# Patient Record
Sex: FEMALE | Race: WHITE | NOT HISPANIC OR LATINO | Employment: FULL TIME | URBAN - METROPOLITAN AREA
[De-identification: names, ages, dates, MRNs, and addresses within clinical notes are randomized per-mention and may not be internally consistent; named-entity substitution may affect disease eponyms.]

---

## 2018-05-05 ENCOUNTER — APPOINTMENT (EMERGENCY)
Dept: RADIOLOGY | Facility: HOSPITAL | Age: 58
End: 2018-05-05
Attending: EMERGENCY MEDICINE
Payer: OTHER MISCELLANEOUS

## 2018-05-05 ENCOUNTER — HOSPITAL ENCOUNTER (INPATIENT)
Facility: HOSPITAL | Age: 58
LOS: 2 days | Discharge: HOME HEALTH | End: 2018-05-08
Attending: EMERGENCY MEDICINE | Admitting: HOSPITALIST
Payer: OTHER MISCELLANEOUS

## 2018-05-05 DIAGNOSIS — S72.001A CLOSED FRACTURE OF NECK OF RIGHT FEMUR, INITIAL ENCOUNTER (CMS/HCC): ICD-10-CM
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
## 2018-05-05 DIAGNOSIS — S62.101A CLOSED FRACTURE OF RIGHT WRIST, INITIAL ENCOUNTER: ICD-10-CM

## 2018-05-05 DIAGNOSIS — S52.501A CLOSED FRACTURE OF DISTAL END OF RIGHT RADIUS, UNSPECIFIED FRACTURE MORPHOLOGY, INITIAL ENCOUNTER: ICD-10-CM

## 2018-05-05 LAB
ALBUMIN SERPL-MCNC: 4.5 G/DL (ref 3.4–5)
ALP SERPL-CCNC: 70 IU/L (ref 35–126)
ALT SERPL-CCNC: 17 IU/L (ref 11–54)
ANION GAP SERPL CALC-SCNC: 7 MEQ/L (ref 3–15)
AST SERPL-CCNC: 26 IU/L (ref 15–41)
BASOPHILS # BLD: 0.05 K/UL (ref 0.01–0.1)
BASOPHILS NFR BLD: 0.6 %
BILIRUB SERPL-MCNC: 0.3 MG/DL (ref 0.3–1.2)
BUN SERPL-MCNC: 14 MG/DL (ref 8–20)
CALCIUM SERPL-MCNC: 9.7 MG/DL (ref 8.9–10.3)
CHLORIDE SERPL-SCNC: 103 MMOL/L (ref 98–109)
CO2 SERPL-SCNC: 30 MMOL/L (ref 22–32)
CREAT SERPL-MCNC: 0.6 MG/DL (ref 0.6–1.1)
DIFFERENTIAL METHOD BLD: ABNORMAL
EOSINOPHIL # BLD: 0.14 K/UL (ref 0.04–0.36)
EOSINOPHIL NFR BLD: 1.6 %
ERYTHROCYTE [DISTWIDTH] IN BLOOD BY AUTOMATED COUNT: 13.3 % (ref 11.7–14.4)
GFR SERPL CREATININE-BSD FRML MDRD: >60 ML/MIN/1.73M*2
GLUCOSE SERPL-MCNC: 114 MG/DL (ref 70–99)
HCT VFR BLDCO AUTO: 38.9 % (ref 35–45)
HGB BLD-MCNC: 12.8 G/DL (ref 11.8–15.7)
IMM GRANULOCYTES # BLD AUTO: 0.09 K/UL (ref 0–0.08)
IMM GRANULOCYTES NFR BLD AUTO: 1.1 %
LYMPHOCYTES # BLD: 3.23 K/UL (ref 1.2–3.5)
LYMPHOCYTES NFR BLD: 38 %
MCH RBC QN AUTO: 30.3 PG (ref 28–33.2)
MCHC RBC AUTO-ENTMCNC: 32.9 G/DL (ref 32.2–35.5)
MCV RBC AUTO: 92 FL (ref 83–98)
MONOCYTES # BLD: 0.75 K/UL (ref 0.28–0.8)
MONOCYTES NFR BLD: 8.8 %
NEUTROPHILS # BLD: 4.24 K/UL (ref 1.7–7)
NEUTS SEG NFR BLD: 49.9 %
NRBC BLD-RTO: 0 %
PDW BLD AUTO: 11.5 FL (ref 9.4–12.3)
PLATELET # BLD AUTO: 198 K/UL (ref 150–369)
POTASSIUM SERPL-SCNC: 3.2 MMOL/L (ref 3.6–5.1)
PROT SERPL-MCNC: 7.6 G/DL (ref 6–8.2)
RBC # BLD AUTO: 4.23 M/UL (ref 3.93–5.22)
SODIUM SERPL-SCNC: 140 MMOL/L (ref 136–144)
WBC # BLD AUTO: 8.5 K/UL (ref 3.8–10.5)

## 2018-05-05 PROCEDURE — 85025 COMPLETE CBC W/AUTO DIFF WBC: CPT | Performed by: EMERGENCY MEDICINE

## 2018-05-05 PROCEDURE — 80053 COMPREHEN METABOLIC PANEL: CPT | Performed by: EMERGENCY MEDICINE

## 2018-05-05 PROCEDURE — 99284 EMERGENCY DEPT VISIT MOD MDM: CPT | Mod: 25

## 2018-05-05 PROCEDURE — 73502 X-RAY EXAM HIP UNI 2-3 VIEWS: CPT | Mod: RT

## 2018-05-05 PROCEDURE — 36415 COLL VENOUS BLD VENIPUNCTURE: CPT

## 2018-05-05 PROCEDURE — 73110 X-RAY EXAM OF WRIST: CPT | Mod: RT

## 2018-05-05 PROCEDURE — 96374 THER/PROPH/DIAG INJ IV PUSH: CPT

## 2018-05-05 RX ORDER — SERTRALINE HYDROCHLORIDE 100 MG/1
100 TABLET, FILM COATED ORAL DAILY
COMMUNITY
End: 2018-06-25

## 2018-05-05 RX ORDER — SODIUM CHLORIDE 9 MG/ML
126 INJECTION, SOLUTION INTRAVENOUS CONTINUOUS
Status: DISCONTINUED | OUTPATIENT
Start: 2018-05-05 | End: 2018-05-06

## 2018-05-05 RX ORDER — MORPHINE SULFATE 4 MG/ML
4 INJECTION, SOLUTION INTRAMUSCULAR; INTRAVENOUS EVERY 30 MIN PRN
Status: COMPLETED | OUTPATIENT
Start: 2018-05-05 | End: 2018-05-06

## 2018-05-06 ENCOUNTER — APPOINTMENT (INPATIENT)
Dept: RADIOLOGY | Facility: HOSPITAL | Age: 58
End: 2018-05-06
Attending: SPECIALIST
Payer: OTHER MISCELLANEOUS

## 2018-05-06 ENCOUNTER — ANESTHESIA EVENT (INPATIENT)
Dept: OPERATING ROOM | Facility: HOSPITAL | Age: 58
End: 2018-05-06
Payer: OTHER MISCELLANEOUS

## 2018-05-06 ENCOUNTER — APPOINTMENT (OUTPATIENT)
Dept: RADIOLOGY | Facility: HOSPITAL | Age: 58
Setting detail: OBSERVATION
End: 2018-05-06
Attending: SPECIALIST
Payer: OTHER MISCELLANEOUS

## 2018-05-06 ENCOUNTER — ANESTHESIA (INPATIENT)
Dept: OPERATING ROOM | Facility: HOSPITAL | Age: 58
End: 2018-05-06
Payer: OTHER MISCELLANEOUS

## 2018-05-06 ENCOUNTER — APPOINTMENT (OUTPATIENT)
Dept: RADIOLOGY | Facility: HOSPITAL | Age: 58
Setting detail: OBSERVATION
End: 2018-05-06
Attending: HOSPITALIST
Payer: OTHER MISCELLANEOUS
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
## 2018-05-06 PROBLEM — S72.001A CLOSED FRACTURE OF NECK OF RIGHT FEMUR (CMS/HCC): Status: ACTIVE | Noted: 2018-05-05

## 2018-05-06 PROBLEM — S62.101A CLOSED FRACTURE OF RIGHT WRIST: Status: ACTIVE | Noted: 2018-05-06
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
## 2018-05-06 PROBLEM — F32.A DEPRESSION: Status: ACTIVE | Noted: 2018-05-06

## 2018-05-06 PROBLEM — S72.001D CLOSED FRACTURE OF RIGHT HIP WITH ROUTINE HEALING: Status: ACTIVE | Noted: 2018-05-06

## 2018-05-06 LAB
AMPHET UR QL SCN: ABNORMAL
ANION GAP SERPL CALC-SCNC: 6 MEQ/L (ref 3–15)
BACTERIA URNS QL MICRO: ABNORMAL /UL
BARBITURATES UR QL SCN: ABNORMAL
BASOPHILS # BLD: 0.03 K/UL (ref 0.01–0.1)
BASOPHILS NFR BLD: 0.3 %
BENZODIAZ UR QL SCN: ABNORMAL
BILIRUB UR QL STRIP.AUTO: NEGATIVE MG/DL
BUN SERPL-MCNC: 14 MG/DL (ref 8–20)
CALCIUM SERPL-MCNC: 9.5 MG/DL (ref 8.9–10.3)
CANNABINOIDS UR QL SCN: ABNORMAL
CHLORIDE SERPL-SCNC: 106 MMOL/L (ref 98–109)
CLARITY UR REFRACT.AUTO: ABNORMAL
CO2 SERPL-SCNC: 28 MMOL/L (ref 22–32)
COCAINE UR QL SCN: ABNORMAL
COLOR UR AUTO: YELLOW
CREAT SERPL-MCNC: 0.5 MG/DL (ref 0.6–1.1)
DIFFERENTIAL METHOD BLD: ABNORMAL
EOSINOPHIL # BLD: 0.1 K/UL (ref 0.04–0.36)
EOSINOPHIL NFR BLD: 1 %
ERYTHROCYTE [DISTWIDTH] IN BLOOD BY AUTOMATED COUNT: 13.3 % (ref 11.7–14.4)
GFR SERPL CREATININE-BSD FRML MDRD: >60 ML/MIN/1.73M*2
GLUCOSE SERPL-MCNC: 112 MG/DL (ref 70–99)
GLUCOSE UR STRIP.AUTO-MCNC: NEGATIVE MG/DL
HCT VFR BLDCO AUTO: 39.3 % (ref 35–45)
HGB BLD-MCNC: 12.7 G/DL (ref 11.8–15.7)
HGB UR QL STRIP.AUTO: NEGATIVE
HYALINE CASTS #/AREA URNS LPF: ABNORMAL /LPF
IMM GRANULOCYTES # BLD AUTO: 0.04 K/UL (ref 0–0.08)
IMM GRANULOCYTES NFR BLD AUTO: 0.4 %
KETONES UR STRIP.AUTO-MCNC: NEGATIVE MG/DL
LEUKOCYTE ESTERASE UR QL STRIP.AUTO: 2
LYMPHOCYTES # BLD: 2.02 K/UL (ref 1.2–3.5)
LYMPHOCYTES NFR BLD: 19.8 %
MAGNESIUM SERPL-MCNC: 2 MG/DL (ref 1.8–2.5)
MCH RBC QN AUTO: 30 PG (ref 28–33.2)
MCHC RBC AUTO-ENTMCNC: 32.3 G/DL (ref 32.2–35.5)
MCV RBC AUTO: 92.9 FL (ref 83–98)
MONOCYTES # BLD: 1.03 K/UL (ref 0.28–0.8)
MONOCYTES NFR BLD: 10.1 %
MUCOUS THREADS URNS QL MICRO: ABNORMAL /LPF
NEUTROPHILS # BLD: 6.96 K/UL (ref 1.7–7)
NEUTS SEG NFR BLD: 68.4 %
NITRITE UR QL STRIP.AUTO: NEGATIVE
NRBC BLD-RTO: 0 %
OPIATES UR QL SCN: POSITIVE MA/MIN
PCP UR QL SCN: ABNORMAL
PDW BLD AUTO: 11.2 FL (ref 9.4–12.3)
PH UR STRIP.AUTO: 7 [PH]
PLATELET # BLD AUTO: 178 K/UL (ref 150–369)
POTASSIUM SERPL-SCNC: 3.9 MMOL/L (ref 3.6–5.1)
PROT UR QL STRIP.AUTO: NEGATIVE
RAINBOW HOLD SPECIMEN: NORMAL
RBC # BLD AUTO: 4.23 M/UL (ref 3.93–5.22)
RBC #/AREA URNS HPF: ABNORMAL /HPF
SODIUM SERPL-SCNC: 140 MMOL/L (ref 136–144)
SP GR UR REFRACT.AUTO: 1.01
SQUAMOUS URNS QL MICRO: 1 /HPF
UROBILINOGEN UR STRIP-ACNC: 0.2 EU/DL
WBC # BLD AUTO: 10.18 K/UL (ref 3.8–10.5)
WBC #/AREA URNS HPF: ABNORMAL /HPF

## 2018-05-06 PROCEDURE — 63600000 HC DRUGS/DETAIL CODE: Performed by: SPECIALIST

## 2018-05-06 PROCEDURE — 63700000 HC SELF-ADMINISTRABLE DRUG: Performed by: HOSPITALIST

## 2018-05-06 PROCEDURE — 27200000 HC STERILE SUPPLY: Performed by: SPECIALIST

## 2018-05-06 PROCEDURE — 71000001 HC PACU PHASE 1 INITIAL 30MIN: Performed by: SPECIALIST

## 2018-05-06 PROCEDURE — 63600000 HC DRUGS/DETAIL CODE: Performed by: EMERGENCY MEDICINE

## 2018-05-06 PROCEDURE — 63600000 HC DRUGS/DETAIL CODE: Performed by: NURSE ANESTHETIST, CERTIFIED REGISTERED

## 2018-05-06 PROCEDURE — 71000011 HC PACU PHASE 1 EA ADDL MIN: Performed by: SPECIALIST

## 2018-05-06 PROCEDURE — 85025 COMPLETE CBC W/AUTO DIFF WBC: CPT | Performed by: HOSPITALIST

## 2018-05-06 PROCEDURE — 0QS634Z REPOSITION RIGHT UPPER FEMUR WITH INTERNAL FIXATION DEVICE, PERCUTANEOUS APPROACH: ICD-10-PCS | Performed by: SPECIALIST

## 2018-05-06 PROCEDURE — 2W3AX2Z IMMOBILIZATION OF RIGHT UPPER ARM USING CAST: ICD-10-PCS | Performed by: EMERGENCY MEDICINE

## 2018-05-06 PROCEDURE — 36415 COLL VENOUS BLD VENIPUNCTURE: CPT | Performed by: HOSPITALIST

## 2018-05-06 PROCEDURE — 63600000 HC DRUGS/DETAIL CODE: Performed by: HOSPITALIST

## 2018-05-06 PROCEDURE — G0378 HOSPITAL OBSERVATION PER HR: HCPCS

## 2018-05-06 PROCEDURE — 73502 X-RAY EXAM HIP UNI 2-3 VIEWS: CPT | Mod: RT

## 2018-05-06 PROCEDURE — 25800000 HC PHARMACY IV SOLUTIONS: Performed by: HOSPITALIST

## 2018-05-06 PROCEDURE — 12000000 HC ROOM AND CARE MED/SURG

## 2018-05-06 PROCEDURE — 37000010 HC ANESTHESIA SPINAL: Performed by: SPECIALIST

## 2018-05-06 PROCEDURE — 83735 ASSAY OF MAGNESIUM: CPT | Performed by: HOSPITALIST

## 2018-05-06 PROCEDURE — 96374 THER/PROPH/DIAG INJ IV PUSH: CPT

## 2018-05-06 PROCEDURE — 25800000 HC PHARMACY IV SOLUTIONS: Performed by: SPECIALIST

## 2018-05-06 PROCEDURE — 71045 X-RAY EXAM CHEST 1 VIEW: CPT

## 2018-05-06 PROCEDURE — 80048 BASIC METABOLIC PNL TOTAL CA: CPT | Performed by: HOSPITALIST

## 2018-05-06 PROCEDURE — C1713 ANCHOR/SCREW BN/BN,TIS/BN: HCPCS | Performed by: SPECIALIST

## 2018-05-06 PROCEDURE — 36000014 HC OR LEVEL 4 EA ADDL MIN: Performed by: SPECIALIST

## 2018-05-06 PROCEDURE — BQ13ZZZ FLUOROSCOPY OF RIGHT FEMUR: ICD-10-PCS | Performed by: SPECIALIST

## 2018-05-06 PROCEDURE — 99219 PR INITIAL OBSERVATION CARE/DAY 50 MINUTES: CPT | Performed by: HOSPITALIST

## 2018-05-06 PROCEDURE — 96376 TX/PRO/DX INJ SAME DRUG ADON: CPT

## 2018-05-06 PROCEDURE — 81001 URINALYSIS AUTO W/SCOPE: CPT | Performed by: HOSPITALIST

## 2018-05-06 PROCEDURE — 99233 SBSQ HOSP IP/OBS HIGH 50: CPT | Performed by: HOSPITALIST

## 2018-05-06 PROCEDURE — 20600000 HC ROOM AND CARE INTERMEDIATE/TELEMETRY

## 2018-05-06 PROCEDURE — 36000004 HC OR LEVEL 4 INITIAL 30MIN: Performed by: SPECIALIST

## 2018-05-06 PROCEDURE — 80307 DRUG TEST PRSMV CHEM ANLYZR: CPT | Performed by: HOSPITALIST

## 2018-05-06 PROCEDURE — 37000010 ANESTHESIA SPINAL BLOCK: Performed by: ANESTHESIOLOGY

## 2018-05-06 PROCEDURE — 2W3AX1Z IMMOBILIZATION OF RIGHT UPPER ARM USING SPLINT: ICD-10-PCS | Performed by: EMERGENCY MEDICINE

## 2018-05-06 RX ORDER — ONDANSETRON 4 MG/1
4 TABLET, ORALLY DISINTEGRATING ORAL EVERY 8 HOURS PRN
Status: DISCONTINUED | OUTPATIENT
Start: 2018-05-06 | End: 2018-05-08 | Stop reason: HOSPADM

## 2018-05-06 RX ORDER — SERTRALINE HYDROCHLORIDE 100 MG/1
100 TABLET, FILM COATED ORAL DAILY
Status: DISCONTINUED | OUTPATIENT
Start: 2018-05-06 | End: 2018-05-08 | Stop reason: HOSPADM

## 2018-05-06 RX ORDER — ENOXAPARIN SODIUM 100 MG/ML
40 INJECTION SUBCUTANEOUS
Status: DISCONTINUED | OUTPATIENT
Start: 2018-05-06 | End: 2018-05-08 | Stop reason: HOSPADM

## 2018-05-06 RX ORDER — SODIUM CHLORIDE 9 MG/ML
INJECTION, SOLUTION INTRAVENOUS CONTINUOUS
Status: DISCONTINUED | OUTPATIENT
Start: 2018-05-06 | End: 2018-05-06

## 2018-05-06 RX ORDER — DEXTROSE 50 % IN WATER (D50W) INTRAVENOUS SYRINGE
25 AS NEEDED
Status: DISCONTINUED | OUTPATIENT
Start: 2018-05-06 | End: 2018-05-06 | Stop reason: SDUPTHER

## 2018-05-06 RX ORDER — CEFAZOLIN SODIUM/WATER 1 G/10 ML
SYRINGE (ML) INTRAVENOUS AS NEEDED
Status: DISCONTINUED | OUTPATIENT
Start: 2018-05-06 | End: 2018-05-06 | Stop reason: SURG

## 2018-05-06 RX ORDER — OXYCODONE HYDROCHLORIDE 5 MG/1
5 TABLET ORAL EVERY 4 HOURS PRN
Status: DISCONTINUED | OUTPATIENT
Start: 2018-05-06 | End: 2018-05-08 | Stop reason: HOSPADM

## 2018-05-06 RX ORDER — HYDROMORPHONE HYDROCHLORIDE 1 MG/ML
0.5 INJECTION, SOLUTION INTRAMUSCULAR; INTRAVENOUS; SUBCUTANEOUS
Status: DISCONTINUED | OUTPATIENT
Start: 2018-05-06 | End: 2018-05-06 | Stop reason: HOSPADM

## 2018-05-06 RX ORDER — ACETAMINOPHEN 325 MG/1
650 TABLET ORAL EVERY 4 HOURS PRN
Status: DISCONTINUED | OUTPATIENT
Start: 2018-05-06 | End: 2018-05-08 | Stop reason: HOSPADM

## 2018-05-06 RX ORDER — SODIUM CHLORIDE 9 MG/ML
INJECTION, SOLUTION INTRAVENOUS ONCE
Status: COMPLETED | OUTPATIENT
Start: 2018-05-06 | End: 2018-05-06

## 2018-05-06 RX ORDER — ACETAMINOPHEN 325 MG/1
650 TABLET ORAL EVERY 4 HOURS PRN
Status: DISCONTINUED | OUTPATIENT
Start: 2018-05-06 | End: 2018-05-06 | Stop reason: SDUPTHER

## 2018-05-06 RX ORDER — PROPOFOL 10 MG/ML
INJECTION, EMULSION INTRAVENOUS CONTINUOUS PRN
Status: DISCONTINUED | OUTPATIENT
Start: 2018-05-06 | End: 2018-05-06 | Stop reason: SURG

## 2018-05-06 RX ORDER — ONDANSETRON HYDROCHLORIDE 2 MG/ML
4 INJECTION, SOLUTION INTRAVENOUS EVERY 8 HOURS PRN
Status: DISCONTINUED | OUTPATIENT
Start: 2018-05-06 | End: 2018-05-08 | Stop reason: HOSPADM

## 2018-05-06 RX ORDER — KETOROLAC TROMETHAMINE 30 MG/ML
30 INJECTION, SOLUTION INTRAMUSCULAR; INTRAVENOUS EVERY 6 HOURS PRN
Status: DISCONTINUED | OUTPATIENT
Start: 2018-05-06 | End: 2018-05-08 | Stop reason: HOSPADM

## 2018-05-06 RX ORDER — IBUPROFEN 200 MG
16-32 TABLET ORAL AS NEEDED
Status: DISCONTINUED | OUTPATIENT
Start: 2018-05-06 | End: 2018-05-06 | Stop reason: SDUPTHER

## 2018-05-06 RX ORDER — POLYETHYLENE GLYCOL 3350 17 G/17G
17 POWDER, FOR SOLUTION ORAL DAILY
Status: DISCONTINUED | OUTPATIENT
Start: 2018-05-06 | End: 2018-05-08 | Stop reason: HOSPADM

## 2018-05-06 RX ORDER — CEFAZOLIN SODIUM 1 G/50ML
1 SOLUTION INTRAVENOUS
Status: DISCONTINUED | OUTPATIENT
Start: 2018-05-06 | End: 2018-05-08

## 2018-05-06 RX ORDER — MIDAZOLAM HYDROCHLORIDE 2 MG/2ML
INJECTION, SOLUTION INTRAMUSCULAR; INTRAVENOUS AS NEEDED
Status: DISCONTINUED | OUTPATIENT
Start: 2018-05-06 | End: 2018-05-06 | Stop reason: SURG

## 2018-05-06 RX ORDER — MORPHINE SULFATE 4 MG/ML
4 INJECTION, SOLUTION INTRAMUSCULAR; INTRAVENOUS
Status: DISCONTINUED | OUTPATIENT
Start: 2018-05-06 | End: 2018-05-06

## 2018-05-06 RX ORDER — POLYETHYLENE GLYCOL 3350 17 G/17G
17 POWDER, FOR SOLUTION ORAL DAILY
Status: DISCONTINUED | OUTPATIENT
Start: 2018-05-06 | End: 2018-05-06 | Stop reason: SDUPTHER

## 2018-05-06 RX ORDER — ONDANSETRON HYDROCHLORIDE 2 MG/ML
4 INJECTION, SOLUTION INTRAVENOUS
Status: DISCONTINUED | OUTPATIENT
Start: 2018-05-06 | End: 2018-05-06 | Stop reason: HOSPADM

## 2018-05-06 RX ORDER — DEXTROSE 40 %
15-30 GEL (GRAM) ORAL AS NEEDED
Status: DISCONTINUED | OUTPATIENT
Start: 2018-05-06 | End: 2018-05-06 | Stop reason: SDUPTHER

## 2018-05-06 RX ORDER — CEFAZOLIN SODIUM/WATER 2 G/20 ML
2 SYRINGE (ML) INTRAVENOUS ONCE
Status: CANCELLED | OUTPATIENT
Start: 2018-05-06 | End: 2018-05-06

## 2018-05-06 RX ORDER — IBUPROFEN 200 MG
16-32 TABLET ORAL AS NEEDED
Status: DISCONTINUED | OUTPATIENT
Start: 2018-05-06 | End: 2018-05-08 | Stop reason: HOSPADM

## 2018-05-06 RX ORDER — MORPHINE SULFATE 4 MG/ML
1-2 INJECTION, SOLUTION INTRAMUSCULAR; INTRAVENOUS
Status: DISCONTINUED | OUTPATIENT
Start: 2018-05-06 | End: 2018-05-06 | Stop reason: SDUPTHER

## 2018-05-06 RX ORDER — DEXTROSE 40 %
15-30 GEL (GRAM) ORAL AS NEEDED
Status: DISCONTINUED | OUTPATIENT
Start: 2018-05-06 | End: 2018-05-08 | Stop reason: HOSPADM

## 2018-05-06 RX ORDER — ONDANSETRON 4 MG/1
4 TABLET, ORALLY DISINTEGRATING ORAL EVERY 8 HOURS PRN
Status: DISCONTINUED | OUTPATIENT
Start: 2018-05-06 | End: 2018-05-06 | Stop reason: SDUPTHER

## 2018-05-06 RX ORDER — SODIUM CHLORIDE 9 MG/ML
INJECTION, SOLUTION INTRAVENOUS ONCE
Status: ACTIVE | OUTPATIENT
Start: 2018-05-06 | End: 2018-05-06

## 2018-05-06 RX ORDER — ONDANSETRON HYDROCHLORIDE 2 MG/ML
4 INJECTION, SOLUTION INTRAVENOUS EVERY 8 HOURS PRN
Status: DISCONTINUED | OUTPATIENT
Start: 2018-05-06 | End: 2018-05-06 | Stop reason: SDUPTHER

## 2018-05-06 RX ORDER — DEXTROSE 50 % IN WATER (D50W) INTRAVENOUS SYRINGE
25 AS NEEDED
Status: DISCONTINUED | OUTPATIENT
Start: 2018-05-06 | End: 2018-05-08 | Stop reason: HOSPADM

## 2018-05-06 RX ORDER — FENTANYL CITRATE 50 UG/ML
50 INJECTION, SOLUTION INTRAMUSCULAR; INTRAVENOUS
Status: DISCONTINUED | OUTPATIENT
Start: 2018-05-06 | End: 2018-05-06 | Stop reason: HOSPADM

## 2018-05-06 RX ORDER — MORPHINE SULFATE 4 MG/ML
2 INJECTION, SOLUTION INTRAMUSCULAR; INTRAVENOUS
Status: DISCONTINUED | OUTPATIENT
Start: 2018-05-06 | End: 2018-05-08 | Stop reason: HOSPADM

## 2018-05-06 RX ORDER — SODIUM CHLORIDE 9 MG/ML
INJECTION, SOLUTION INTRAVENOUS CONTINUOUS
Status: DISCONTINUED | OUTPATIENT
Start: 2018-05-06 | End: 2018-05-06 | Stop reason: SDUPTHER

## 2018-05-06 RX ORDER — FENTANYL CITRATE 50 UG/ML
INJECTION, SOLUTION INTRAMUSCULAR; INTRAVENOUS AS NEEDED
Status: DISCONTINUED | OUTPATIENT
Start: 2018-05-06 | End: 2018-05-06 | Stop reason: SURG

## 2018-05-06 RX ADMIN — MORPHINE SULFATE 4 MG: 4 INJECTION INTRAVENOUS at 00:03

## 2018-05-06 RX ADMIN — CEFAZOLIN SODIUM 1 G: 1 SOLUTION INTRAVENOUS at 20:46

## 2018-05-06 RX ADMIN — Medication 2 G: at 12:33

## 2018-05-06 RX ADMIN — MORPHINE SULFATE 4 MG: 4 INJECTION INTRAVENOUS at 07:43

## 2018-05-06 RX ADMIN — SERTRALINE HYDROCHLORIDE 100 MG: 100 TABLET ORAL at 10:53

## 2018-05-06 RX ADMIN — PROPOFOL 60 MCG/KG/MIN: 10 INJECTION, EMULSION INTRAVENOUS at 12:16

## 2018-05-06 RX ADMIN — KETOROLAC TROMETHAMINE 30 MG: 30 INJECTION, SOLUTION INTRAMUSCULAR at 20:46

## 2018-05-06 RX ADMIN — SODIUM CHLORIDE: 9 INJECTION, SOLUTION INTRAVENOUS at 04:23

## 2018-05-06 RX ADMIN — MIDAZOLAM HYDROCHLORIDE 2 MG: 1 INJECTION, SOLUTION INTRAMUSCULAR; INTRAVENOUS at 12:16

## 2018-05-06 RX ADMIN — SODIUM CHLORIDE: 9 INJECTION, SOLUTION INTRAVENOUS at 16:50

## 2018-05-06 RX ADMIN — MORPHINE SULFATE 2 MG: 4 INJECTION INTRAVENOUS at 17:11

## 2018-05-06 RX ADMIN — FENTANYL CITRATE 100 MCG: 50 INJECTION INTRAMUSCULAR; INTRAVENOUS at 12:16

## 2018-05-06 RX ADMIN — MORPHINE SULFATE 4 MG: 4 INJECTION INTRAVENOUS at 01:39

## 2018-05-06 RX ADMIN — MORPHINE SULFATE 2 MG: 4 INJECTION INTRAVENOUS at 19:01

## 2018-05-06 RX ADMIN — ENOXAPARIN SODIUM 40 MG: 100 INJECTION SUBCUTANEOUS at 19:01

## 2018-05-06 ASSESSMENT — PAIN SCALES - GENERAL: PAIN_LEVEL: 0

## 2018-05-06 NOTE — ANESTHESIA POSTPROCEDURE EVALUATION
Patient: Giovanna Noble    Procedure Summary     Date:  05/06/18 Room / Location:   OR 6 / PH OR    Anesthesia Start:  1216 Anesthesia Stop:  1335    Procedure:  OPEN REDUCTION INTERNAL FIXATION HIP/FEMUR PINNING/CANNULATED SCREWS (Right Hip) Diagnosis:       Closed fracture of neck of right femur, initial encounter (CMS/[facility]) ([facility])      (Closed fracture of neck of right femur, initial encounter (CMS/[facility]) ([facility]) [S72.001A])    Surgeon:  Antonio Mckeon Jr., MD Responsible Provider:  Rose Eason MD    Anesthesia Type:  spinal ASA Status:  2          Anesthesia Type: spinal  PACU Vitals  5/6/2018 1330 - 5/6/2018 1353      5/6/2018 1335 5/6/2018 1336          BP: - 130/77      Temp: 36.9 °C (98.5 °F) -      Resp: - 20      SpO2: - 100 %              Anesthesia Post Evaluation    Pain score: 0  Pain management: adequate  Patient participation: complete - patient participated  Level of consciousness: awake and alert  Cardiovascular status: acceptable  Airway Patency: adequate  Respiratory status: acceptable  Hydration status: acceptable  Anesthetic complications: no  Comments: Spinal block resolving well.

## 2018-05-06 NOTE — ANESTHESIA PREPROCEDURE EVALUATION
Anesthesia ROS/MED HX    Anesthesia History - neg  Pulmonary - neg  Neuro/Psych - neg  Cardiovascular- neg  GI/Hepatic- neg  Endo/Other   Body Habitus: Normal  ROS/MED HX Comments:    Musculoskeletal: Right hip and Right wrist fracture. S/p fall.      Past Surgical History:   Procedure Laterality Date   •  SECTION         Physical Exam    Airway   Mallampati: II   TM distance: >3 FB   Neck ROM: full  Cardiovascular - normal   Rhythm: regular   Rate: normal  Pulmonary - normal   clear to auscultation  Dental - normal        Anesthesia Plan    Plan: spinal     Airway: natural airway / supplemental oxygen   ASA 2  Postop Plan:   Patient Disposition: inpatient floor planned admission   Pain Management: IV analgesics

## 2018-05-06 NOTE — ANESTHESIA PROCEDURE NOTES
Spinal Block    Patient location during procedure: OB  Start time: 5/6/2018 12:25 PM  End time: 5/6/2018 12:30 PM  Reason for block: at surgeon's request  Staffing  Anesthesiologist: MELI DENNIS  Performed: anesthesiologist   Preanesthetic Checklist  Completed: patient identified, surgical consent, pre-op evaluation, timeout performed, IV checked, risks and benefits discussed and monitors and equipment checked  Spinal Block  Patient position: right lateral decubitus  Prep: Betadine and site prepped and draped  Patient monitoring: heart rate, cardiac monitor, continuous pulse ox and blood pressure  Approach: midline  Location: L4-5  Injection technique: single-shot  Needle  Needle type: pencil-tip   Needle gauge: 25 G  Needle length: 3.5 in  Assessment  Sensory level: T4  Events: cerebrospinal fluid  Additional Notes  Procedure well tolerated. Vital signs stable.

## 2018-05-07 ENCOUNTER — APPOINTMENT (INPATIENT)
Dept: OCCUPATIONAL THERAPY | Facility: HOSPITAL | Age: 58
End: 2018-05-07
Attending: SPECIALIST
Payer: OTHER MISCELLANEOUS

## 2018-05-07 ENCOUNTER — APPOINTMENT (INPATIENT)
Dept: PHYSICAL THERAPY | Facility: HOSPITAL | Age: 58
End: 2018-05-07
Attending: SPECIALIST
Payer: OTHER MISCELLANEOUS

## 2018-05-07 LAB
ANION GAP SERPL CALC-SCNC: 7 MEQ/L (ref 3–15)
BASOPHILS # BLD: 0.02 K/UL (ref 0.01–0.1)
BASOPHILS NFR BLD: 0.2 %
BUN SERPL-MCNC: 8 MG/DL (ref 8–20)
CALCIUM SERPL-MCNC: 9.2 MG/DL (ref 8.9–10.3)
CHLORIDE SERPL-SCNC: 108 MMOL/L (ref 98–109)
CO2 SERPL-SCNC: 24 MMOL/L (ref 22–32)
CREAT SERPL-MCNC: 0.4 MG/DL (ref 0.6–1.1)
DIFFERENTIAL METHOD BLD: NORMAL
EOSINOPHIL # BLD: 0.15 K/UL (ref 0.04–0.36)
EOSINOPHIL NFR BLD: 1.8 %
ERYTHROCYTE [DISTWIDTH] IN BLOOD BY AUTOMATED COUNT: 13.2 % (ref 11.7–14.4)
GFR SERPL CREATININE-BSD FRML MDRD: >60 ML/MIN/1.73M*2
GLUCOSE SERPL-MCNC: 122 MG/DL (ref 70–99)
HCT VFR BLDCO AUTO: 37.8 % (ref 35–45)
HGB BLD-MCNC: 12.3 G/DL (ref 11.8–15.7)
IMM GRANULOCYTES # BLD AUTO: 0.03 K/UL (ref 0–0.08)
IMM GRANULOCYTES NFR BLD AUTO: 0.4 %
LYMPHOCYTES # BLD: 1.35 K/UL (ref 1.2–3.5)
LYMPHOCYTES NFR BLD: 16.3 %
MCH RBC QN AUTO: 29.9 PG (ref 28–33.2)
MCHC RBC AUTO-ENTMCNC: 32.5 G/DL (ref 32.2–35.5)
MCV RBC AUTO: 92 FL (ref 83–98)
MONOCYTES # BLD: 0.72 K/UL (ref 0.28–0.8)
MONOCYTES NFR BLD: 8.7 %
NEUTROPHILS # BLD: 6.01 K/UL (ref 1.7–7)
NEUTS SEG NFR BLD: 72.6 %
NRBC BLD-RTO: 0 %
PDW BLD AUTO: 11 FL (ref 9.4–12.3)
PLATELET # BLD AUTO: 177 K/UL (ref 150–369)
POTASSIUM SERPL-SCNC: 3.8 MMOL/L (ref 3.6–5.1)
RBC # BLD AUTO: 4.11 M/UL (ref 3.93–5.22)
SODIUM SERPL-SCNC: 139 MMOL/L (ref 136–144)
WBC # BLD AUTO: 8.28 K/UL (ref 3.8–10.5)

## 2018-05-07 PROCEDURE — 97166 OT EVAL MOD COMPLEX 45 MIN: CPT | Mod: GO

## 2018-05-07 PROCEDURE — 36415 COLL VENOUS BLD VENIPUNCTURE: CPT | Performed by: HOSPITALIST

## 2018-05-07 PROCEDURE — 12000000 HC ROOM AND CARE MED/SURG

## 2018-05-07 PROCEDURE — 97162 PT EVAL MOD COMPLEX 30 MIN: CPT | Mod: GP

## 2018-05-07 PROCEDURE — 63700000 HC SELF-ADMINISTRABLE DRUG: Performed by: HOSPITALIST

## 2018-05-07 PROCEDURE — 63700000 HC SELF-ADMINISTRABLE DRUG: Performed by: SPECIALIST

## 2018-05-07 PROCEDURE — 80048 BASIC METABOLIC PNL TOTAL CA: CPT | Performed by: HOSPITALIST

## 2018-05-07 PROCEDURE — 63600000 HC DRUGS/DETAIL CODE: Performed by: HOSPITALIST

## 2018-05-07 PROCEDURE — 99232 SBSQ HOSP IP/OBS MODERATE 35: CPT | Performed by: FAMILY MEDICINE

## 2018-05-07 PROCEDURE — 63600000 HC DRUGS/DETAIL CODE: Performed by: SPECIALIST

## 2018-05-07 PROCEDURE — 85025 COMPLETE CBC W/AUTO DIFF WBC: CPT | Performed by: HOSPITALIST

## 2018-05-07 RX ADMIN — KETOROLAC TROMETHAMINE 30 MG: 30 INJECTION, SOLUTION INTRAMUSCULAR at 08:44

## 2018-05-07 RX ADMIN — SERTRALINE HYDROCHLORIDE 100 MG: 100 TABLET ORAL at 08:43

## 2018-05-07 RX ADMIN — CEFAZOLIN SODIUM 1 G: 1 SOLUTION INTRAVENOUS at 20:59

## 2018-05-07 RX ADMIN — POLYETHYLENE GLYCOL 3350 17 G: 17 POWDER, FOR SOLUTION ORAL at 08:43

## 2018-05-07 RX ADMIN — CEFAZOLIN SODIUM 1 G: 1 SOLUTION INTRAVENOUS at 03:28

## 2018-05-07 RX ADMIN — ACETAMINOPHEN 650 MG: 325 TABLET, FILM COATED ORAL at 08:44

## 2018-05-07 RX ADMIN — CEFAZOLIN SODIUM 1 G: 1 SOLUTION INTRAVENOUS at 12:38

## 2018-05-07 RX ADMIN — ENOXAPARIN SODIUM 40 MG: 100 INJECTION SUBCUTANEOUS at 19:01

## 2018-05-07 RX ADMIN — OXYCODONE HYDROCHLORIDE 5 MG: 5 TABLET ORAL at 08:43

## 2018-05-08 VITALS
TEMPERATURE: 97.9 F | WEIGHT: 127 LBS | HEART RATE: 69 BPM | BODY MASS INDEX: 19.93 KG/M2 | HEIGHT: 67 IN | DIASTOLIC BLOOD PRESSURE: 50 MMHG | SYSTOLIC BLOOD PRESSURE: 108 MMHG | RESPIRATION RATE: 16 BRPM | OXYGEN SATURATION: 98 %

## 2018-05-08 PROCEDURE — 63600000 HC DRUGS/DETAIL CODE: Performed by: SPECIALIST

## 2018-05-08 PROCEDURE — 63700000 HC SELF-ADMINISTRABLE DRUG: Performed by: HOSPITALIST

## 2018-05-08 PROCEDURE — 99239 HOSP IP/OBS DSCHRG MGMT >30: CPT | Performed by: FAMILY MEDICINE

## 2018-05-08 PROCEDURE — 63700000 HC SELF-ADMINISTRABLE DRUG: Performed by: SPECIALIST

## 2018-05-08 RX ORDER — OXYCODONE HYDROCHLORIDE 5 MG/1
5 TABLET ORAL 2 TIMES DAILY PRN
Qty: 6 TABLET | Refills: 0 | Status: SHIPPED | OUTPATIENT
Start: 2018-05-08 | End: 2018-06-25

## 2018-05-08 RX ORDER — ASPIRIN 81 MG/1
81 TABLET ORAL 2 TIMES DAILY
Qty: 60 TABLET | Refills: 0 | COMMUNITY
Start: 2018-05-08 | End: 2019-07-01

## 2018-05-08 RX ADMIN — POLYETHYLENE GLYCOL 3350 17 G: 17 POWDER, FOR SOLUTION ORAL at 09:09

## 2018-05-08 RX ADMIN — OXYCODONE HYDROCHLORIDE 5 MG: 5 TABLET ORAL at 17:56

## 2018-05-08 RX ADMIN — SERTRALINE HYDROCHLORIDE 100 MG: 100 TABLET ORAL at 09:09

## 2018-05-08 RX ADMIN — KETOROLAC TROMETHAMINE 30 MG: 30 INJECTION, SOLUTION INTRAMUSCULAR at 02:33

## 2018-05-08 RX ADMIN — OXYCODONE HYDROCHLORIDE 5 MG: 5 TABLET ORAL at 02:34

## 2018-05-08 RX ADMIN — KETOROLAC TROMETHAMINE 30 MG: 30 INJECTION, SOLUTION INTRAMUSCULAR at 09:09

## 2018-05-08 RX ADMIN — CEFAZOLIN SODIUM 1 G: 1 SOLUTION INTRAVENOUS at 04:11

## 2018-05-08 RX ADMIN — OXYCODONE HYDROCHLORIDE 5 MG: 5 TABLET ORAL at 09:09

## 2018-05-09 ENCOUNTER — PATIENT OUTREACH (OUTPATIENT)
Dept: CASE MANAGEMENT | Facility: CLINIC | Age: 58
End: 2018-05-09

## 2018-05-09 NOTE — PROGRESS NOTES
NAME: Giovanna Noble    MRN: [id number]    YOB: 1960    EVENT DETAILS    Discharging Facility: [facility]  Date of Admission: 05/05/18  Date of Discharge: 05/08/18  Discharge Instructions Reviewed?: Yes         Reason for Admission:  Fall, Fracture of Right Femur, Distal Ulnar Fracture Right Wrist S/P ORIF Right Hip      HPI: Spoke with patient. She stated that she is doing well post operatively. Pain medication seems to be effective in pain management. Prescriptions were picked up today.    Patient denies issues with her incision. Patient has been ambulating with her platform walker.       Patient stated that her right wrist is in a cast. She is not reporting any issues with the cast at this time.     Home care services are expected to contact patient.      MEDICATION REVIEW:    Reported by:: Patient  Prescriptions Filled?: Yes     Was a medication discrepancy indentified?: No     Medication understanding?: Yes     Medication Adherence?: Yes     Any side effects from medication?: No       Medication review Reviewed, see medication history    Additional Comments: N/A      FOLLOW-UP TESTS/PROCEDURES: N/A    INTERVENTIONS / COORDINATION:     PCP Appt: 5/18/2018    Specialist Appt.: Orthopedic Surgeon : Will f/up in 2 weeks, pending return call from office    Home Care: [facility]   Services: RN - PT- OT    BARRIERS TO CARE    Self-Care   Living Arrangement: Spouse or Significant Other       Socioeconomic  Financial Problems?: No     Transportation Issues?: No            PLAN OF CARE:    Reviewed signs/symptoms of worsening condition or complication that necessitate a call to the Physician's office.  Educated patient on access to care.  RN phone number given for future care management needs.       Berkley Delgado RN

## 2018-06-22 RX ORDER — SERTRALINE HYDROCHLORIDE 25 MG/1
25 TABLET, FILM COATED ORAL
Refills: 0 | COMMUNITY
Start: 2018-04-11 | End: 2018-06-25

## 2018-06-25 ENCOUNTER — OFFICE VISIT (OUTPATIENT)
Dept: INTERNAL MEDICINE | Facility: CLINIC | Age: 58
End: 2018-06-25
Payer: COMMERCIAL

## 2018-06-25 VITALS
OXYGEN SATURATION: 97 % | HEIGHT: 67 IN | HEART RATE: 63 BPM | SYSTOLIC BLOOD PRESSURE: 100 MMHG | DIASTOLIC BLOOD PRESSURE: 70 MMHG | WEIGHT: 134.4 LBS | TEMPERATURE: 97.9 F | BODY MASS INDEX: 21.09 KG/M2

## 2018-06-25 DIAGNOSIS — Z13.820 SCREENING FOR OSTEOPOROSIS: ICD-10-CM

## 2018-06-25 DIAGNOSIS — Z12.4 SCREENING FOR CERVICAL CANCER: ICD-10-CM

## 2018-06-25 DIAGNOSIS — M54.31 SCIATICA OF RIGHT SIDE: ICD-10-CM

## 2018-06-25 DIAGNOSIS — Z01.419 WELL FEMALE EXAM WITH ROUTINE GYNECOLOGICAL EXAM: Primary | ICD-10-CM

## 2018-06-25 DIAGNOSIS — Z12.39 SCREENING FOR BREAST CANCER: ICD-10-CM

## 2018-06-25 PROCEDURE — 99396 PREV VISIT EST AGE 40-64: CPT | Performed by: FAMILY MEDICINE

## 2018-06-25 RX ORDER — DICLOFENAC SODIUM 75 MG/1
75 TABLET, DELAYED RELEASE ORAL 2 TIMES DAILY
COMMUNITY
End: 2018-06-25

## 2018-06-25 NOTE — PROGRESS NOTES
Subjective      Patient ID: Giovanna Noble is a 57 y.o. female.    HPI  Since last visit patient has fallen and sustained a fracture to her hip and wrist.    Had surgery.   Hx of HPV on pap last year, will need repeat today.   Concerns over sciatica from sedentary behavior due to recent fall and fracture.  Also mood is starting to be affected by current situation.  The following have been reviewed and updated as appropriate in this visit:  Tobacco  Allergies  Meds  Problems  Med Hx  Surg Hx  Fam Hx  Soc Hx         Current Outpatient Prescriptions:   •  DULoxetine (CYMBALTA) 30 mg capsule, Take 1 capsule (30 mg total) by mouth daily., Disp: 30 capsule, Rfl: 1  Past Medical History:   Diagnosis Date   • Arthritis    • Depression    • History of varicella    • Keratoconus, stable     Carlos Weeks     Past Surgical History:   Procedure Laterality Date   •  SECTION     • FRACTURE SURGERY      right hip   • FRACTURE SURGERY      right wrist     Family History   Problem Relation Age of Onset   • Hypertension Mother    • Glaucoma Mother    • Stroke Father    • Multiple sclerosis Father    • Thyroid cancer Sister      Social History   Substance Use Topics   • Smoking status: Never Smoker   • Smokeless tobacco: Never Used   • Alcohol use Yes     No Known Allergies    Review of Systems   Constitutional: Negative for activity change, appetite change, chills, diaphoresis, fatigue, fever and unexpected weight change.   HENT: Negative for hearing loss, sore throat, trouble swallowing and voice change.    Eyes: Negative for itching and visual disturbance.   Respiratory: Negative for cough, chest tightness, shortness of breath and wheezing.    Cardiovascular: Negative for chest pain, palpitations and leg swelling.   Gastrointestinal: Negative for abdominal pain, blood in stool, constipation, diarrhea, nausea and vomiting.   Endocrine: Negative for cold intolerance, polydipsia and polyuria.   Genitourinary:  "Negative for difficulty urinating, dyspareunia, dysuria, pelvic pain, vaginal bleeding and vaginal discharge.   Musculoskeletal: Positive for arthralgias, back pain and joint swelling. Negative for myalgias.   Skin: Negative for rash.   Neurological: Negative for dizziness, tremors, syncope, weakness, numbness and headaches.   Hematological: Negative for adenopathy. Does not bruise/bleed easily.   Psychiatric/Behavioral: Positive for dysphoric mood. Negative for sleep disturbance. The patient is not nervous/anxious.          Objective   Vitals:    06/25/18 0939   BP: 100/70   BP Location: Right upper arm   Patient Position: Sitting   Pulse: 63   Temp: 36.6 °C (97.9 °F)   TempSrc: Oral   SpO2: 97%   Weight: 61 kg (134 lb 6.4 oz)   Height: 1.689 m (5' 6.5\")       Physical Exam   Constitutional: She appears well-developed and well-nourished.   HENT:   Right Ear: Tympanic membrane, external ear and ear canal normal.   Left Ear: Tympanic membrane, external ear and ear canal normal.   Mouth/Throat: Oropharynx is clear and moist.   Eyes: Conjunctivae and EOM are normal. Pupils are equal, round, and reactive to light.   Neck: Normal range of motion. Carotid bruit is not present. No thyroid mass and no thyromegaly present.   Cardiovascular: Normal rate, regular rhythm, normal heart sounds and intact distal pulses.  Exam reveals no gallop and no friction rub.    No murmur heard.  Pulmonary/Chest: No respiratory distress. She has no wheezes. She has no rales. Right breast exhibits no inverted nipple, no mass, no nipple discharge and no skin change. Left breast exhibits no inverted nipple, no mass, no nipple discharge and no skin change.   Abdominal: Soft. Bowel sounds are normal. She exhibits no distension and no mass. There is no tenderness.   Genitourinary: Uterus normal. No breast tenderness. There is no rash or lesion on the right labia. There is no rash or lesion on the left labia. Cervix exhibits no motion tenderness, no " discharge and no friability. Right adnexum displays no mass, no tenderness and no fullness. Left adnexum displays no mass, no tenderness and no fullness. No vaginal discharge found.   Musculoskeletal: She exhibits no edema.   Cast noted on right wrist   Lymphadenopathy:     She has no cervical adenopathy.   Neurological: She displays no tremor and normal reflexes. No cranial nerve deficit. She exhibits normal muscle tone.   Skin: No lesion and no rash noted.   Psychiatric: Her speech is normal and behavior is normal.       Assessment/Plan   Problem List Items Addressed This Visit     Well female exam with routine gynecological exam - Primary     Vaccines: Teatnus is up to date    Annual Flu vaccine recommended seasonally    Shingrix : discussed    Labs:Not needed  Dental Visits: completed    every 6 months  Vision screenings : completed every 1-2 years  Skin cancer screen : annually through our office    Pap: Collected today   Mammo:   ordered today  Colonoscopy: Up to date, Due every10 years  Dexa Scan:Due.         Relevant Orders    Cytology, Thinprep Pap    PAP AND HR HPV W/REFLEX TO GENOTYPING 16,18/45    Sciatica of right side     Discussed treatment of sciatica with Cymbalta as this may also improve mood as well.  Patient will give this a try and follow-up with me in 4-6 weeks.  Improvement should also occur when she is able to better exercise and revealed strength.           Other Visit Diagnoses     Screening for breast cancer        Relevant Orders    BI SCREENING MAMMOGRAM BILATERAL    Screening for osteoporosis        Relevant Orders    DEXA BONE DENSITY    Screening for cervical cancer        Relevant Orders    Cytology, Thinprep Pap    PAP AND HR HPV W/REFLEX TO GENOTYPING 16,18/45

## 2018-06-26 RX ORDER — DULOXETIN HYDROCHLORIDE 30 MG/1
30 CAPSULE, DELAYED RELEASE ORAL DAILY
Qty: 30 CAPSULE | Refills: 1 | Status: SHIPPED | OUTPATIENT
Start: 2018-06-26 | End: 2018-06-27 | Stop reason: SDUPTHER

## 2018-06-26 ASSESSMENT — ENCOUNTER SYMPTOMS
ADENOPATHY: 0
WEAKNESS: 0
CHILLS: 0
WHEEZING: 0
BRUISES/BLEEDS EASILY: 0
CONSTIPATION: 0
VOICE CHANGE: 0
ARTHRALGIAS: 1
SLEEP DISTURBANCE: 0
POLYDIPSIA: 0
APPETITE CHANGE: 0
DIARRHEA: 0
SORE THROAT: 0
NAUSEA: 0
BACK PAIN: 1
NUMBNESS: 0
TROUBLE SWALLOWING: 0
ACTIVITY CHANGE: 0
EYE ITCHING: 0
DIFFICULTY URINATING: 0
FEVER: 0
TREMORS: 0
PALPITATIONS: 0
DYSPHORIC MOOD: 1
VOMITING: 0
UNEXPECTED WEIGHT CHANGE: 0
BLOOD IN STOOL: 0
HEADACHES: 0
NERVOUS/ANXIOUS: 0
DIAPHORESIS: 0
MYALGIAS: 0
JOINT SWELLING: 1
CHEST TIGHTNESS: 0
DYSURIA: 0
DIZZINESS: 0
FATIGUE: 0
SHORTNESS OF BREATH: 0
COUGH: 0
ABDOMINAL PAIN: 0

## 2018-06-27 NOTE — ASSESSMENT & PLAN NOTE
Vaccines: Teatnus is up to date    Annual Flu vaccine recommended seasonally    Shingrix : discussed    Labs:Not needed  Dental Visits: completed    every 6 months  Vision screenings : completed every 1-2 years  Skin cancer screen : annually through our office    Pap: Collected today   Mammo:   ordered today  Colonoscopy: Up to date, Due every10 years  Dexa Scan:Due.

## 2018-06-27 NOTE — ASSESSMENT & PLAN NOTE
Discussed treatment of sciatica with Cymbalta as this may also improve mood as well.  Patient will give this a try and follow-up with me in 4-6 weeks.  Improvement should also occur when she is able to better exercise and revealed strength.

## 2018-07-02 LAB
CLINICAL INFO: ABNORMAL
CYTO CVX: ABNORMAL
CYTOLOGIST CVX/VAG CYTO: ABNORMAL
CYTOLOGIST CVX/VAG CYTO: ABNORMAL
CYTOLOGY CMNT CVX/VAG CYTO-IMP: ABNORMAL
DATE OF PREVIOUS PAP: ABNORMAL
DATE PREVIOUS BX: ABNORMAL
GEN CATEG CVX/VAG CYTO-IMP: ABNORMAL
HPV E6+E7 MRNA CVX QL NAA+PROBE: DETECTED
LMP START DATE: ABNORMAL
PATHOLOGIST CVX/VAG CYTO: ABNORMAL
QUEST COMMENT (PAP): ABNORMAL
SPECIMEN SOURCE CVX/VAG CYTO: ABNORMAL
STAT OF ADQ CVX/VAG CYTO-IMP: ABNORMAL

## 2018-07-05 ENCOUNTER — HOSPITAL ENCOUNTER (OUTPATIENT)
Dept: RADIOLOGY | Age: 58
Discharge: HOME | End: 2018-07-05
Attending: FAMILY MEDICINE
Payer: COMMERCIAL

## 2018-07-05 ENCOUNTER — TELEPHONE (OUTPATIENT)
Dept: INTERNAL MEDICINE | Facility: CLINIC | Age: 58
End: 2018-07-05

## 2018-07-05 DIAGNOSIS — Z12.39 SCREENING FOR BREAST CANCER: ICD-10-CM

## 2018-07-05 DIAGNOSIS — Z13.820 SCREENING FOR OSTEOPOROSIS: ICD-10-CM

## 2018-07-05 PROCEDURE — 77080 DXA BONE DENSITY AXIAL: CPT

## 2018-07-05 PROCEDURE — 77067 SCR MAMMO BI INCL CAD: CPT

## 2018-07-05 NOTE — TELEPHONE ENCOUNTER
LMOM, need to speak to her about pap : ASUS result is widely abnormal but will require colposcopy.  Will refer to Dr. Aimee Smith.    Called again on 7/6/2018 re pap and need for follow up.  Also infomed pt that her mammo was normal and DEXA showed oseopenia.

## 2018-08-23 ENCOUNTER — OFFICE VISIT (OUTPATIENT)
Dept: OBSTETRICS AND GYNECOLOGY | Facility: CLINIC | Age: 58
End: 2018-08-23
Payer: COMMERCIAL

## 2018-08-23 VITALS — SYSTOLIC BLOOD PRESSURE: 110 MMHG | BODY MASS INDEX: 21.72 KG/M2 | DIASTOLIC BLOOD PRESSURE: 65 MMHG | WEIGHT: 136.6 LBS

## 2018-08-23 DIAGNOSIS — B97.7 HIGH RISK HPV INFECTION: ICD-10-CM

## 2018-08-23 DIAGNOSIS — R85.610: Primary | ICD-10-CM

## 2018-08-23 PROCEDURE — 57452 EXAM OF CERVIX W/SCOPE: CPT | Performed by: OBSTETRICS & GYNECOLOGY

## 2018-08-23 PROCEDURE — 99999 PR OFFICE/OUTPT VISIT,PROCEDURE ONLY: CPT | Performed by: OBSTETRICS & GYNECOLOGY

## 2018-08-23 NOTE — PROCEDURES
Colposcopy  Date/Time: 8/23/2018 1:57 PM  Performed by: TOBIAS QUIÑONES  Authorized by: TOBIAS QUIÑONES     Consent:     Consent obtained:  Written    Consent given by:  Patient    Procedural risks discussed:  Bleeding, damage to other organs, failure rate, infection and repeat procedure    Patient questions answered: yes      Patient agrees, verbalizes understanding, and wants to proceed: yes      Instructions and paperwork completed: yes    Indication:     Indication:  ASC-US  Procedure:     Procedure: Colposcopy only      Under satisfactory analgesia the patient was prepped and draped in the dorsal lithotomy position: yes      [place] speculum was placed in the vagina: yes      Under colposcopic examination the transition zone was seen in entirety: no      Intracervical block was performed: no      Tampon inserted: no      Monsel's solution was applied: no      Biopsy(s): no      Specimen to pathology: no    Post-procedure:     Impression: Low grade cervical dysplasia      Patient tolerance of procedure:  Patient tolerated the procedure well with no immediate complications  Comments:      Patient referred by Dr. Arellano for Colposcopy. Patient had Pap performed in her office that resulted in ASCUS/+HR HPV. Patient reports she had an abnormal pap years ago and believes she had a colposcopy. She has gotten follow up paps that were negative until this last one.     I discussed what this pap and +HR HPV means and where it falls on the spectrum of abnormal paps.     Colposcopy was performed. Cervix was stenotic. Attempts to open the cervix were made with a cytobrush, os finder and a uterine sound and were all unsuccessful. Transformation zone was NOT seen, thus making the colposcopy inadequate.     Acetic acid and Lugals solution were placed on cervix and no abnormalities noted.     Discussion was had with patient regarding options going forward. Discussed need to either visualize the TZ or collect ECC. Gave  patient the option of repeating the ECC on another day and administration of vaginal cytotec. Also offered patient option of LEEP procedure. Patient would like to proceed with the LEEP in the hopes that this may also be therapeutic. Will schedule in the next 1-2 months.     Aimee Ballard MD

## 2018-09-04 ENCOUNTER — OFFICE VISIT (OUTPATIENT)
Dept: INTERNAL MEDICINE | Facility: CLINIC | Age: 58
End: 2018-09-04
Payer: COMMERCIAL

## 2018-09-04 VITALS
WEIGHT: 135.8 LBS | TEMPERATURE: 97.9 F | HEART RATE: 67 BPM | OXYGEN SATURATION: 98 % | BODY MASS INDEX: 21.59 KG/M2 | SYSTOLIC BLOOD PRESSURE: 110 MMHG | DIASTOLIC BLOOD PRESSURE: 60 MMHG

## 2018-09-04 DIAGNOSIS — I83.892 VARICOSE VEINS OF LEFT LOWER EXTREMITY WITH EDEMA: ICD-10-CM

## 2018-09-04 DIAGNOSIS — R60.0 UNILATERAL EDEMA OF LOWER EXTREMITY: Primary | ICD-10-CM

## 2018-09-04 PROCEDURE — 99213 OFFICE O/P EST LOW 20 MIN: CPT | Performed by: FAMILY MEDICINE

## 2018-09-04 NOTE — PROGRESS NOTES
Subjective      Patient ID: Giovanna Noble is a 58 y.o. female.    HPI    Left foot swelling, onset 5 days ago, painful from skin strething, but otherwise not painful.   Ice will help a little.    Used a shoe insert Friday to help with the swelling thinking ti was from a leg descrepancy from the fracture on the right hip.  Not red or hot.   No new shoes.    Able to move toes.   Normal sensation.         The following have been reviewed and updated as appropriate in this visit:       Review of Systems    Objective   Vitals:    09/04/18 1409   BP: 110/60   BP Location: Right upper arm   Patient Position: Sitting   Pulse: 67   Temp: 36.6 °C (97.9 °F)   SpO2: 98%   Weight: 61.6 kg (135 lb 12.8 oz)       Physical Exam  2+ edema in the foot extending up to knee on the left.  No redness, no range of motion limitation, no tenderness to palpation.  Assessment/Plan   Problem List Items Addressed This Visit     Unilateral edema of lower extremity - Primary     Edema can be a result of varicose veins and heat as well as favoring that leg and standing for prolonged periods.  However given her dramatic looks it is appropriate to rule out DVT.  Will order ultrasound of the left lower extremity.  If negative for DVT would encourage compression stocking and possibly hydrochlorothiazide.         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

## 2018-09-05 ENCOUNTER — HOSPITAL ENCOUNTER (OUTPATIENT)
Dept: RADIOLOGY | Facility: HOSPITAL | Age: 58
Discharge: HOME | End: 2018-09-05
Attending: FAMILY MEDICINE
Payer: OTHER MISCELLANEOUS

## 2018-09-05 DIAGNOSIS — I83.892 VARICOSE VEINS OF LEFT LOWER EXTREMITY WITH EDEMA: ICD-10-CM

## 2018-09-05 PROBLEM — R60.0 UNILATERAL EDEMA OF LOWER EXTREMITY: Status: ACTIVE | Noted: 2018-09-05
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
## 2018-09-05 PROCEDURE — 93971 EXTREMITY STUDY: CPT | Mod: LT

## 2018-09-05 NOTE — ASSESSMENT & PLAN NOTE
Edema can be a result of varicose veins and heat as well as favoring that leg and standing for prolonged periods.  However given her dramatic looks it is appropriate to rule out DVT.  Will order ultrasound of the left lower extremity.  If negative for DVT would encourage compression stocking and possibly hydrochlorothiazide.

## 2018-09-10 ENCOUNTER — TELEPHONE (OUTPATIENT)
Dept: OBSTETRICS AND GYNECOLOGY | Facility: CLINIC | Age: 58
End: 2018-09-10

## 2018-09-10 PROBLEM — R85.610 PAPANICOLAOU SMEAR OF ANUS WITH ATYPICAL SQUAMOUS CELLS OF UNDETERMINED SIGNIFICANCE (ASC-US): Status: ACTIVE | Noted: 2018-09-10

## 2018-09-10 PROBLEM — B97.7 PAPILLOMAVIRUS, HUMAN: Status: ACTIVE | Noted: 2018-09-10

## 2018-09-10 NOTE — TELEPHONE ENCOUNTER
Called pt returning her call.  I scheduled her surgery for 9.21.18  Pt asked if I can reshedule the surgery until 10.12.18  Told pt I would check the OR schedule and call her tomorrow.    EFRAÍN

## 2018-09-14 ENCOUNTER — TELEPHONE (OUTPATIENT)
Dept: OBSTETRICS AND GYNECOLOGY | Facility: CLINIC | Age: 58
End: 2018-09-14

## 2018-09-14 NOTE — TELEPHONE ENCOUNTER
Called pt returning her call.  Left msg I have a different date for her surgery.  Pt ot call the office.    EFRAÍN

## 2018-10-02 ENCOUNTER — TELEPHONE (OUTPATIENT)
Dept: OBSTETRICS AND GYNECOLOGY | Facility: CLINIC | Age: 58
End: 2018-10-02

## 2018-10-03 ENCOUNTER — TRANSCRIBE ORDERS (OUTPATIENT)
Dept: CARDIOLOGY | Facility: HOSPITAL | Age: 58
End: 2018-10-03

## 2018-10-03 ENCOUNTER — HOSPITAL ENCOUNTER (OUTPATIENT)
Dept: CARDIOLOGY | Facility: HOSPITAL | Age: 58
Discharge: HOME | End: 2018-10-03
Attending: OBSTETRICS & GYNECOLOGY
Payer: COMMERCIAL

## 2018-10-03 ENCOUNTER — CONSULT (OUTPATIENT)
Dept: OBSTETRICS AND GYNECOLOGY | Facility: CLINIC | Age: 58
End: 2018-10-03
Payer: COMMERCIAL

## 2018-10-03 ENCOUNTER — TRANSCRIBE ORDERS (OUTPATIENT)
Dept: LAB | Facility: HOSPITAL | Age: 58
End: 2018-10-03

## 2018-10-03 ENCOUNTER — APPOINTMENT (OUTPATIENT)
Dept: LAB | Facility: HOSPITAL | Age: 58
End: 2018-10-03
Attending: OBSTETRICS & GYNECOLOGY
Payer: COMMERCIAL

## 2018-10-03 VITALS
BODY MASS INDEX: 21.03 KG/M2 | WEIGHT: 134 LBS | DIASTOLIC BLOOD PRESSURE: 60 MMHG | HEIGHT: 67 IN | SYSTOLIC BLOOD PRESSURE: 104 MMHG

## 2018-10-03 DIAGNOSIS — Z01.812 PRE-OPERATIVE LABORATORY EXAMINATION: ICD-10-CM

## 2018-10-03 DIAGNOSIS — Z01.818 PREOP EXAMINATION: Primary | ICD-10-CM

## 2018-10-03 DIAGNOSIS — Z01.812 ENCOUNTER FOR PREPROCEDURAL LABORATORY EXAMINATION: Primary | ICD-10-CM

## 2018-10-03 DIAGNOSIS — Z01.812 ENCOUNTER FOR PREPROCEDURAL LABORATORY EXAMINATION: ICD-10-CM

## 2018-10-03 DIAGNOSIS — Z01.812 PRE-OPERATIVE LABORATORY EXAMINATION: Primary | ICD-10-CM

## 2018-10-03 LAB
ABO + RH BLD: NORMAL
ATRIAL RATE: 55
BLD GP AB SCN SERPL QL: NEGATIVE
D AG BLD QL: POSITIVE
ERYTHROCYTE [DISTWIDTH] IN BLOOD BY AUTOMATED COUNT: 12.3 % (ref 11.7–14.4)
HCT VFR BLDCO AUTO: 39.5 % (ref 35–45)
HGB BLD-MCNC: 12.9 G/DL (ref 11.8–15.7)
LABORATORY COMMENT REPORT: NORMAL
MCH RBC QN AUTO: 30.4 PG (ref 28–33.2)
MCHC RBC AUTO-ENTMCNC: 32.7 G/DL (ref 32.2–35.5)
MCV RBC AUTO: 92.9 FL (ref 83–98)
P AXIS: 76
PDW BLD AUTO: 11.2 FL (ref 9.4–12.3)
PLATELET # BLD AUTO: 224 K/UL (ref 150–369)
PR INTERVAL: 164
QRS DURATION: 88
QT INTERVAL: 424
QTC CALCULATION(BAZETT): 405
R AXIS: 53
RBC # BLD AUTO: 4.25 M/UL (ref 3.93–5.22)
T WAVE AXIS: 69
VENTRICULAR RATE: 55
WBC # BLD AUTO: 6.31 K/UL (ref 3.8–10.5)

## 2018-10-03 PROCEDURE — 86901 BLOOD TYPING SEROLOGIC RH(D): CPT

## 2018-10-03 PROCEDURE — 93010 ELECTROCARDIOGRAM REPORT: CPT | Performed by: INTERNAL MEDICINE

## 2018-10-03 PROCEDURE — 99024 POSTOP FOLLOW-UP VISIT: CPT | Performed by: OBSTETRICS & GYNECOLOGY

## 2018-10-03 PROCEDURE — 36415 COLL VENOUS BLD VENIPUNCTURE: CPT

## 2018-10-03 PROCEDURE — 85027 COMPLETE CBC AUTOMATED: CPT

## 2018-10-03 PROCEDURE — 93005 ELECTROCARDIOGRAM TRACING: CPT

## 2018-10-04 NOTE — PROGRESS NOTES
" Patient ID: Giovanna Noble   : 1960 58 y.o.  MRN: [id number]   Visit Date: 10/4/2018    Subjective   Giovanna Noble is presenting today for Pre-op Visit (LEEP)      HPI    Patient presents for Pre-op visit for LEEP procedure. Patient referred to me by Dr. Arellano secondary to ASCUS/+HRHPV pap. I attempted colpo in the office. There were no lesions of the face of the cervix but I was unable to visualize the transformation zone due to cervical stenosis. Attempts were made in the office to open the cervix, but we were unsuccessful. Discussed options and patient elected for LEEP to assess Endocervical Canal.       Past Medical History:  has a past medical history of Abnormal Pap smear of cervix; Acne; Arthritis; Asthma; Breast cancer screening (2018); Cardiac dysrhythmia; Colitis, ulcerative (CMS/HCC) (); Depression; Endocrine disorder (); History of varicella; Lyme disease; Keratoconus, stable; Osteopenia (2018); Pap smear for cervical cancer screening (2018); and Rosacea.     Past Surgical History:  has a past surgical history that includes  section; Fracture surgery; Fracture surgery; and Hip surgery (2018).    Obstetric History:   OB History    Para Term  AB Living   2 2 2     2   SAB TAB Ectopic Multiple Live Births           2      # Outcome Date GA Lbr Emeterio/2nd Weight Sex Delivery Anes PTL Lv   2 Term 06 38w0d   F CS-Unspec   LILIAN   1 Term  40w0d   M CS-Unspec   LILIAN          Medications: No current outpatient prescriptions on file.      Allergies: has No Known Allergies.       Vital Signs for this encounter: /60 (BP Location: Left upper arm, Patient Position: Sitting)   Ht 1.702 m (5' 7\")   Wt 60.8 kg (134 lb)   LMP  (LMP Unknown)   BMI 20.99 kg/m²     OBGyn Exam  General Appearance: Alert, cooperative, no acute distress  Head: Normocephalic, without obvious abnormality  Respiratory: CTAB  Cardiovascular: RRR  Abdomen: Soft, nontender, " nondistended,no masses, no organomegaly  Genitalia: Deferred  Extremities: no edema      Impression/Plan:    58 y.o. is presenting today for Pre-op Visit (LEEP)      1. Patient is undergoing a LEEP  for an ASCUS/HR HPV Pap and an inadequate colposcopy.   2.Procedure was explained, risks and benefits were discussed, all questions answered. 3.Written consent was obtained.   4. Patient had PATs today, labs were drawn at that time.   5. Hospital stay and recovery expectations reviewed.     Aimee Ballard MD

## 2018-10-05 ENCOUNTER — HOSPITAL ENCOUNTER (OUTPATIENT)
Facility: HOSPITAL | Age: 58
Setting detail: HOSPITAL OUTPATIENT SURGERY
Discharge: HOME | End: 2018-10-05
Attending: OBSTETRICS & GYNECOLOGY | Admitting: OBSTETRICS & GYNECOLOGY
Payer: COMMERCIAL

## 2018-10-05 ENCOUNTER — ANESTHESIA (OUTPATIENT)
Dept: SURGERY | Facility: HOSPITAL | Age: 58
Setting detail: HOSPITAL OUTPATIENT SURGERY
End: 2018-10-05
Payer: COMMERCIAL

## 2018-10-05 VITALS
DIASTOLIC BLOOD PRESSURE: 60 MMHG | OXYGEN SATURATION: 100 % | SYSTOLIC BLOOD PRESSURE: 120 MMHG | RESPIRATION RATE: 17 BRPM | HEART RATE: 64 BPM | TEMPERATURE: 97.6 F

## 2018-10-05 DIAGNOSIS — B97.7: ICD-10-CM

## 2018-10-05 DIAGNOSIS — R85.610: ICD-10-CM

## 2018-10-05 PROCEDURE — 25000000 HC PHARMACY GENERAL: Performed by: ANESTHESIOLOGY

## 2018-10-05 PROCEDURE — 25000000 HC PHARMACY GENERAL: Performed by: OBSTETRICS & GYNECOLOGY

## 2018-10-05 PROCEDURE — 71000002 HC PACU PHASE 2 INITIAL 30MIN: Performed by: OBSTETRICS & GYNECOLOGY

## 2018-10-05 PROCEDURE — 25800000 HC PHARMACY IV SOLUTIONS: Performed by: OBSTETRICS & GYNECOLOGY

## 2018-10-05 PROCEDURE — 63600000 HC DRUGS/DETAIL CODE: Performed by: NURSE ANESTHETIST, CERTIFIED REGISTERED

## 2018-10-05 PROCEDURE — 27200000 HC STERILE SUPPLY: Performed by: OBSTETRICS & GYNECOLOGY

## 2018-10-05 PROCEDURE — 0UBC7ZX EXCISION OF CERVIX, VIA NATURAL OR ARTIFICIAL OPENING, DIAGNOSTIC: ICD-10-PCS | Performed by: OBSTETRICS & GYNECOLOGY

## 2018-10-05 PROCEDURE — 37000002 HC ANESTHESIA MAC: Performed by: OBSTETRICS & GYNECOLOGY

## 2018-10-05 PROCEDURE — 88307 TISSUE EXAM BY PATHOLOGIST: CPT | Performed by: OBSTETRICS & GYNECOLOGY

## 2018-10-05 PROCEDURE — 71000001 HC PACU PHASE 1 INITIAL 30MIN: Performed by: OBSTETRICS & GYNECOLOGY

## 2018-10-05 PROCEDURE — 36000012 HC OR LEVEL 2 EA ADDL MIN: Performed by: OBSTETRICS & GYNECOLOGY

## 2018-10-05 PROCEDURE — 57522 CONIZATION OF CERVIX: CPT | Performed by: OBSTETRICS & GYNECOLOGY

## 2018-10-05 PROCEDURE — 25000000 HC PHARMACY GENERAL: Performed by: NURSE ANESTHETIST, CERTIFIED REGISTERED

## 2018-10-05 PROCEDURE — 71000011 HC PACU PHASE 1 EA ADDL MIN: Performed by: OBSTETRICS & GYNECOLOGY

## 2018-10-05 PROCEDURE — 71000012 HC PACU PHASE 2 EA ADDL MIN: Performed by: OBSTETRICS & GYNECOLOGY

## 2018-10-05 PROCEDURE — 36000002 HC OR LEVEL 2 INITIAL 30MIN: Performed by: OBSTETRICS & GYNECOLOGY

## 2018-10-05 RX ORDER — LIDOCAINE HYDROCHLORIDE 10 MG/ML
INJECTION, SOLUTION EPIDURAL; INFILTRATION; INTRACAUDAL; PERINEURAL AS NEEDED
Status: DISCONTINUED | OUTPATIENT
Start: 2018-10-05 | End: 2018-10-05 | Stop reason: SURG

## 2018-10-05 RX ORDER — DEXTROSE 50 % IN WATER (D50W) INTRAVENOUS SYRINGE
25 AS NEEDED
Status: DISCONTINUED | OUTPATIENT
Start: 2018-10-05 | End: 2018-10-05 | Stop reason: HOSPADM

## 2018-10-05 RX ORDER — PROPOFOL 200MG/20ML
SYRINGE (ML) INTRAVENOUS AS NEEDED
Status: DISCONTINUED | OUTPATIENT
Start: 2018-10-05 | End: 2018-10-05 | Stop reason: SURG

## 2018-10-05 RX ORDER — FENTANYL CITRATE 50 UG/ML
INJECTION, SOLUTION INTRAMUSCULAR; INTRAVENOUS AS NEEDED
Status: DISCONTINUED | OUTPATIENT
Start: 2018-10-05 | End: 2018-10-05 | Stop reason: SURG

## 2018-10-05 RX ORDER — KETOROLAC TROMETHAMINE 30 MG/ML
INJECTION, SOLUTION INTRAMUSCULAR; INTRAVENOUS AS NEEDED
Status: DISCONTINUED | OUTPATIENT
Start: 2018-10-05 | End: 2018-10-05 | Stop reason: SURG

## 2018-10-05 RX ORDER — HYDROMORPHONE HYDROCHLORIDE 2 MG/ML
.25-.5 INJECTION, SOLUTION INTRAMUSCULAR; INTRAVENOUS; SUBCUTANEOUS
Status: DISCONTINUED | OUTPATIENT
Start: 2018-10-05 | End: 2018-10-05 | Stop reason: HOSPADM

## 2018-10-05 RX ORDER — IBUPROFEN 200 MG
16-32 TABLET ORAL AS NEEDED
Status: DISCONTINUED | OUTPATIENT
Start: 2018-10-05 | End: 2018-10-05 | Stop reason: HOSPADM

## 2018-10-05 RX ORDER — MIDAZOLAM HYDROCHLORIDE 2 MG/2ML
INJECTION, SOLUTION INTRAMUSCULAR; INTRAVENOUS AS NEEDED
Status: DISCONTINUED | OUTPATIENT
Start: 2018-10-05 | End: 2018-10-05 | Stop reason: SURG

## 2018-10-05 RX ORDER — ONDANSETRON 4 MG/1
4 TABLET, ORALLY DISINTEGRATING ORAL EVERY 8 HOURS PRN
Status: DISCONTINUED | OUTPATIENT
Start: 2018-10-05 | End: 2018-10-05 | Stop reason: HOSPADM

## 2018-10-05 RX ORDER — SODIUM CHLORIDE 9 MG/ML
INJECTION, SOLUTION INTRAVENOUS CONTINUOUS
Status: DISCONTINUED | OUTPATIENT
Start: 2018-10-05 | End: 2018-10-05 | Stop reason: HOSPADM

## 2018-10-05 RX ORDER — PROPOFOL 10 MG/ML
INJECTION, EMULSION INTRAVENOUS CONTINUOUS PRN
Status: DISCONTINUED | OUTPATIENT
Start: 2018-10-05 | End: 2018-10-05 | Stop reason: SURG

## 2018-10-05 RX ORDER — OXYCODONE HYDROCHLORIDE 5 MG/1
5-10 TABLET ORAL EVERY 4 HOURS PRN
Status: DISCONTINUED | OUTPATIENT
Start: 2018-10-05 | End: 2018-10-05 | Stop reason: HOSPADM

## 2018-10-05 RX ORDER — ACETAMINOPHEN 325 MG/1
650 TABLET ORAL EVERY 4 HOURS PRN
Status: DISCONTINUED | OUTPATIENT
Start: 2018-10-05 | End: 2018-10-05 | Stop reason: HOSPADM

## 2018-10-05 RX ORDER — EPHEDRINE SULFATE/0.9% NACL/PF 50 MG/5 ML
SYRINGE (ML) INTRAVENOUS AS NEEDED
Status: DISCONTINUED | OUTPATIENT
Start: 2018-10-05 | End: 2018-10-05 | Stop reason: SURG

## 2018-10-05 RX ORDER — DEXTROSE 40 %
15-30 GEL (GRAM) ORAL AS NEEDED
Status: DISCONTINUED | OUTPATIENT
Start: 2018-10-05 | End: 2018-10-05 | Stop reason: HOSPADM

## 2018-10-05 RX ORDER — ONDANSETRON HYDROCHLORIDE 2 MG/ML
INJECTION, SOLUTION INTRAVENOUS AS NEEDED
Status: DISCONTINUED | OUTPATIENT
Start: 2018-10-05 | End: 2018-10-05 | Stop reason: SURG

## 2018-10-05 RX ORDER — LIDOCAINE HYDROCHLORIDE AND EPINEPHRINE 10; 10 UG/ML; MG/ML
INJECTION, SOLUTION INFILTRATION; PERINEURAL AS NEEDED
Status: DISCONTINUED | OUTPATIENT
Start: 2018-10-05 | End: 2018-10-05 | Stop reason: HOSPADM

## 2018-10-05 RX ADMIN — ONDANSETRON 4 MG: 2 INJECTION INTRAMUSCULAR; INTRAVENOUS at 15:52

## 2018-10-05 RX ADMIN — MIDAZOLAM HYDROCHLORIDE 2 MG: 1 INJECTION, SOLUTION INTRAMUSCULAR; INTRAVENOUS at 15:48

## 2018-10-05 RX ADMIN — PROPOFOL 100 MCG/KG/MIN: 10 INJECTION, EMULSION INTRAVENOUS at 15:52

## 2018-10-05 RX ADMIN — Medication 5 MG: at 16:08

## 2018-10-05 RX ADMIN — SODIUM CHLORIDE: 9 INJECTION, SOLUTION INTRAVENOUS at 14:02

## 2018-10-05 RX ADMIN — KETOROLAC TROMETHAMINE 30 MG: 30 INJECTION, SOLUTION INTRAMUSCULAR at 16:37

## 2018-10-05 RX ADMIN — Medication 5 MG: at 16:10

## 2018-10-05 RX ADMIN — FENTANYL CITRATE 50 MCG: 50 INJECTION, SOLUTION INTRAMUSCULAR; INTRAVENOUS at 15:52

## 2018-10-05 RX ADMIN — PROPOFOL 60 MG: 10 INJECTION, EMULSION INTRAVENOUS at 15:52

## 2018-10-05 RX ADMIN — LIDOCAINE HYDROCHLORIDE 5 ML: 10 INJECTION, SOLUTION EPIDURAL; INFILTRATION; INTRACAUDAL; PERINEURAL at 15:52

## 2018-10-05 ASSESSMENT — ENCOUNTER SYMPTOMS: DEPRESSION: 1

## 2018-10-05 ASSESSMENT — PAIN - FUNCTIONAL ASSESSMENT: PAIN_FUNCTIONAL_ASSESSMENT: 0-10

## 2018-10-05 NOTE — ANESTHESIA PREPROCEDURE EVALUATION
Anesthesia ROS/MED HX    Anesthesia History    Previous anesthetics  No history of anesthetic complications  Pulmonary - neg  Neuro/Psych    Depression  Cardiovascular   dyslipidemia  Hematological - neg  GI/Hepatic   inflammatory bowel disease (UC)  Musculoskeletal- neg  Renal Disease- neg  Endo/Other   Diabetes (Pre-diabetes)  Body Habitus: Normal  ROS/MED HX Comments:    Anesthesia History: LEEP procedure due to cervical stenosis leading to difficulty in office procedure   Musculoskeletal: Right sciatica        Patient Active Problem List   Diagnosis   • Closed fracture of right wrist   • Closed fracture of neck of right femur (CMS/[facility]) ([facility])   • Depression   • Dry eyes, bilateral   • Lipids abnormal   • Well female exam with routine gynecological exam   • Sciatica of right side   • Keratoconus, stable   • Unilateral edema of lower extremity   • Papillomavirus, human   • Papanicolaou smear of anus with atypical squamous cells of undetermined significance (ASC-US)        Past Medical History:   Diagnosis Date   • Abnormal Pap smear of cervix    • Arthritis    • Breast cancer screening 2018    birads 1   • Cardiac dysrhythmia    • Colitis, ulcerative (CMS/[facility]) ([facility])    • Depression    • Endocrine disorder 2016    Pre Diabetes   • History of varicella    • Hx of Lyme disease     patient denies this on 10/4/18   • Keratoconus, stable     Carlos Weeks   • Osteopenia 2018   • Pap smear for cervical cancer screening 2018    abnormal pap, hpv abnormal   • Rosacea        Past Surgical History:   Procedure Laterality Date   •  SECTION     • FRACTURE SURGERY      right hip   • FRACTURE SURGERY      right wrist   • HIP SURGERY  2018       Current Facility-Administered Medications   Medication Dose Route Frequency   • sodium chloride 0.9 %   intravenous Continuous       Prior to Admission medications    Not on File       CBC Results       10/03/18 05/07/18 05/06/18                    3140  1021 0604         WBC 6.31 8.28 10.18         RBC 4.25 4.11 4.23         HGB 12.9 12.3 12.7         HCT 39.5 37.8 39.3         MCV 92.9 92.0 92.9         MCH 30.4 29.9 30.0         MCHC 32.7 32.5 32.3          177 178                       BMP Results       05/07/18 05/06/18 05/05/18                    1021 0604 2324          140 140         K 3.8 3.9 3.2 (L)         Cl 108 106 103         CO2 24 28 30         Glucose 122 (H) 112 (H) 114 (H)         BUN 8 14 14         Creatinine 0.4 (L) 0.5 (L) 0.6         Calcium 9.2 9.5 9.7         Anion Gap 7 6 7         EGFR &gt;60.0 &gt;60.0 &gt;60.0         Comment for K at 2324 on 05/05/18:    Results obtained on plasma. Plasma Potassium values may be up to 0.4 mEQ/L less than serum values. The differences may be greater for patients with high platelet or white cell counts.          No results found for: HCGPREGUR, PREGSERUM, HCG, HCGQUANT          No orders to display       Physical Exam    Airway   Mallampati: I   TM distance: >3 FB   Neck ROM: full  Cardiovascular - normal   Rhythm: regular   Rate: normal  Pulmonary - normal  Dental - normal        Anesthesia Plan    Plan: MAC    Technique: MAC     Lines and Monitors: PIV     Airway: natural airway / supplemental oxygen   ASA 2  Anesthetic plan and risks discussed with: patient  Induction:    intravenous   Postop Plan:   Patient Disposition: phase II then home   Pain Management: IV analgesics

## 2018-10-05 NOTE — PERIOPERATIVE NURSING NOTE
Vss denies pain cheyanne pad with small amount of bloody drainage . Tolerated po's voided ml removed. Patient verbalized understanding of  Discharge instruction , restriction , follow up and symptoms requiring  medical attention.

## 2018-10-05 NOTE — DISCHARGE INSTRUCTIONS
LEEP/Loop or [facility] Biopsy  Discharge Instructions    What to Expect    It is normal to experience some staining/spotting after this procedure.  Sometimes the discharge may resemble brown/black debris.  This is normal.    You may experience some cramping after the procedure but this usually does not last for more than a few days.  You may take Tylenol or Ibuprofen (Motrin, Advil) as directed for pain.      Activity      Please do not get in a swimming pool, hot tub or tub bath for at least 2 weeks after your procedure.      Please delay sexual intercourse and use of tampons until your first visit following the procedure.  This is at least 2 weeks from the time of procedure.    You may resume activities such as work, light housework, grocery shopping 1-2 days after your procedure. Please delay any strenuous activities such as heavy exercise and housework for an additional 1-2 days.    PLEASE CALL THE OFFICE -[phone] SCHEDULE A FOLLOW-UP EXAM IN 2 WEEKS FROM TIME OF PROCEDURE.    PLEASE CALL THE OFFICE IF YOU EXPERIENCE ANY OF THE FOLLOWING:  - Heavy bleeding.  This is if you are using about 1 pad per hour.  - Fever greater than 100.4  - Foul smelling vaginal discharge   - Worsening pain or any other concerns

## 2018-10-05 NOTE — OP NOTE
Operative Note    Postoperative diagnosis: ASCUS/+HRHPV pap  Procedure: LEEP  Complications: None  Surgeon: Aimee Ballard MD  Assistant: Manjula Cleary MD  Anesthesia: MAC and local 1% lidocaine with epinepherine  Findings: Normal appearing cervix with stenotic os  Estimated blood loss: Less than 100 mL  Specimens: LEEP specimen, ECC    This is a  58 year old female who was noted to have ASCUS/ +HRHPV pap smear. Colpo was performed in the office, but due to extreme cervical stenosis the transformation zone was not appreciated. The patient was counseld on options and elected for a LEEP procedure. Appropriate consents were obtained in the office and reconfirmed upon admission.     On 10/05/18 patient presented to the hospital and denied any significant interval history. The surgeon and patient were mutually identified and the patient was taken to the operating room.  She was given MAC anesthesia. The patient was placed in the dorsal lithotomy position and prepped and draped in the usual sterile fashion. A timeout was performed. Normal appearing external genitalia were appreciated. The patient was then straight cathed for about 10 cc clear urine. A coated speculum was then placed in the vagina and the cervix was easily visualized. A coated single toothed tenaculum was placed on the anterior lip of the cervix. A cervical block was achieved with 10cc of 1% local lidocaine with epinephrine, placed in the cervical stroma in a circumferential fashion. The LEEP procedure was then performed with one pass of the loop with the specimen obtained in one piece. The specimen was then placed in the container and sent to pathology for permanent evaluation. The os was dilated and ECC specimen was collected and sent to pathology for permanent evaluation. Roller ball cautery was used to achieve excellent hemostasis. All instruments were removed from the vagina. Sponge and instrument counts were correct at the completion of the  procedure. The patient tolerated the procedure well, was awoken from anesthesia  and was taken to the PACU in stable condition.     Dr. Ballard was present and scrubbed for the entire procedure.    Manjula Cleary MD

## 2018-10-05 NOTE — ANESTHESIA POSTPROCEDURE EVALUATION
Patient: Giovanna Noble    Procedure Summary     Date:  10/05/18 Room / Location:  [facility]    Anesthesia Start:  1548 Anesthesia Stop:  1638    Procedure:  CERVICAL LEEP/LETZ (LOOP ELECTROSURGICAL EXCISION PROCEDURE) (N/A Cervix) Diagnosis:       Papillomavirus, human      Papanicolaou smear of anus with atypical squamous cells of undetermined significance (ASC-US)      (Papillomavirus, human [B97.7])      (Papanicolaou smear of anus with atypical squamous cells of undetermined significance (ASC-US) [R85.610])    Surgeon:  Aimee Ballard MD Responsible Provider:  Renetta Saldana MD    Anesthesia Type:  MAC ASA Status:  2          Anesthesia Type: MAC  PACU Vitals  10/5/2018 1631 - 10/5/2018 1731      10/5/2018 1642 10/5/2018 1645 10/5/2018 1700 10/5/2018 1715    BP: (!)  105/56 (!)  104/57 (!)  104/56 120/60    Temp: 36.1 °C (97 °F) - - -    Pulse: 62 63 60 72    Resp: 18 (!)  33 14 (!)  40    SpO2: 100 % 100 % 100 % 100 %            Anesthesia Post Evaluation    Pain management: adequate  Mode of pain management: IV medication  Patient participation: complete - patient participated  Level of consciousness: awake and alert  Cardiovascular status: acceptable  Airway Patency: adequate  Respiratory status: acceptable  Hydration status: acceptable  Anesthetic complications: no

## 2018-10-09 NOTE — TELEPHONE ENCOUNTER
Late entry-on 10.2.18 called pt to have her come into the office 10.3.18 for pre-op appt.  Pt agreed will be 10.3.18    MMB

## 2018-10-09 NOTE — H&P (VIEW-ONLY)
" Patient ID: Giovanna Noble   : 1960 58 y.o.  MRN: [id number]   Visit Date: 10/4/2018    Subjective   Giovanna Noble is presenting today for Pre-op Visit (LEEP)      HPI    Patient presents for Pre-op visit for LEEP procedure. Patient referred to me by Dr. Arellano secondary to ASCUS/+HRHPV pap. I attempted colpo in the office. There were no lesions of the face of the cervix but I was unable to visualize the transformation zone due to cervical stenosis. Attempts were made in the office to open the cervix, but we were unsuccessful. Discussed options and patient elected for LEEP to assess Endocervical Canal.       Past Medical History:  has a past medical history of Abnormal Pap smear of cervix; Acne; Arthritis; Asthma; Breast cancer screening (2018); Cardiac dysrhythmia; Colitis, ulcerative (CMS/HCC) (); Depression; Endocrine disorder (); History of varicella; Lyme disease; Keratoconus, stable; Osteopenia (2018); Pap smear for cervical cancer screening (2018); and Rosacea.     Past Surgical History:  has a past surgical history that includes  section; Fracture surgery; Fracture surgery; and Hip surgery (2018).    Obstetric History:   OB History    Para Term  AB Living   2 2 2     2   SAB TAB Ectopic Multiple Live Births           2      # Outcome Date GA Lbr Emeterio/2nd Weight Sex Delivery Anes PTL Lv   2 Term 06 38w0d   F CS-Unspec   LILIAN   1 Term  40w0d   M CS-Unspec   LILIAN          Medications: No current outpatient prescriptions on file.      Allergies: has No Known Allergies.       Vital Signs for this encounter: /60 (BP Location: Left upper arm, Patient Position: Sitting)   Ht 1.702 m (5' 7\")   Wt 60.8 kg (134 lb)   LMP  (LMP Unknown)   BMI 20.99 kg/m²     OBGyn Exam  General Appearance: Alert, cooperative, no acute distress  Head: Normocephalic, without obvious abnormality  Respiratory: CTAB  Cardiovascular: RRR  Abdomen: Soft, nontender, " nondistended,no masses, no organomegaly  Genitalia: Deferred  Extremities: no edema      Impression/Plan:    58 y.o. is presenting today for Pre-op Visit (LEEP)      1. Patient is undergoing a LEEP  for an ASCUS/HR HPV Pap and an inadequate colposcopy.   2.Procedure was explained, risks and benefits were discussed, all questions answered. 3.Written consent was obtained.   4. Patient had PATs today, labs were drawn at that time.   5. Hospital stay and recovery expectations reviewed.     Aimee Ballard MD

## 2018-10-10 ENCOUNTER — TELEPHONE (OUTPATIENT)
Dept: OBSTETRICS AND GYNECOLOGY | Facility: CLINIC | Age: 58
End: 2018-10-10

## 2018-10-10 LAB
CASE RPRT: NORMAL
CLINICAL INFO: NORMAL
PATH REPORT.FINAL DX SPEC: NORMAL
PATH REPORT.FINAL DX SPEC: NORMAL
PATH REPORT.GROSS SPEC: NORMAL

## 2018-10-10 NOTE — TELEPHONE ENCOUNTER
Spoke with patient about pathology results. After reviewing path report with radiologist we concluded that we did get cervical tissue but there was no endocervical component present. Discussed with patient that this again means that we have not fully evaluated her for her abnormal pap yet. She says she is feeling well has not had any bleeding or cramping. Discussed that I would like to follow up with my gynecologic oncologist and then regroup on follow up options. She asked if hysterectomy to get it out was an option for which I responded yes and my be the best option. Will discuss at follow up visit.

## 2018-10-12 ENCOUNTER — HOSPITAL ENCOUNTER (OUTPATIENT)
Dept: RADIOLOGY | Facility: HOSPITAL | Age: 58
Discharge: HOME | End: 2018-10-12
Attending: FAMILY MEDICINE
Payer: COMMERCIAL

## 2018-10-12 DIAGNOSIS — M79.672 LEFT FOOT PAIN: ICD-10-CM

## 2018-10-12 PROCEDURE — 73630 X-RAY EXAM OF FOOT: CPT | Mod: LT

## 2018-10-12 PROCEDURE — 73610 X-RAY EXAM OF ANKLE: CPT | Mod: LT

## 2018-10-17 ENCOUNTER — OFFICE VISIT (OUTPATIENT)
Dept: OBSTETRICS AND GYNECOLOGY | Facility: CLINIC | Age: 58
End: 2018-10-17
Payer: COMMERCIAL

## 2018-10-17 VITALS — BODY MASS INDEX: 21.02 KG/M2 | SYSTOLIC BLOOD PRESSURE: 100 MMHG | DIASTOLIC BLOOD PRESSURE: 60 MMHG | WEIGHT: 134.2 LBS

## 2018-10-17 DIAGNOSIS — Z09 POSTOP CHECK: Primary | ICD-10-CM

## 2018-10-17 PROCEDURE — 99024 POSTOP FOLLOW-UP VISIT: CPT | Performed by: OBSTETRICS & GYNECOLOGY

## 2018-10-17 NOTE — PROGRESS NOTES
Patient ID: Giovanna Noble   : 1960 58 y.o.  MRN: [id number]   Visit Date: 10/17/2018    Subjective   Giovanna Noble is presenting today for Post-op Visit      HPI    Patient is s/p LEEP and ECC on 10/10/18.     Bleeding: Patient is having bleeding after the procedure, but light  Pain: Patient's pain is well controlled. She is not taking pain medications.   Sexual Activity: has not resumed sexual activity.   Physical Activity: has resumed physical activity.   Bowl and Bladder: Patient is not having difficult with her bowel or bladder function.     Past Medical History:  has a past medical history of Abnormal Pap smear of cervix; Arthritis; Breast cancer screening (2018); Cardiac dysrhythmia; Colitis, ulcerative (CMS/HCC) (); Depression; Endocrine disorder (); History of varicella; Lyme disease; Keratoconus, stable; Osteopenia (2018); Pap smear for cervical cancer screening (2018); and Rosacea. She also has no past medical history of Acne or Asthma.     Past Surgical History:  has a past surgical history that includes  section; Fracture surgery; Fracture surgery; and Hip surgery (2018).    Obstetric History:   OB History    Para Term  AB Living   2 2 2     2   SAB TAB Ectopic Multiple Live Births           2      # Outcome Date GA Lbr Emeterio/2nd Weight Sex Delivery Anes PTL Lv   2 Term 06 38w0d   F CS-Unspec   LILIAN   1 Term  40w0d   M CS-Unspec   LILIAN          Medications: No current outpatient prescriptions on file.      Allergies: has No Known Allergies.       Vital Signs for this encounter: /60 (BP Location: Left upper arm, Patient Position: Sitting)   Wt 60.9 kg (134 lb 3.2 oz)   LMP  (LMP Unknown)   BMI 21.02 kg/m²     OBGyn Exam  General Appearance: Alert, cooperative, no acute distress  Head: Normocephalic, without obvious abnormality  Abdomen: Soft, nontender, nondistended,no masses, no organomegaly. Incisions c/d/i. No Si/Sx of  infection  Genitalia: No lesions or masses. No uterine, cervical, or adnexal tenderness. Scant vaginal bleeding. No vaginal discharge, or erythema present. Cervix is still healing, visually 1-2cm dilated.    Extremities: no edema      Impression/Plan:    1. Physical exam significant for an appropriately healing cervix. Not ready to return to sex yet, will return to office for follow up exam in 2 weeks.   2. Pathology. Patient had ASCUS/+H HPV pap, colpo inadequate due to cervical stenosis. LEEP pathology showed portion of denuded fibromuscular stroma and scant squamous mucosa, no endocervical component included in biopsy. ECC negative for cells. Patient still has not been adequately sampled. Options of observing for 1 year,  CKC biopsy, or hysterectomy discussed with patient. She would like to proceed with hysterectomy. If she has to go to or again, she would like everything removed. She is traveling over the holidays and would like to have this done in February. Will schedule at next visit.

## 2018-11-05 ENCOUNTER — OFFICE VISIT (OUTPATIENT)
Dept: OBSTETRICS AND GYNECOLOGY | Facility: CLINIC | Age: 58
End: 2018-11-05
Payer: COMMERCIAL

## 2018-11-05 VITALS
BODY MASS INDEX: 21.19 KG/M2 | SYSTOLIC BLOOD PRESSURE: 110 MMHG | DIASTOLIC BLOOD PRESSURE: 78 MMHG | HEIGHT: 67 IN | WEIGHT: 135 LBS

## 2018-11-05 DIAGNOSIS — Z09 POSTOP CHECK: Primary | ICD-10-CM

## 2018-11-05 PROCEDURE — 99024 POSTOP FOLLOW-UP VISIT: CPT | Performed by: OBSTETRICS & GYNECOLOGY

## 2018-11-06 NOTE — PROGRESS NOTES
"Patient ID: Giovanna Noble   : 1960 58 y.o.  MRN: [id number]   Visit Date: 2018    Subjective   Giovanna Noble is presenting today for Follow-up (LEEP Procedure)      HPI    Patient presents for another post-op check s/p her LEEP procedure on 10/5/18    Bleeding: Patient is not having bleeding after the procedure but it just stopped a few days ago.   Pain: Patient's pain is well controlled. She is not taking pain medications.   Sexual Activity: has not resumed sexual activity.   Physical Activity: has resumed physical activity.   Bowl and Bladder: Patient is not having difficult with her bowel or bladder function.     Past Medical History:  has a past medical history of Abnormal Pap smear of cervix; Arthritis; Breast cancer screening (2018); Cardiac dysrhythmia; Colitis, ulcerative (CMS/HCC) (); Depression; Endocrine disorder (); History of varicella; Lyme disease; Keratoconus, stable; Osteopenia (2018); Pap smear for cervical cancer screening (2018); and Rosacea. She also has no past medical history of Acne or Asthma.     Past Surgical History:  has a past surgical history that includes  section; Fracture surgery; Fracture surgery; and Hip surgery (2018).    Obstetric History:   OB History    Para Term  AB Living   2 2 2     2   SAB TAB Ectopic Multiple Live Births           2      # Outcome Date GA Lbr Emeterio/2nd Weight Sex Delivery Anes PTL Lv   2 Term 06 38w0d   F CS-Unspec   LILIAN   1 Term  40w0d   M CS-Unspec   LILIAN          Medications: No current outpatient prescriptions on file.      Allergies: has No Known Allergies.       Vital Signs for this encounter: /78 (BP Location: Right upper arm, Patient Position: Sitting)   Ht 1.702 m (5' 7\")   Wt 61.2 kg (135 lb)   LMP  (LMP Unknown)   BMI 21.14 kg/m²     OBGyn Exam  General Appearance: Alert, cooperative, no acute distress  Head: Normocephalic, without obvious abnormality  Abdomen: Soft, " nontender, nondistended,no masses, no organomegaly. Incisions c/d/i. No Si/Sx of infection  Genitalia: No lesions or masses. No uterine, cervical, or adnexal tenderness. No vaginal bleeding, vaginal discharge, or erythema present. Cervix is healing well, os almost fully closed.   Extremities: no edema      Impression/Plan:    1. Physical exam normal, patient may return to all activities as tolerated.   2. Plan is for hysterectomy since we were unable to adequately sample patient with colposcopy/ECC and LEEP. Patient declines another excisional procedure. Discussed this plan with Dr. Montenegro and she is agreeable to this. Patient would like to have this done in February. Will work on scheduling. Will do this as [facility] as patient has history of 2 prior c/s. Will need PATs due to history of cardiac arrythmia. Will also need PCP clearance.

## 2018-12-26 ENCOUNTER — PREP FOR CASE (OUTPATIENT)
Dept: OBSTETRICS AND GYNECOLOGY | Facility: CLINIC | Age: 58
End: 2018-12-26

## 2018-12-26 ENCOUNTER — TELEPHONE (OUTPATIENT)
Dept: OBSTETRICS AND GYNECOLOGY | Facility: CLINIC | Age: 58
End: 2018-12-26

## 2018-12-26 DIAGNOSIS — R87.629 ABNORMAL VAGINAL PAP SMEAR: Primary | ICD-10-CM

## 2018-12-26 NOTE — TELEPHONE ENCOUNTER
Called pt to let her know surgery date. Surgery scheduled for 2.15.19 @ 1200.  Pre-op itzel 2.6.19 will check if Dr. Ballard available on 1.18.19 pt will call ext week.    EFRAÍN

## 2019-01-22 ENCOUNTER — TRANSCRIBE ORDERS (OUTPATIENT)
Dept: CARDIOLOGY | Facility: HOSPITAL | Age: 59
End: 2019-01-22

## 2019-01-22 ENCOUNTER — CONSULT (OUTPATIENT)
Dept: OBSTETRICS AND GYNECOLOGY | Facility: CLINIC | Age: 59
End: 2019-01-22
Payer: COMMERCIAL

## 2019-01-22 ENCOUNTER — HOSPITAL ENCOUNTER (OUTPATIENT)
Dept: CARDIOLOGY | Facility: HOSPITAL | Age: 59
Discharge: HOME | End: 2019-01-22
Attending: OBSTETRICS & GYNECOLOGY
Payer: COMMERCIAL

## 2019-01-22 VITALS
WEIGHT: 131 LBS | BODY MASS INDEX: 20.56 KG/M2 | HEIGHT: 67 IN | DIASTOLIC BLOOD PRESSURE: 76 MMHG | SYSTOLIC BLOOD PRESSURE: 124 MMHG

## 2019-01-22 DIAGNOSIS — Z01.818 PREOP EXAMINATION: ICD-10-CM

## 2019-01-22 DIAGNOSIS — Z01.818 PREOP EXAMINATION: Primary | ICD-10-CM

## 2019-01-22 DIAGNOSIS — Z01.818 PRE-OP EXAM: Primary | ICD-10-CM

## 2019-01-22 LAB
ATRIAL RATE: 59
P AXIS: 79
PR INTERVAL: 166
QRS DURATION: 86
QT INTERVAL: 428
QTC CALCULATION(BAZETT): 423
R AXIS: 57
T WAVE AXIS: 69
VENTRICULAR RATE: 59

## 2019-01-22 PROCEDURE — 93005 ELECTROCARDIOGRAM TRACING: CPT | Performed by: OBSTETRICS & GYNECOLOGY

## 2019-01-22 PROCEDURE — 99024 POSTOP FOLLOW-UP VISIT: CPT | Performed by: OBSTETRICS & GYNECOLOGY

## 2019-01-22 PROCEDURE — 93010 ELECTROCARDIOGRAM REPORT: CPT | Performed by: INTERNAL MEDICINE

## 2019-01-22 PROCEDURE — 36415 COLL VENOUS BLD VENIPUNCTURE: CPT | Performed by: OBSTETRICS & GYNECOLOGY

## 2019-01-23 LAB
ABO GROUP BLD: NORMAL
B-HCG SERPL-ACNC: <2 MIU/ML
BLD GP AB SCN SERPL QL: NORMAL
BUN SERPL-MCNC: 16 MG/DL (ref 7–25)
BUN/CREAT SERPL: NORMAL (CALC) (ref 6–22)
CALCIUM SERPL-MCNC: 9.5 MG/DL (ref 8.6–10.4)
CHLORIDE SERPL-SCNC: 108 MMOL/L (ref 98–110)
CO2 SERPL-SCNC: 26 MMOL/L (ref 20–32)
CREAT SERPL-MCNC: 0.67 MG/DL (ref 0.5–1.05)
ERYTHROCYTE [DISTWIDTH] IN BLOOD BY AUTOMATED COUNT: 12.2 % (ref 11–15)
GLUCOSE SERPL-MCNC: 89 MG/DL (ref 65–99)
HCT VFR BLD AUTO: 39.3 % (ref 35–45)
HGB BLD-MCNC: 13.3 G/DL (ref 11.7–15.5)
MCH RBC QN AUTO: 30.7 PG (ref 27–33)
MCHC RBC AUTO-ENTMCNC: 33.8 G/DL (ref 32–36)
MCV RBC AUTO: 90.8 FL (ref 80–100)
PLATELET # BLD AUTO: 234 THOUSAND/UL (ref 140–400)
PMV BLD REES-ECKER: 11.6 FL (ref 7.5–12.5)
POTASSIUM SERPL-SCNC: 4.2 MMOL/L (ref 3.5–5.3)
QUEST EGFR NON-AFR. AMERICAN: 98 ML/MIN/1.73M2
RBC # BLD AUTO: 4.33 MILLION/UL (ref 3.8–5.1)
RH BLD: NORMAL
SODIUM SERPL-SCNC: 143 MMOL/L (ref 135–146)
WBC # BLD AUTO: 5.6 THOUSAND/UL (ref 3.8–10.8)

## 2019-01-25 RX ORDER — CELECOXIB 100 MG/1
400 CAPSULE ORAL
Status: CANCELLED | OUTPATIENT
Start: 2019-01-25

## 2019-01-25 RX ORDER — GABAPENTIN 100 MG/1
600 CAPSULE ORAL
Status: CANCELLED | OUTPATIENT
Start: 2019-01-25

## 2019-01-25 RX ORDER — ACETAMINOPHEN 120 MG/1
650 SUPPOSITORY RECTAL
Status: CANCELLED | OUTPATIENT
Start: 2019-01-25

## 2019-01-25 RX ORDER — SODIUM CHLORIDE, SODIUM LACTATE, POTASSIUM CHLORIDE, CALCIUM CHLORIDE 600; 310; 30; 20 MG/100ML; MG/100ML; MG/100ML; MG/100ML
INJECTION, SOLUTION INTRAVENOUS CONTINUOUS
Status: CANCELLED | OUTPATIENT
Start: 2019-02-01 | End: 2019-02-08

## 2019-01-25 NOTE — PROGRESS NOTES
Patient ID: Giovanna Noble   : 1960 58 y.o.  MRN: [id number]   Visit Date: 2019    Subjective   Giovanna Noble is presenting today for Pre-op Visit      HPI    Patient presents for pre-op visit. She will be undergoing an LAVH, BS, and Cysto on 19.     She is a 57yo  who had an ASCUS/+HR HPV pap with her PCP. I attempted a colposcopy in the office which was not satisfactory as the transformation zone was not visualized. As a result of cervical stenosis I was also not able to collect an ECC. Decision was made to proceed with a LEEP procedure to more adequately assess the endocervical canal.     LEEP with tophat was performed on 10/5/18. Pathology from the procedure showed denuded fibromuscular stroma with scant squamous mucosa present and no cervical tissue present for ECC. No transformation zone present. Patient was given option of repeat LEEP, however she did not want to undergo another procedure when this might result in hysterectomy anyway. She just wanted to proceed with hysterectomy.     I discussed the case with our gyn/onc who said the potential for underlying malignancy with an ASCUS pap is low and she felt fine with me proceeding with the hysterectomy despite not having evaluation of the transformation zone.       Past Medical History:  has a past medical history of Abnormal Pap smear of cervix; Arthritis; Breast cancer screening (2018); Cardiac dysrhythmia; Colitis, ulcerative (CMS/HCC) (HCC); Depression; Endocrine disorder (); History of varicella; Lyme disease; Keratoconus, stable; Osteopenia (2018); Pap smear for cervical cancer screening (2018); and Rosacea. She also has no past medical history of Acne or Asthma.     Past Surgical History:  has a past surgical history that includes  section; Fracture surgery; Fracture surgery; and Hip surgery (2018).    Obstetric History:   OB History    Para Term  AB Living   2 2 2     2   SAB TAB  "Ectopic Multiple Live Births           2      # Outcome Date GA Lbr Emeterio/2nd Weight Sex Delivery Anes PTL Lv   2 Term 02/08/06 38w0d   F CS-Unspec   LILIAN   1 Term 2005 40w0d   M CS-Unspec   LILIAN          Medications: No current outpatient prescriptions on file.      Allergies: has No Known Allergies.       Vital Signs for this encounter: /76 (BP Location: Right upper arm, Patient Position: Sitting)   Ht 1.702 m (5' 7\")   Wt 59.4 kg (131 lb)   LMP  (LMP Unknown)   BMI 20.52 kg/m²     OBGyn Exam  General Appearance: Alert, cooperative, no acute distress  Head: Normocephalic, without obvious abnormality  Respiratory: CTAB  Cardiovascular: RRR  Abdomen: Soft, nontender, nondistended,no masses, no organomegaly  Genitalia: deferred  Extremities: no edema      Impression/Plan:    58 y.o. is presenting today for Pre-op Visit      1. Patient is undergoing a LAVH, BS, Cysto  for Cervical Dysplasia.   2.Procedure was explained, risks and benefits were discussed, all questions answered.   3.Written consent was obtained.   4. Patient preop labs were drawn in the office today. She was sent for a preoperative EKG.   5. Hospital stay and recovery expectations reviewed.   6. Patient was counseled on the need for further surgery and gynon consult should the pathology result in an underlying malignancy.      Aimee Ballard MD  "

## 2019-01-25 NOTE — H&P
Obstetrics and Gynecology   Pre-OP   History and Physical    HPI      Patient is a 59yo  who had an ASCUS/+HR HPV pap with her PCP. I attempted a colposcopy in the office which was not satisfactory as the transformation zone was not visualized. As a result of cervical stenosis I was also not able to collect an ECC. Decision was made to proceed with a LEEP procedure to more adequately assess the endocervical canal.      LEEP with tophat was performed on 10/5/18. Pathology from the procedure showed denuded fibromuscular stroma with scant squamous mucosa present and no cervical tissue present for ECC. No transformation zone present. Patient was given option of repeat LEEP, however she did not want to undergo another procedure when this might result in hysterectomy anyway. She just wanted to proceed with hysterectomy.      I discussed the case with our gyn/onc who said the potential for underlying malignancy with an ASCUS pap is low and she felt fine with me proceeding with the hysterectomy despite not having evaluation of the transformation zone.       OB History:   Obstetric History       T2      L2     SAB0   TAB0   Ectopic0   Multiple0   Live Births2       # Outcome Date GA Lbr Emeterio/2nd Weight Sex Delivery Anes PTL Lv   2 Term 06 38w0d   F CS-Unspec   LILIAN   1 Term  40w0d   M CS-Unspec   LILIAN          Medical History:   Past Medical History:   Diagnosis Date   • Abnormal Pap smear of cervix    • Arthritis    • Breast cancer screening 2018    birads 1   • Cardiac dysrhythmia    • Colitis, ulcerative (CMS/HCC) (HCC)    • Depression    • Endocrine disorder 2016    Pre Diabetes   • History of varicella    • Hx of Lyme disease     patient denies this on 10/4/18   • Keratoconus, stable     Carlos Weeks   • Osteopenia 2018   • Pap smear for cervical cancer screening 2018    abnormal pap, hpv abnormal   • Rosacea        Surgical History:   Past Surgical History:   Procedure  Laterality Date   •  SECTION     • FRACTURE SURGERY      right hip   • FRACTURE SURGERY      right wrist   • HIP SURGERY  2018       Allergies: Patient has no known allergies.    Medications:   Prior to Admission medications    Medication Sig Start Date End Date Taking? Authorizing Provider   aspirin 81 mg enteric coated tablet Take 1 tablet (81 mg total) by mouth 2 (two) times a day. 18  Livan Barton MD       Family History:   Family History   Problem Relation Age of Onset   • Hypertension Mother    • Glaucoma Mother    • Stroke Father    • Multiple sclerosis Father    • Hypertension Father    • Heart attack Father    • Thyroid cancer Sister    • Alcohol abuse Son    • No Known Problems Brother        Social History:   Social History     Social History Narrative   • No narrative on file       Review of Systems:  All other systems reviewed and negative except as noted in the HPI.    Vital Signs:  Bp:   Weight:    OBGyn Exam   General Appearance: Alert, cooperative, no acute distress  Head: Normocephalic, without obvious abnormality  Respiratory: CTAB  Cardiovascular: RRR  Abdomen: Soft, nontender, nondistended,no masses, no organomegaly  Genitalia: deferred  Extremities: no edema    CBC Results       01/22/19 10/03/18 05/07/18                    1036 1238 1021         WBC 5.6 6.31 8.28         RBC 4.33 4.25 4.11         HGB 13.3 12.9 12.3         HCT 39.3 39.5 37.8         MCV 90.8 92.9 92.0         MCH 30.7 30.4 29.9         MCHC 33.8 32.7 32.5          224 177                     BMP Results       19                    1036 1021 0604          139 140         K 4.2 3.8 3.9         Cl 108 108 106         CO2 26 24 28         Glucose 89 122 (H) 112 (H)         BUN - 8 14         Creatinine 0.67 0.4 (L) 0.5 (L)         Calcium 9.5 9.2 9.5         Anion Gap - 7 6         EGFR 98 &gt;60.0 &gt;60.0          114           Comment for Glucose at 1036 on  01/22/19:                Fasting reference interval         Comment for Creatinine at 1036 on 01/22/19:  For patients >49 years of age, the reference limit  for Creatinine is approximately 13% higher for people  identified as -American.             Negative HCG  EKG Sinus bradycardia      Assessment/Plan     1. Admit to service of [facility] (Dr. Ballard)  2. NPO  3. SCDs to lower extremities  3. Labs: None  4. Pre-OP Medications:   Antibiotics: 2gm Ancex x1 IV Pre-op   Gabapentin 600mg PO x1 Pre-op   Celecoxib 400mg PO x1 Pre-op   Acetaminophen 1,000 oral suspension PO x1 Pre-op    Aimee Ballard MD

## 2019-01-31 ENCOUNTER — TELEPHONE (OUTPATIENT)
Dept: OBSTETRICS AND GYNECOLOGY | Facility: CLINIC | Age: 59
End: 2019-01-31

## 2019-02-01 ENCOUNTER — ANESTHESIA EVENT (OUTPATIENT)
Dept: SURGERY | Facility: HOSPITAL | Age: 59
Setting detail: HOSPITAL OUTPATIENT SURGERY
DRG: 743 | End: 2019-02-01
Payer: COMMERCIAL

## 2019-02-01 ENCOUNTER — HOSPITAL ENCOUNTER (INPATIENT)
Facility: HOSPITAL | Age: 59
LOS: 1 days | Discharge: HOME | DRG: 743 | End: 2019-02-02
Attending: OBSTETRICS & GYNECOLOGY | Admitting: OBSTETRICS & GYNECOLOGY
Payer: COMMERCIAL

## 2019-02-01 DIAGNOSIS — R87.629 ABNORMAL VAGINAL PAP SMEAR: ICD-10-CM

## 2019-02-01 LAB
ABO + RH BLD: NORMAL
BLD GP AB SCN SERPL QL: NEGATIVE
D AG BLD QL: POSITIVE
LABORATORY COMMENT REPORT: NORMAL

## 2019-02-01 PROCEDURE — 58571 TLH W/T/O 250 G OR LESS: CPT | Performed by: OBSTETRICS & GYNECOLOGY

## 2019-02-01 PROCEDURE — 63600000 HC DRUGS/DETAIL CODE: Performed by: OBSTETRICS & GYNECOLOGY

## 2019-02-01 PROCEDURE — 88305 TISSUE EXAM BY PATHOLOGIST: CPT | Performed by: OBSTETRICS & GYNECOLOGY

## 2019-02-01 PROCEDURE — 63700000 HC SELF-ADMINISTRABLE DRUG: Performed by: OBSTETRICS & GYNECOLOGY

## 2019-02-01 PROCEDURE — 63600000 HC DRUGS/DETAIL CODE: Performed by: NURSE ANESTHETIST, CERTIFIED REGISTERED

## 2019-02-01 PROCEDURE — 63600000 HC DRUGS/DETAIL CODE: Performed by: ANESTHESIOLOGY

## 2019-02-01 PROCEDURE — 25000000 HC PHARMACY GENERAL: Performed by: NURSE ANESTHETIST, CERTIFIED REGISTERED

## 2019-02-01 PROCEDURE — 71000011 HC PACU PHASE 1 EA ADDL MIN: Performed by: OBSTETRICS & GYNECOLOGY

## 2019-02-01 PROCEDURE — 27200000 HC STERILE SUPPLY: Performed by: OBSTETRICS & GYNECOLOGY

## 2019-02-01 PROCEDURE — 36000004 HC OR LEVEL 4 INITIAL 30MIN: Performed by: OBSTETRICS & GYNECOLOGY

## 2019-02-01 PROCEDURE — 36000014 HC OR LEVEL 4 EA ADDL MIN: Performed by: OBSTETRICS & GYNECOLOGY

## 2019-02-01 PROCEDURE — 0UT74ZZ RESECTION OF BILATERAL FALLOPIAN TUBES, PERCUTANEOUS ENDOSCOPIC APPROACH: ICD-10-PCS | Performed by: OBSTETRICS & GYNECOLOGY

## 2019-02-01 PROCEDURE — 0TJB8ZZ INSPECTION OF BLADDER, VIA NATURAL OR ARTIFICIAL OPENING ENDOSCOPIC: ICD-10-PCS | Performed by: OBSTETRICS & GYNECOLOGY

## 2019-02-01 PROCEDURE — 86901 BLOOD TYPING SEROLOGIC RH(D): CPT

## 2019-02-01 PROCEDURE — 0UT94ZZ RESECTION OF UTERUS, PERCUTANEOUS ENDOSCOPIC APPROACH: ICD-10-PCS | Performed by: OBSTETRICS & GYNECOLOGY

## 2019-02-01 PROCEDURE — 71000001 HC PACU PHASE 1 INITIAL 30MIN: Performed by: OBSTETRICS & GYNECOLOGY

## 2019-02-01 PROCEDURE — 12000000 HC ROOM AND CARE MED/SURG

## 2019-02-01 PROCEDURE — 36415 COLL VENOUS BLD VENIPUNCTURE: CPT | Performed by: OBSTETRICS & GYNECOLOGY

## 2019-02-01 PROCEDURE — 37000001 HC ANESTHESIA GENERAL: Performed by: OBSTETRICS & GYNECOLOGY

## 2019-02-01 PROCEDURE — 25800000 HC PHARMACY IV SOLUTIONS: Performed by: NURSE ANESTHETIST, CERTIFIED REGISTERED

## 2019-02-01 RX ORDER — HYDROMORPHONE HYDROCHLORIDE 2 MG/ML
0.5 INJECTION, SOLUTION INTRAMUSCULAR; INTRAVENOUS; SUBCUTANEOUS
Status: DISCONTINUED | OUTPATIENT
Start: 2019-02-01 | End: 2019-02-01 | Stop reason: HOSPADM

## 2019-02-01 RX ORDER — BISACODYL 10 MG/1
10 SUPPOSITORY RECTAL DAILY PRN
Status: DISCONTINUED | OUTPATIENT
Start: 2019-02-01 | End: 2019-02-02 | Stop reason: HOSPADM

## 2019-02-01 RX ORDER — MIDAZOLAM HYDROCHLORIDE 2 MG/2ML
INJECTION, SOLUTION INTRAMUSCULAR; INTRAVENOUS AS NEEDED
Status: DISCONTINUED | OUTPATIENT
Start: 2019-02-01 | End: 2019-02-01 | Stop reason: SURG

## 2019-02-01 RX ORDER — DIPHENHYDRAMINE HCL 25 MG
25 CAPSULE ORAL EVERY 6 HOURS PRN
Status: DISCONTINUED | OUTPATIENT
Start: 2019-02-01 | End: 2019-02-02 | Stop reason: HOSPADM

## 2019-02-01 RX ORDER — EPHEDRINE SULFATE 50 MG/ML
INJECTION, SOLUTION INTRAVENOUS AS NEEDED
Status: DISCONTINUED | OUTPATIENT
Start: 2019-02-01 | End: 2019-02-01 | Stop reason: SURG

## 2019-02-01 RX ORDER — SODIUM CHLORIDE 9 MG/ML
INJECTION, SOLUTION INTRAVENOUS CONTINUOUS PRN
Status: DISCONTINUED | OUTPATIENT
Start: 2019-02-01 | End: 2019-02-01 | Stop reason: SURG

## 2019-02-01 RX ORDER — IBUPROFEN 200 MG
16-32 TABLET ORAL AS NEEDED
Status: DISCONTINUED | OUTPATIENT
Start: 2019-02-01 | End: 2019-02-01 | Stop reason: HOSPADM

## 2019-02-01 RX ORDER — DEXTROSE 40 %
15-30 GEL (GRAM) ORAL AS NEEDED
Status: DISCONTINUED | OUTPATIENT
Start: 2019-02-01 | End: 2019-02-02 | Stop reason: HOSPADM

## 2019-02-01 RX ORDER — DEXTROSE 40 %
15-30 GEL (GRAM) ORAL AS NEEDED
Status: DISCONTINUED | OUTPATIENT
Start: 2019-02-01 | End: 2019-02-01 | Stop reason: HOSPADM

## 2019-02-01 RX ORDER — IBUPROFEN 200 MG
16-32 TABLET ORAL AS NEEDED
Status: DISCONTINUED | OUTPATIENT
Start: 2019-02-01 | End: 2019-02-02 | Stop reason: HOSPADM

## 2019-02-01 RX ORDER — FENTANYL CITRATE 50 UG/ML
INJECTION, SOLUTION INTRAMUSCULAR; INTRAVENOUS AS NEEDED
Status: DISCONTINUED | OUTPATIENT
Start: 2019-02-01 | End: 2019-02-01 | Stop reason: SURG

## 2019-02-01 RX ORDER — ONDANSETRON HYDROCHLORIDE 2 MG/ML
4 INJECTION, SOLUTION INTRAVENOUS EVERY 8 HOURS PRN
Status: DISCONTINUED | OUTPATIENT
Start: 2019-02-01 | End: 2019-02-02 | Stop reason: HOSPADM

## 2019-02-01 RX ORDER — DEXTROSE 50 % IN WATER (D50W) INTRAVENOUS SYRINGE
25 AS NEEDED
Status: DISCONTINUED | OUTPATIENT
Start: 2019-02-01 | End: 2019-02-01 | Stop reason: HOSPADM

## 2019-02-01 RX ORDER — LIDOCAINE HYDROCHLORIDE 10 MG/ML
INJECTION, SOLUTION INFILTRATION; PERINEURAL AS NEEDED
Status: DISCONTINUED | OUTPATIENT
Start: 2019-02-01 | End: 2019-02-01 | Stop reason: SURG

## 2019-02-01 RX ORDER — MORPHINE SULFATE 2 MG/ML
1-2 INJECTION, SOLUTION INTRAMUSCULAR; INTRAVENOUS
Status: DISCONTINUED | OUTPATIENT
Start: 2019-02-01 | End: 2019-02-02 | Stop reason: HOSPADM

## 2019-02-01 RX ORDER — ONDANSETRON HYDROCHLORIDE 2 MG/ML
INJECTION, SOLUTION INTRAVENOUS AS NEEDED
Status: DISCONTINUED | OUTPATIENT
Start: 2019-02-01 | End: 2019-02-01 | Stop reason: SURG

## 2019-02-01 RX ORDER — DEXAMETHASONE SODIUM PHOSPHATE 4 MG/ML
INJECTION, SOLUTION INTRA-ARTICULAR; INTRALESIONAL; INTRAMUSCULAR; INTRAVENOUS; SOFT TISSUE AS NEEDED
Status: DISCONTINUED | OUTPATIENT
Start: 2019-02-01 | End: 2019-02-01 | Stop reason: SURG

## 2019-02-01 RX ORDER — PROPOFOL 10 MG/ML
INJECTION, EMULSION INTRAVENOUS AS NEEDED
Status: DISCONTINUED | OUTPATIENT
Start: 2019-02-01 | End: 2019-02-01 | Stop reason: SURG

## 2019-02-01 RX ORDER — PHENYLEPHRINE HYDROCHLORIDE 10 MG/ML
INJECTION INTRAVENOUS AS NEEDED
Status: DISCONTINUED | OUTPATIENT
Start: 2019-02-01 | End: 2019-02-01 | Stop reason: SURG

## 2019-02-01 RX ORDER — ACETAMINOPHEN 650 MG/1
650 SUPPOSITORY RECTAL
Status: DISCONTINUED | OUTPATIENT
Start: 2019-02-01 | End: 2019-02-01

## 2019-02-01 RX ORDER — AMOXICILLIN 250 MG
1 CAPSULE ORAL 2 TIMES DAILY
Status: DISCONTINUED | OUTPATIENT
Start: 2019-02-01 | End: 2019-02-02 | Stop reason: HOSPADM

## 2019-02-01 RX ORDER — DIPHENHYDRAMINE HCL 50 MG/ML
25 VIAL (ML) INJECTION EVERY 6 HOURS PRN
Status: DISCONTINUED | OUTPATIENT
Start: 2019-02-01 | End: 2019-02-02 | Stop reason: HOSPADM

## 2019-02-01 RX ORDER — ACETAMINOPHEN 325 MG/1
650 TABLET ORAL ONCE
Status: COMPLETED | OUTPATIENT
Start: 2019-02-01 | End: 2019-02-01

## 2019-02-01 RX ORDER — DEXTROSE 50 % IN WATER (D50W) INTRAVENOUS SYRINGE
25 AS NEEDED
Status: DISCONTINUED | OUTPATIENT
Start: 2019-02-01 | End: 2019-02-02 | Stop reason: HOSPADM

## 2019-02-01 RX ORDER — GLYCOPYRROLATE 0.6MG/3ML
SYRINGE (ML) INTRAVENOUS AS NEEDED
Status: DISCONTINUED | OUTPATIENT
Start: 2019-02-01 | End: 2019-02-01 | Stop reason: SURG

## 2019-02-01 RX ORDER — FENTANYL CITRATE 50 UG/ML
50 INJECTION, SOLUTION INTRAMUSCULAR; INTRAVENOUS
Status: DISCONTINUED | OUTPATIENT
Start: 2019-02-01 | End: 2019-02-01 | Stop reason: HOSPADM

## 2019-02-01 RX ORDER — ALUMINUM HYDROXIDE, MAGNESIUM HYDROXIDE, AND SIMETHICONE 1200; 120; 1200 MG/30ML; MG/30ML; MG/30ML
30 SUSPENSION ORAL EVERY 4 HOURS PRN
Status: DISCONTINUED | OUTPATIENT
Start: 2019-02-01 | End: 2019-02-02 | Stop reason: HOSPADM

## 2019-02-01 RX ORDER — NEOSTIGMINE METHYLSULFATE 1 MG/ML
INJECTION INTRAVENOUS AS NEEDED
Status: DISCONTINUED | OUTPATIENT
Start: 2019-02-01 | End: 2019-02-01 | Stop reason: SURG

## 2019-02-01 RX ORDER — KETOROLAC TROMETHAMINE 30 MG/ML
15 INJECTION, SOLUTION INTRAMUSCULAR; INTRAVENOUS
Status: DISCONTINUED | OUTPATIENT
Start: 2019-02-01 | End: 2019-02-02

## 2019-02-01 RX ORDER — OXYCODONE HYDROCHLORIDE 5 MG/1
5-10 TABLET ORAL EVERY 4 HOURS PRN
Status: DISCONTINUED | OUTPATIENT
Start: 2019-02-01 | End: 2019-02-02 | Stop reason: HOSPADM

## 2019-02-01 RX ORDER — GABAPENTIN 300 MG/1
600 CAPSULE ORAL
Status: COMPLETED | OUTPATIENT
Start: 2019-02-01 | End: 2019-02-01

## 2019-02-01 RX ORDER — SODIUM CHLORIDE, SODIUM GLUCONATE, SODIUM ACETATE, POTASSIUM CHLORIDE AND MAGNESIUM CHLORIDE 30; 37; 368; 526; 502 MG/100ML; MG/100ML; MG/100ML; MG/100ML; MG/100ML
INJECTION, SOLUTION INTRAVENOUS CONTINUOUS PRN
Status: DISCONTINUED | OUTPATIENT
Start: 2019-02-01 | End: 2019-02-01 | Stop reason: SURG

## 2019-02-01 RX ORDER — ACETAMINOPHEN 325 MG/1
975 TABLET ORAL
Status: DISCONTINUED | OUTPATIENT
Start: 2019-02-01 | End: 2019-02-02 | Stop reason: HOSPADM

## 2019-02-01 RX ORDER — SODIUM CHLORIDE, SODIUM LACTATE, POTASSIUM CHLORIDE, CALCIUM CHLORIDE 600; 310; 30; 20 MG/100ML; MG/100ML; MG/100ML; MG/100ML
INJECTION, SOLUTION INTRAVENOUS CONTINUOUS
Status: DISCONTINUED | OUTPATIENT
Start: 2019-02-01 | End: 2019-02-01 | Stop reason: HOSPADM

## 2019-02-01 RX ORDER — ROCURONIUM BROMIDE 10 MG/ML
INJECTION, SOLUTION INTRAVENOUS AS NEEDED
Status: DISCONTINUED | OUTPATIENT
Start: 2019-02-01 | End: 2019-02-01 | Stop reason: SURG

## 2019-02-01 RX ORDER — CELECOXIB 200 MG/1
400 CAPSULE ORAL
Status: COMPLETED | OUTPATIENT
Start: 2019-02-01 | End: 2019-02-01

## 2019-02-01 RX ORDER — ONDANSETRON HYDROCHLORIDE 2 MG/ML
4 INJECTION, SOLUTION INTRAVENOUS
Status: DISCONTINUED | OUTPATIENT
Start: 2019-02-01 | End: 2019-02-01 | Stop reason: HOSPADM

## 2019-02-01 RX ORDER — SODIUM CHLORIDE, SODIUM LACTATE, POTASSIUM CHLORIDE, CALCIUM CHLORIDE 600; 310; 30; 20 MG/100ML; MG/100ML; MG/100ML; MG/100ML
60 INJECTION, SOLUTION INTRAVENOUS CONTINUOUS
Status: DISCONTINUED | OUTPATIENT
Start: 2019-02-01 | End: 2019-02-02

## 2019-02-01 RX ORDER — HYDROMORPHONE HYDROCHLORIDE 1 MG/ML
INJECTION, SOLUTION INTRAMUSCULAR; INTRAVENOUS; SUBCUTANEOUS AS NEEDED
Status: DISCONTINUED | OUTPATIENT
Start: 2019-02-01 | End: 2019-02-01 | Stop reason: SURG

## 2019-02-01 RX ORDER — FAMOTIDINE 20 MG/1
20 TABLET, FILM COATED ORAL EVERY 12 HOURS
Status: DISCONTINUED | OUTPATIENT
Start: 2019-02-01 | End: 2019-02-02 | Stop reason: HOSPADM

## 2019-02-01 RX ORDER — ENOXAPARIN SODIUM 100 MG/ML
40 INJECTION SUBCUTANEOUS
Status: DISCONTINUED | OUTPATIENT
Start: 2019-02-02 | End: 2019-02-02 | Stop reason: HOSPADM

## 2019-02-01 RX ADMIN — ACETAMINOPHEN 975 MG: 325 TABLET ORAL at 20:06

## 2019-02-01 RX ADMIN — MIDAZOLAM HYDROCHLORIDE 2 MG: 1 INJECTION, SOLUTION INTRAMUSCULAR; INTRAVENOUS at 12:03

## 2019-02-01 RX ADMIN — PROPOFOL 170 MG: 10 INJECTION, EMULSION INTRAVENOUS at 12:14

## 2019-02-01 RX ADMIN — SODIUM CHLORIDE: 9 INJECTION, SOLUTION INTRAVENOUS at 12:18

## 2019-02-01 RX ADMIN — ROCURONIUM BROMIDE 30 MG: 10 INJECTION, SOLUTION INTRAVENOUS at 12:18

## 2019-02-01 RX ADMIN — Medication 2 G: at 12:10

## 2019-02-01 RX ADMIN — EPHEDRINE SULFATE 5 MG: 50 INJECTION INTRAVENOUS at 12:35

## 2019-02-01 RX ADMIN — Medication 100 MG: at 12:15

## 2019-02-01 RX ADMIN — CELECOXIB 400 MG: 200 CAPSULE ORAL at 11:10

## 2019-02-01 RX ADMIN — ROCURONIUM BROMIDE 10 MG: 10 INJECTION, SOLUTION INTRAVENOUS at 15:07

## 2019-02-01 RX ADMIN — FENTANYL CITRATE 100 MCG: 50 INJECTION, SOLUTION INTRAMUSCULAR; INTRAVENOUS at 12:14

## 2019-02-01 RX ADMIN — EPHEDRINE SULFATE 5 MG: 50 INJECTION INTRAVENOUS at 12:43

## 2019-02-01 RX ADMIN — Medication 0.5 MG: at 13:16

## 2019-02-01 RX ADMIN — DEXAMETHASONE SODIUM PHOSPHATE 8 MG: 4 INJECTION, SOLUTION INTRA-ARTICULAR; INTRALESIONAL; INTRAMUSCULAR; INTRAVENOUS; SOFT TISSUE at 12:31

## 2019-02-01 RX ADMIN — SODIUM CHLORIDE, POTASSIUM CHLORIDE, SODIUM LACTATE AND CALCIUM CHLORIDE: 600; 310; 30; 20 INJECTION, SOLUTION INTRAVENOUS at 11:11

## 2019-02-01 RX ADMIN — ONDANSETRON 4 MG: 2 INJECTION INTRAMUSCULAR; INTRAVENOUS at 12:31

## 2019-02-01 RX ADMIN — Medication 0.5 MG: at 13:51

## 2019-02-01 RX ADMIN — ONDANSETRON 4 MG: 2 INJECTION INTRAMUSCULAR; INTRAVENOUS at 17:12

## 2019-02-01 RX ADMIN — KETOROLAC TROMETHAMINE 15 MG: 30 INJECTION, SOLUTION INTRAMUSCULAR at 22:44

## 2019-02-01 RX ADMIN — GABAPENTIN 600 MG: 300 CAPSULE ORAL at 11:11

## 2019-02-01 RX ADMIN — Medication 1 G: at 16:10

## 2019-02-01 RX ADMIN — ONDANSETRON HYDROCHLORIDE 4 MG: 2 INJECTION, SOLUTION INTRAMUSCULAR; INTRAVENOUS at 19:12

## 2019-02-01 RX ADMIN — Medication 0.5 MG: at 16:49

## 2019-02-01 RX ADMIN — ROCURONIUM BROMIDE 20 MG: 10 INJECTION, SOLUTION INTRAVENOUS at 13:41

## 2019-02-01 RX ADMIN — ROCURONIUM BROMIDE 10 MG: 10 INJECTION, SOLUTION INTRAVENOUS at 14:34

## 2019-02-01 RX ADMIN — PHENYLEPHRINE HYDROCHLORIDE 100 MCG: 10 INJECTION INTRAVENOUS at 12:23

## 2019-02-01 RX ADMIN — SODIUM CHLORIDE, POTASSIUM CHLORIDE, SODIUM LACTATE AND CALCIUM CHLORIDE 60 ML/HR: 600; 310; 30; 20 INJECTION, SOLUTION INTRAVENOUS at 20:07

## 2019-02-01 RX ADMIN — ROCURONIUM BROMIDE 10 MG: 10 INJECTION, SOLUTION INTRAVENOUS at 16:35

## 2019-02-01 RX ADMIN — LIDOCAINE HYDROCHLORIDE 5 ML: 10 INJECTION, SOLUTION INFILTRATION; PERINEURAL at 12:14

## 2019-02-01 RX ADMIN — ROCURONIUM BROMIDE 20 MG: 10 INJECTION, SOLUTION INTRAVENOUS at 12:49

## 2019-02-01 RX ADMIN — Medication 3 MG: at 17:12

## 2019-02-01 RX ADMIN — Medication 0.5 MG: at 15:21

## 2019-02-01 RX ADMIN — SODIUM CHLORIDE, SODIUM GLUCONATE, SODIUM ACETATE, POTASSIUM CHLORIDE AND MAGNESIUM CHLORIDE: 526; 502; 368; 37; 30 INJECTION, SOLUTION INTRAVENOUS at 13:06

## 2019-02-01 RX ADMIN — SENNOSIDES AND DOCUSATE SODIUM 1 TABLET: 8.6; 5 TABLET ORAL at 20:06

## 2019-02-01 RX ADMIN — EPHEDRINE SULFATE 5 MG: 50 INJECTION INTRAVENOUS at 12:29

## 2019-02-01 RX ADMIN — PHENYLEPHRINE HYDROCHLORIDE 50 MCG: 10 INJECTION INTRAVENOUS at 16:30

## 2019-02-01 RX ADMIN — Medication 0.5 MG: at 17:12

## 2019-02-01 RX ADMIN — FAMOTIDINE 20 MG: 20 TABLET, FILM COATED ORAL at 20:06

## 2019-02-01 RX ADMIN — ACETAMINOPHEN 650 MG: 325 TABLET ORAL at 11:10

## 2019-02-01 RX ADMIN — ROCURONIUM BROMIDE 10 MG: 10 INJECTION, SOLUTION INTRAVENOUS at 15:44

## 2019-02-01 ASSESSMENT — COGNITIVE AND FUNCTIONAL STATUS - GENERAL
MOVING TO AND FROM BED TO CHAIR: 4 - NONE
EATING MEALS: 4 - NONE
STANDING UP FROM CHAIR USING ARMS: 4 - NONE
HELP NEEDED FOR BATHING: 4 - NONE
DRESSING REGULAR UPPER BODY CLOTHING: 4 - NONE
CLIMB 3 TO 5 STEPS WITH RAILING: 4 - NONE
TOILETING: 4 - NONE
DRESSING REGULAR LOWER BODY CLOTHING: 4 - NONE
WALKING IN HOSPITAL ROOM: 4 - NONE
HELP NEEDED FOR PERSONAL GROOMING: 4 - NONE

## 2019-02-01 NOTE — OP NOTE
Date of Procedure:  Indications for Procedure: ASCUS, HPV positive pap with inadequate LEEP  Procedure: Total Laparoscopic Hysterectomy, Bilateral salpingectomy, Cystoscopy  Surgeon: Aimee Ballard MD  Assist: Suze Bermudez MD  Anesthesia: GETA  EBL: 50mL  Specimen: uterus, cervix, bilateral fallopian tubes  Complications: none  Sponge and needle count: correct x2  Intraoperative findings: Dense adhesions between the bladder and lower uterine segment and cervix  Disposition: stable    PROCEDURE:  The surgeon and patient were mutually identified in the preoperative area. Her consents were reconfirmed and her questions were again answered. She was administered preoperative antibiotics with Ancef.     She was taken to the operating room. Sequential compression devices were applied to her lower extremities bilaterally. She was administered general anesthesia. The vagina was prepped with Betadine. She was then prepped and draped in the usual sterile fashion. A timeout was performed.    A Gross catheter was placed sterilely. A bimanual exam under anesthesia revealed a small mobile anteverted uterus. A Amaya speculum and [place] retractor were then placed in the patient's vagina and the anterior lip of the cervix was grasped with a single-tooth tenaculum. The uterus was sounded to 7cm. The cervix was then serially dilated using Rothman dilators to accommodate a medium V-care manipulator, which was advanced through the cervix to the uterine fundus. The tenaculum and Amaya speculum were removed from the vagina. The surgeon's gloves were changed and attention was turned to the abdomen.    A 5 mm incision was made at the base of her umbilicus. Entry into the abdomen was performed via Visiport technique. The abdomen was insufflated with a low opening pressure. Visiport technique was then used for abdominal visualization using a 5mm trocar.  A brief survey revealed no entry injuries. Dense adhesions were noted between the bladder and  the lower uterine segment. Left and Right low quadrants 5mm ports were then placed under direct visualization.     She was then placed in Trendelenburg and the bowel was gently swept out of the pelvis. A survey of the pelvis revealed findings as above. Normal appearing ovaries and fallopian tubes.     The right fallopian tube was elevated and the underlying mesosalpinx was dissected off with the Harmonic to the level of the uterine cornua where it was transected. It was removed through the 5mm port. The same procedure was carried forth on the left fallopian tube.     The ureters were then visualized peristalsing transperitoneally bilaterally.     Attention was turned to the lower uterine segment bladder adhesions. These were carefully dissected using the Harmonic as well as blunt dissection. The bladder was backfilled with sterile water to better delineate the dissection planes. The bladder was drained and the dissection was completed until the bladder was fully  from the lower uterine segment.     The left round ligament was divided. The left utero-ovarian anastamosis was then cauterized using the Harmonic scalpel. We then dissected down the anterior and posterior leaves of the broad ligament. The same procedure was then carried forth on the right. The bladder was back filled again to ensure its complete separation from the lower uterine segment. The bladder was drained. Both ureters were again visualized vermiculating and noted to be well away from the operative area. The uterine arteries were skeletonized and cauterized using the Harmonic.     The Harmonic was then used to dissect the cervical attachment to the vaginal cuff using the VCare cup as a guide in a circumferential manner until the entire specimen was detached from the vagina.  The specimen was then removed from the patient's vagina.    We then turned our attention to the vagina. The angles of the cuff were grasped with Allis clamps. The cuff  was then closed in a horizontal running fashion using 0 Vicryl suture, with care taken to incorporate posterior peritoneum into the closure.  Hemostasis was achieved.     The Gross catheter was removed and the bladder was filled with saline and a cystoscopy was carried out. The bladder was intact with no obvious injuries and both ureteral orifices were identified and seen actively expressing urine and strong jets bilaterally. The Gross was replaced.     Survey of the pelvis was then carried out and Nezhat suction  was used to clean out the pelvic cavity and hemostasis of the cuff was apparent.  The ureters, which had been identified at the beginning of the procedure, were again visualized and noted to be peristalsing freely well below the area of dissection. Some bladder deserosalization was noted. Floseal was applied to the bladder and vaginal cuff.     The abdomen was the desufflated and the trocars removed. The skin was then reapproximated with a combination of 4-0 Monocryl suture and Dermabond. The vaginal cuff was again inspected and was found to be hemostatic, and the procedure was considered completed.  All instruments were removed and counts were correct x2. She was extubated and taken to the recovery room in stable condition.    Dr. Ballard was present and performed the entire procedure.     Suze Bermudez MD

## 2019-02-01 NOTE — BRIEF OP NOTE
HYSTERECTOMY ABDOMINAL TOTAL LAPAROSCOPIC Procedure Note    Procedures:    * HYSTERECTOMY ABDOMINAL TOTAL LAPAROSCOPIC    Pre-op Diagnosis     * Abnormal vaginal Pap smear [R87.629]       Post-op Diagnosis     * Abnormal vaginal Pap smear [R87.629]    Surgeon(s) and Role:     * Aimee Ballard MD - Primary     * Suze Bermudez MD - Resident - Assisting    Anesthesia: General    Staff:   Circulator: Suze Shelton RN; Kaci Mane RN; Kayla Bocanegra RN  Scrub Person: Marco Antonio Shipley RN; Kaci Mane RN    Procedure Details   See operative note    Estimated Blood Loss: 50 ml    Specimens:                Uterus and cervix  Right fallopian tube  Left fallopian tube      Drains:   NG/OG Tube 02/01/19 Center mouth (Active)       Urethral Catheter Non-latex 16 Fr (Active)   Reason for Continuing Urinary Catheterization past POD 1 Post surgical procedure, within 24 hours 2/1/2019  6:00 PM   Urine Characteristics clear;other (see comments) 2/1/2019  6:00 PM   Securement Method Securing Device 2/1/2019  6:00 PM              Complications:  None; patient tolerated the procedure well.           Disposition: PACU - hemodynamically stable.           Condition: stable    Aimee Ballard MD  Phone Number: [phone]

## 2019-02-01 NOTE — H&P (VIEW-ONLY)
Patient ID: Giovanna Noble   : 1960 58 y.o.  MRN: [id number]   Visit Date: 2019    Subjective   Giovanna Noble is presenting today for Pre-op Visit      HPI    Patient presents for pre-op visit. She will be undergoing an LAVH, BS, and Cysto on 19.     She is a 57yo  who had an ASCUS/+HR HPV pap with her PCP. I attempted a colposcopy in the office which was not satisfactory as the transformation zone was not visualized. As a result of cervical stenosis I was also not able to collect an ECC. Decision was made to proceed with a LEEP procedure to more adequately assess the endocervical canal.     LEEP with tophat was performed on 10/5/18. Pathology from the procedure showed denuded fibromuscular stroma with scant squamous mucosa present and no cervical tissue present for ECC. No transformation zone present. Patient was given option of repeat LEEP, however she did not want to undergo another procedure when this might result in hysterectomy anyway. She just wanted to proceed with hysterectomy.     I discussed the case with our gyn/onc who said the potential for underlying malignancy with an ASCUS pap is low and she felt fine with me proceeding with the hysterectomy despite not having evaluation of the transformation zone.       Past Medical History:  has a past medical history of Abnormal Pap smear of cervix; Arthritis; Breast cancer screening (2018); Cardiac dysrhythmia; Colitis, ulcerative (CMS/HCC) (HCC); Depression; Endocrine disorder (); History of varicella; Lyme disease; Keratoconus, stable; Osteopenia (2018); Pap smear for cervical cancer screening (2018); and Rosacea. She also has no past medical history of Acne or Asthma.     Past Surgical History:  has a past surgical history that includes  section; Fracture surgery; Fracture surgery; and Hip surgery (2018).    Obstetric History:   OB History    Para Term  AB Living   2 2 2     2   SAB TAB  "Ectopic Multiple Live Births           2      # Outcome Date GA Lbr Emeterio/2nd Weight Sex Delivery Anes PTL Lv   2 Term 02/08/06 38w0d   F CS-Unspec   LILIAN   1 Term 2005 40w0d   M CS-Unspec   LILIAN          Medications: No current outpatient prescriptions on file.      Allergies: has No Known Allergies.       Vital Signs for this encounter: /76 (BP Location: Right upper arm, Patient Position: Sitting)   Ht 1.702 m (5' 7\")   Wt 59.4 kg (131 lb)   LMP  (LMP Unknown)   BMI 20.52 kg/m²     OBGyn Exam  General Appearance: Alert, cooperative, no acute distress  Head: Normocephalic, without obvious abnormality  Respiratory: CTAB  Cardiovascular: RRR  Abdomen: Soft, nontender, nondistended,no masses, no organomegaly  Genitalia: deferred  Extremities: no edema      Impression/Plan:    58 y.o. is presenting today for Pre-op Visit      1. Patient is undergoing a LAVH, BS, Cysto  for Cervical Dysplasia.   2.Procedure was explained, risks and benefits were discussed, all questions answered.   3.Written consent was obtained.   4. Patient preop labs were drawn in the office today. She was sent for a preoperative EKG.   5. Hospital stay and recovery expectations reviewed.   6. Patient was counseled on the need for further surgery and gynon consult should the pathology result in an underlying malignancy.      Aimee Ballard MD  "

## 2019-02-01 NOTE — PROGRESS NOTES
Gynecology Post-operative Note    Subjective     Pt well post operatively. She reports her pain is controlled. She has tolerated water and crackers since surgery. Her  is bringing her some soup now. Her Gross and vaginal pack remain in place.     Pt denies headaches, dizziness, fevers, chills, shortness of breath, chest pain, nausea and vomiting.     Objective     Vital signs in last 24 hours:  Temp:  [36.4 °C (97.6 °F)-36.9 °C (98.5 °F)] 36.5 °C (97.7 °F)  Heart Rate:  [59-86] 75  Resp:  [9-18] 16  BP: (104-120)/(49-67) 108/59    Intake/Output Summary (Last 24 hours) at 02/01/19 2157  Last data filed at 02/01/19 1900   Gross per 24 hour   Intake             2200 ml   Output             1100 ml   Net             1100 ml     Intake/Output this shift:  I/O this shift:  In: 100 [I.V.:100]  Out: 200 [Urine:200]    Exam  General Appearance: Alert, cooperative, no acute distress  Head: Normocephalic, without obvious abnormality, atraumatic  Lungs: Clear to auscultation bilaterally, respirations unlabored  Heart: Regular rate and rhythm, S1 and S2 normal, no murmur, rub or gallop  Abdomen: Soft, appropriately tender, nondistended, bowel sounds active, no masses, incisions are clean/dry/intact with dermabond  Genitalia: vaginal packing in place  Rectal: Deferred  Extremities: no edema or calf tenderness  Musculoskeletal: No injury or deformity  Skin: Skin color, texture, turgor normal, no rashes or lesions  Neurologic: Grossly intact    Labs  AM CBC/BMP ordered      Assessment/Plan   58 y.o. POD# TLH, BS, lysis of adhesions, cysto 2/2 abnormal pap and failed LEEP    1. Post operative care  - AFVSS with adequate urine output.   - Continue pain control per ERAS protocol.    - Hgb 13.3 pre op. No signs/symptoms of anemia currently. CBC ordered for AM.   - Continue VTE ppx with SCDs while in bed lovenox in AM  - Continue routine post-op care.     Discussed with Dr. Nellie Bermudez MD

## 2019-02-01 NOTE — OR SURGEON
Patient identified. Name, Date of Birth, and Allergies all confirmed. Procedure discussed again, risks/benefits reviewed, all questions answered. Consents reconfirmed.     Aimee Ballard MD

## 2019-02-01 NOTE — ANESTHESIA PROCEDURE NOTES
Airway  Urgency: elective    Start Time: 2/1/2019 12:16 PM  Difficult airway    General Information and Staff    Patient location during procedure: OR  Anesthesiologist: MEGAN GUSMAN  Resident/CRNA: LA EDWARD  Performed: resident/CRNA     Indications and Patient Condition  Indications for airway management: anesthesia  Sedation level: deep  Preoxygenated: yes  Patient position: sniffing  MILS maintained throughout  Mask difficulty assessment: 1 - vent by mask    Final Airway Details  Final airway type: endotracheal airway      Successful airway: ETT  Cuffed: yes   Successful intubation technique: direct laryngoscopy  Facilitating devices/methods: intubating stylet  Endotracheal tube insertion site: oral  Blade: Ariel  Blade size: #3  ETT size: 7.0 mm  Cormack-Lehane Classification: grade IIa - partial view of glottis  Placement verified by: chest auscultation and capnometry   Measured from: lips  ETT to lips (cm): 23  Number of attempts at approach: 1  Number of other approaches attempted: 0  Atraumatic airway insertion

## 2019-02-02 VITALS
SYSTOLIC BLOOD PRESSURE: 102 MMHG | RESPIRATION RATE: 18 BRPM | TEMPERATURE: 99 F | DIASTOLIC BLOOD PRESSURE: 54 MMHG | HEIGHT: 67 IN | BODY MASS INDEX: 20.55 KG/M2 | WEIGHT: 130.95 LBS | HEART RATE: 66 BPM | OXYGEN SATURATION: 98 %

## 2019-02-02 LAB
ANION GAP SERPL CALC-SCNC: 8 MEQ/L (ref 3–15)
ANION GAP SERPL CALC-SCNC: 8 MEQ/L (ref 3–15)
BUN SERPL-MCNC: 11 MG/DL (ref 8–20)
BUN SERPL-MCNC: 12 MG/DL (ref 8–20)
CALCIUM SERPL-MCNC: 9 MG/DL (ref 8.9–10.3)
CALCIUM SERPL-MCNC: 9.4 MG/DL (ref 8.9–10.3)
CHLORIDE SERPL-SCNC: 108 MEQ/L (ref 98–109)
CHLORIDE SERPL-SCNC: 108 MEQ/L (ref 98–109)
CO2 SERPL-SCNC: 25 MEQ/L (ref 22–32)
CO2 SERPL-SCNC: 26 MEQ/L (ref 22–32)
CREAT SERPL-MCNC: 0.7 MG/DL
CREAT SERPL-MCNC: 0.7 MG/DL
ERYTHROCYTE [DISTWIDTH] IN BLOOD BY AUTOMATED COUNT: 12.4 % (ref 11.7–14.4)
GFR SERPL CREATININE-BSD FRML MDRD: >60 ML/MIN/1.73M*2
GFR SERPL CREATININE-BSD FRML MDRD: >60 ML/MIN/1.73M*2
GLUCOSE SERPL-MCNC: 130 MG/DL (ref 70–99)
GLUCOSE SERPL-MCNC: 97 MG/DL (ref 70–99)
HCT VFR BLDCO AUTO: 36.2 %
HGB BLD-MCNC: 11.8 G/DL
MCH RBC QN AUTO: 30.5 PG (ref 28–33.2)
MCHC RBC AUTO-ENTMCNC: 32.6 G/DL (ref 32.2–35.5)
MCV RBC AUTO: 93.5 FL (ref 83–98)
PDW BLD AUTO: 11.4 FL (ref 9.4–12.3)
PLATELET # BLD AUTO: 204 K/UL
POTASSIUM SERPL-SCNC: 4 MEQ/L (ref 3.6–5.1)
POTASSIUM SERPL-SCNC: 5.2 MEQ/L (ref 3.6–5.1)
RBC # BLD AUTO: 3.87 M/UL (ref 3.93–5.22)
SODIUM SERPL-SCNC: 141 MEQ/L (ref 136–144)
SODIUM SERPL-SCNC: 142 MEQ/L (ref 136–144)
WBC # BLD AUTO: 13.44 K/UL

## 2019-02-02 PROCEDURE — 80048 BASIC METABOLIC PNL TOTAL CA: CPT | Performed by: OBSTETRICS & GYNECOLOGY

## 2019-02-02 PROCEDURE — 36415 COLL VENOUS BLD VENIPUNCTURE: CPT | Performed by: OBSTETRICS & GYNECOLOGY

## 2019-02-02 PROCEDURE — 84520 ASSAY OF UREA NITROGEN: CPT | Performed by: OBSTETRICS & GYNECOLOGY

## 2019-02-02 PROCEDURE — 85027 COMPLETE CBC AUTOMATED: CPT | Performed by: OBSTETRICS & GYNECOLOGY

## 2019-02-02 PROCEDURE — 63700000 HC SELF-ADMINISTRABLE DRUG: Performed by: OBSTETRICS & GYNECOLOGY

## 2019-02-02 PROCEDURE — 63600000 HC DRUGS/DETAIL CODE: Performed by: OBSTETRICS & GYNECOLOGY

## 2019-02-02 RX ORDER — ACETAMINOPHEN 325 MG/1
975 TABLET ORAL EVERY 6 HOURS PRN
Start: 2019-02-02 | End: 2019-07-01

## 2019-02-02 RX ORDER — IBUPROFEN 600 MG/1
600 TABLET ORAL
Status: DISCONTINUED | OUTPATIENT
Start: 2019-02-02 | End: 2019-02-02 | Stop reason: HOSPADM

## 2019-02-02 RX ORDER — IBUPROFEN 200 MG
600 TABLET ORAL EVERY 6 HOURS PRN
Start: 2019-02-02 | End: 2021-06-14 | Stop reason: ALTCHOICE

## 2019-02-02 RX ORDER — OXYCODONE HYDROCHLORIDE 5 MG/1
5 TABLET ORAL EVERY 6 HOURS PRN
Qty: 10 TABLET | Refills: 0 | Status: SHIPPED | OUTPATIENT
Start: 2019-02-02 | End: 2019-07-01

## 2019-02-02 RX ADMIN — ACETAMINOPHEN 975 MG: 325 TABLET ORAL at 16:04

## 2019-02-02 RX ADMIN — IBUPROFEN 600 MG: 600 TABLET ORAL at 18:02

## 2019-02-02 RX ADMIN — ACETAMINOPHEN 975 MG: 325 TABLET ORAL at 08:56

## 2019-02-02 RX ADMIN — FAMOTIDINE 20 MG: 20 TABLET, FILM COATED ORAL at 08:56

## 2019-02-02 RX ADMIN — KETOROLAC TROMETHAMINE 15 MG: 30 INJECTION, SOLUTION INTRAMUSCULAR at 05:28

## 2019-02-02 RX ADMIN — IBUPROFEN 600 MG: 600 TABLET ORAL at 12:27

## 2019-02-02 RX ADMIN — ENOXAPARIN SODIUM 40 MG: 100 INJECTION SUBCUTANEOUS at 05:23

## 2019-02-02 NOTE — PLAN OF CARE
Problem: Patient Care Overview  Goal: Plan of Care Review  Outcome: Ongoing (interventions implemented as appropriate)   02/02/19 1413   Coping/Psychosocial   Plan Of Care Reviewed With patient   Plan of Care Review   Progress progress towards functional goals is fair   Outcome Summary Plan of care reviewed. Patient verbalized understanding. Leg bag teaching and rocha care completed.     Goal: Individualization & Mutuality  Outcome: Ongoing (interventions implemented as appropriate)      Problem: Hysterectomy (Adult)  Goal: Signs and Symptoms of Listed Potential Problems Will be Absent, Minimized or Managed (Hysterectomy)  Outcome: Outcome(s) Achieved Date Met: 02/02/19    Goal: Anesthesia/Sedation Recovery  Outcome: Outcome(s) Achieved Date Met: 02/02/19      Problem: Pressure Ulcer Risk (Werner Scale) (Adult,Obstetrics,Pediatric)  Goal: Identify Related Risk Factors and Signs and Symptoms  Outcome: Outcome(s) Achieved Date Met: 02/02/19    Goal: Skin Integrity  Outcome: Ongoing (interventions implemented as appropriate)      Problem: Urinary Tract Infection (Adult)  Goal: Signs and Symptoms of Listed Potential Problems Will be Absent, Minimized or Managed (Urinary Tract Infection)  Outcome: Ongoing (interventions implemented as appropriate)      Problem: Pain, Acute (Adult)  Goal: Identify Related Risk Factors and Signs and Symptoms  Outcome: Outcome(s) Achieved Date Met: 02/02/19    Goal: Acceptable Pain Control/Comfort Level  Outcome: Ongoing (interventions implemented as appropriate)

## 2019-02-02 NOTE — NURSING NOTE
Patient discharged to home. Discharge instructions reviewed- patient verbalized understanding. Leg bag teaching and rocha catheter care completed. Patient demonstrated ability to care for catheter. Patient transported to [facility] via wheel chair in stable condition.

## 2019-02-02 NOTE — DISCHARGE SUMMARY
Inpatient Discharge Summary    BRIEF OVERVIEW  Admitting Provider: Dr. Aimee Ballard      Attending Provider: Aimee Ballard MD Attending phys phone: [phone]  Primary Care Physician at Discharge: Britney Arellano [phone]    Admission Date: 2/1/2019     Discharge Date: 2/2/2019    Primary Discharge Diagnosis  Abnormal vaginal Pap smear    DETAILS OF HOSPITAL STAY    Operative Procedures Performed  Procedure(s):  HYSTERECTOMY ABDOMINAL TOTAL LAPAROSCOPIC      Hospital Course  Pt presented for her scheduled surgery. This was completed - please see operative note for further detail. She met post operative milestones and was discharged home in stable condition on post op day one. She was discharged with a Gross catheter for several days due to extensive bladder dissection during her surgery.         Active Issues Requiring Follow-up  Issue: Gross catheter removal and trial of void  What is Needed: Follow up with Dr. Ballard on Monday, February 4      Discharge Disposition  Home   Code Status at Discharge: Full Code

## 2019-02-02 NOTE — PLAN OF CARE
Problem: Patient Care Overview  Goal: Plan of Care Review  Outcome: Ongoing (interventions implemented as appropriate)   02/02/19 0609   Coping/Psychosocial   Plan Of Care Reviewed With patient   Plan of Care Review   Progress improving   Outcome Summary Pt did well overnight and pain was well under controlled.       Problem: Hysterectomy (Adult)  Goal: Signs and Symptoms of Listed Potential Problems Will be Absent, Minimized or Managed (Hysterectomy)  Outcome: Ongoing (interventions implemented as appropriate)    Goal: Anesthesia/Sedation Recovery  Outcome: Ongoing (interventions implemented as appropriate)

## 2019-02-02 NOTE — ANESTHESIA POSTPROCEDURE EVALUATION
Patient: Giovanna Noble    Procedure Summary     Date:  02/01/19 Room / Location:  [facility]    Anesthesia Start:  1202 Anesthesia Stop:  1747    Procedure:  HYSTERECTOMY ABDOMINAL TOTAL LAPAROSCOPIC Diagnosis:       Abnormal vaginal Pap smear      (Abnormal vaginal Pap smear [R87.629])    Surgeon:  Aimee Ballard MD Responsible Provider:  Roberth Chery MD    Anesthesia Type:  general ASA Status:  2          Anesthesia Type: general  PACU Vitals  2/1/2019 1734 - 2/1/2019 1834      2/1/2019 1745 2/1/2019 1800 2/1/2019 1815 2/1/2019 1830    BP: 120/61 (!)  116/56 (!)  104/49 112/67    Temp: 36.6 °C (97.9 °F) - - -    Pulse: 84 84 84 74    Resp: 18 (!)  9 17 (!)  10    SpO2: 99 % 100 % 100 % 100 %            Anesthesia Post Evaluation    Pain management: adequate  Patient location during evaluation: PACU  Patient participation: complete - patient participated  Level of consciousness: awake and alert  Cardiovascular status: acceptable  Airway Patency: adequate  Respiratory status: acceptable  Hydration status: acceptable  Anesthetic complications: no

## 2019-02-02 NOTE — DISCHARGE INSTRUCTIONS
Laparoscopic Surgery  Discharge Instructions    What to Expect  It is a myth that patients who have laparoscopic surgery bounce right back after surgery. Please resume activities slowly.    It is normal for the incisions to have some staining/spotting initially. It is also normal for one incision to be slighter larger and sometimes more painful than the rest.    We would like you to continue your alternating doses of two Extra Strength Tylenol (500mg each tablet) and three Ibuprofen (200mg each tablet) when you are discharged home. Both of these medications can be purchased over the counter at most pharmacies and grocery stores. We suggest taking 600mg of Ibuprofen followed by 1000mg of Tylenol three hours later, and then continuing this pattern every three hours. As you become more comfortable you can extend the time duration between doses or stop one of the medications entirely. If you were prescribed narcotic medications we suggest using these only if the above regimen does not adequately control your pain. You can take tramadol 50mg every 4 hours as needed. If this regimen does not control your pain, please call the office so we can re-evaluate your pain medications and get you more comfortable.     Try to drink plenty of fluids. It you become constipated you may try taking a laxative (e.g. Milk of Magnesia, Miralax, Sennakot, Dulcolax).      Activity  Do not lift more than 25lbs. for at least 2 weeks from the time of surgery.    Climbing stairs is okay.    If you have had a hysterectomy (removal of the uterus) no sexual intercourse, tampons or douching until you’re examined at your first post operative exam.    You may resume driving when you feel able to control a vehicle. Do not drive if using prescription pain medications.     You may shower normally. The incisions usually have dissolvable sutures and/or glue at the incision sites. Please keep clean and dry. Just pat the area dry after a shower.    PLEASE  CALL THE OFFICE @ [phone] TO SCHEDULE A FOLLOW-UP EXAM IN 6 WEEKS FROM TIME OF SURGERY.    PLEASE CALL THE OFFICE IF YOU EXPERIENCE ANY OF THE FOLLOWING:  - Heavy bleeding. This is if you are using more than 1 pad per hour.  - Fever greater than 100.4.   - Foul smelling vaginal discharge.   - Constipation not relieved by milk of magnesia.  - Redness at incision or worsening pain or any other concerns.

## 2019-02-02 NOTE — PROGRESS NOTES
Gynecology Progress Note    Subjective     Pt well post operatively. She reports her pain is controlled. She has tolerated water and crackers after surgery yesterday. Her Gross remains in place. She has not yet ambulated. She has not yet passed flatus.     Pt denies headaches, dizziness, fevers, chills, shortness of breath, chest pain, nausea and vomiting.     Objective     Vital signs in last 24 hours:  Temp:  [36.2 °C (97.1 °F)-36.9 °C (98.5 °F)] 36.3 °C (97.3 °F)  Heart Rate:  [59-86] 65  Resp:  [9-18] 16  BP: (102-124)/(49-67) 108/56    Intake/Output Summary (Last 24 hours) at 02/02/19 0619  Last data filed at 02/02/19 0500   Gross per 24 hour   Intake             2200 ml   Output             2000 ml   Net              200 ml     Intake/Output this shift:  I/O this shift:  In: 100 [I.V.:100]  Out: 1100 [Urine:1100]    Exam  General Appearance: Alert, cooperative, no acute distress  Head: Normocephalic, without obvious abnormality, atraumatic  Lungs: Clear to auscultation bilaterally, respirations unlabored  Heart: Regular rate and rhythm, S1 and S2 normal, no murmur, rub or gallop  Abdomen: Soft, appropriately tender, nondistended, bowel sounds active, no masses, incisions are clean/dry/intact with dermabond  Genitalia: vaginal pack removed this morning -very minimally saturated  Rectal: Deferred  Extremities: no edema or calf tenderness  Musculoskeletal: No injury or deformity  Skin: Skin color, texture, turgor normal, no rashes or lesions  Neurologic: Grossly intact    Labs  AM CBC/BMP pending      Assessment/Plan   58 y.o. POD# 1 TLH, BS, lysis of adhesions, cysto 2/2 abnormal pap and failed LEEP    1. Post operative care  - AFVSS with adequate urine output. Gross to remain in place for three days due to extensive bladder adhesions and dissection during surgery. Gross care and leg bag teaching ordered today.   - Continue pain control per ERAS protocol.    - Hgb 13.3 pre op. No signs/symptoms of anemia  currently. CBC pending.   - Continue VTE ppx with SCDs while in bed lovenox this morning.  - Continue routine post-op care. If pt continues to meet post operative milestones, consider discharge later today.     Will discuss continued plan of care with Dr. Nellie Bermudez MD

## 2019-02-02 NOTE — PROGRESS NOTES
R3    Repeat BMP resulted  Na 141 K 4.0 Cr 0.7    Stable for discharge.  D/w Dr. Nellie Topete DO

## 2019-02-04 ENCOUNTER — OFFICE VISIT (OUTPATIENT)
Dept: OBSTETRICS AND GYNECOLOGY | Facility: CLINIC | Age: 59
End: 2019-02-04
Payer: COMMERCIAL

## 2019-02-04 VITALS
SYSTOLIC BLOOD PRESSURE: 114 MMHG | HEIGHT: 67 IN | BODY MASS INDEX: 20.97 KG/M2 | DIASTOLIC BLOOD PRESSURE: 70 MMHG | WEIGHT: 133.6 LBS

## 2019-02-04 DIAGNOSIS — Z09 POSTOP CHECK: Primary | ICD-10-CM

## 2019-02-04 PROCEDURE — 99024 POSTOP FOLLOW-UP VISIT: CPT | Performed by: OBSTETRICS & GYNECOLOGY

## 2019-02-06 LAB
CASE RPRT: NORMAL
CLINICAL INFO: NORMAL
PATH REPORT.FINAL DX SPEC: NORMAL
PATH REPORT.GROSS SPEC: NORMAL

## 2019-02-07 ENCOUNTER — TELEPHONE (OUTPATIENT)
Dept: OBSTETRICS AND GYNECOLOGY | Facility: CLINIC | Age: 59
End: 2019-02-07

## 2019-02-07 NOTE — TELEPHONE ENCOUNTER
Spoke with patient regarding negative pathology from TLH, BS, Cysto. No further treatment necessary. Since no QUINTON 2, patient does not need pap smears in her future. Also asked how she is voiding and she said well. No issues. Will see her in 4 weeks for post-op appointment. CAB

## 2019-02-19 NOTE — PROGRESS NOTES
Patient ID: Giovanna Noble   : 1960 58 y.o.  MRN: [id number]   Visit Date: 2019    Subjective   Giovanna Noble is presenting today for No chief complaint on file.      HPI    Patient is s/p TLH, BS, LORRAINE, Cysto on 19. Patient's surgery was complicated by extensive adhesions between her anterior abdominal wall, the anterior surface of her uterus and her bladder. Cystoscopy was negative for bladder injury and b/l ureteral jets were noted. However, due to the extensive dissection around the bladder decision as made to leave the rocha catheter in place for 72 hours and bring her back to the office for a urogyn tov.     Today patient reports she is feeling well.    Bleeding: Patient is not having bleeding after the procedure.  Pain: Patient's pain is well controlled. She is taking pain medications.   Sexual Activity: has not resumed sexual activity.   Physical Activity: has not resumed physical activity.   Bowl and Bladder: Patient is not having difficult with her bowel or bladder function. She is passing flatus and has had a bowel movement. Rocha is draining clear yellow urine.     Past Medical History:  has a past medical history of Abnormal Pap smear of cervix; Arthritis; Breast cancer screening (2018); Depression; Endocrine disorder (); History of varicella; Keratoconus, stable; Osteopenia (2018); Pap smear for cervical cancer screening (2018); and Rosacea. She also has no past medical history of Acne or Asthma.     Past Surgical History:  has a past surgical history that includes  section; Hip surgery (2018); Fracture surgery; Fracture surgery; and Dilation and curettage, diagnostic / therapeutic.    Obstetric History:   OB History    Para Term  AB Living   2 2 2     2   SAB TAB Ectopic Multiple Live Births           2      # Outcome Date GA Lbr Emeterio/2nd Weight Sex Delivery Anes PTL Lv   2 Term 06 38w0d   F CS-Unspec   LILIAN   1 Term  40w0d   M  "CS-Unspec   LILIAN          Medications:   Current Outpatient Prescriptions:   •  acetaminophen (TYLENOL) 325 mg tablet, Take 3 tablets (975 mg total) by mouth every 6 (six) hours as needed for mild pain., Disp: , Rfl:   •  ibuprofen (MOTRIN) 200 mg tablet, Take 3 tablets (600 mg total) by mouth every 6 (six) hours as needed for mild pain., Disp: , Rfl:       Allergies: has No Known Allergies.       Vital Signs for this encounter: /70 (BP Location: Right upper arm, Patient Position: Sitting)   Ht 1.702 m (5' 7\")   Wt 60.6 kg (133 lb 9.6 oz)   LMP  (LMP Unknown)   BMI 20.92 kg/m²     OBGyn Exam  General Appearance: Alert, cooperative, no acute distress  Head: Normocephalic, without obvious abnormality  Abdomen: Soft, nontender, nondistended,no masses, no organomegaly. Incisions c/d/i. No Si/Sx of infection  Genitalia: No external lesions or masses. Rocha draining clear yellow urine.   Extremities: no edema      Bladder back filled with 300cc of sterile normal saline. Patient voided 400cc of urine approximately 10 minutes after backfill.         Impression/Plan:    1. Passed urogyn TOV. Will keep rocha out. Patient otherwise doing well. Discussed with patient to call office immediately if she has any difficulty voiding, if she does not feel like she is fully emptying, having significant abdominal pain, or any fevers/chills/sweats. Patient agrees. Will return to office in 5 weeks for post-op visit.   "

## 2019-03-13 ENCOUNTER — OFFICE VISIT (OUTPATIENT)
Dept: OBSTETRICS AND GYNECOLOGY | Facility: CLINIC | Age: 59
End: 2019-03-13
Payer: COMMERCIAL

## 2019-03-13 VITALS
BODY MASS INDEX: 20.47 KG/M2 | DIASTOLIC BLOOD PRESSURE: 66 MMHG | HEIGHT: 67 IN | SYSTOLIC BLOOD PRESSURE: 118 MMHG | WEIGHT: 130.4 LBS

## 2019-03-13 DIAGNOSIS — Z09 POSTOP CHECK: Primary | ICD-10-CM

## 2019-03-13 PROCEDURE — 99024 POSTOP FOLLOW-UP VISIT: CPT | Performed by: OBSTETRICS & GYNECOLOGY

## 2019-03-13 NOTE — PROGRESS NOTES
Patient ID: Giovanna Noble   : 1960 58 y.o.  MRN: [id number]   Visit Date: 3/13/2019    Subjective   Giovanna Noble is presenting today for Post-op Visit      HPI    Patient is s/p TLH, BSO, lysis of adhesions, and cystoscopy on 19. There were dense adhesions from the bladder to the cervix. She went home with a rocha for the weekend and came back to office and passed urogyn tov on 19.     Bleeding: Patient is having bleeding after the procedure. Says she will have some daily bleeding.   Pain: Patient's pain is well controlled. She is not taking pain medications.   Sexual Activity: has not resumed sexual activity.   Physical Activity: has not resumed physical activity.   Bowl and Bladder: Patient is not having difficult with her bowel or bladder function.     Past Medical History:  has a past medical history of Abnormal Pap smear of cervix; Arthritis; Breast cancer screening (2018); Depression; Endocrine disorder (); History of varicella; Keratoconus, stable; Osteopenia (2018); Pap smear for cervical cancer screening (2018); and Rosacea. She also has no past medical history of Acne or Asthma.     Past Surgical History:  has a past surgical history that includes  section; Hip surgery (2018); Fracture surgery; Fracture surgery; and Dilation and curettage, diagnostic / therapeutic.    Obstetric History:   OB History    Para Term  AB Living   2 2 2     2   SAB TAB Ectopic Multiple Live Births           2      # Outcome Date GA Lbr Emeterio/2nd Weight Sex Delivery Anes PTL Lv   2 Term 06 38w0d   F CS-Unspec   LILIAN   1 Term  40w0d   M CS-Unspec   LILIAN          Medications:   Current Outpatient Prescriptions:   •  ibuprofen (MOTRIN) 200 mg tablet, Take 3 tablets (600 mg total) by mouth every 6 (six) hours as needed for mild pain., Disp: , Rfl:       Allergies: has No Known Allergies.       Vital Signs for this encounter: /66 (BP Location: Right upper  "arm, Patient Position: Sitting)   Ht 1.702 m (5' 7\")   Wt 59.1 kg (130 lb 6.4 oz)   LMP  (LMP Unknown)   BMI 20.42 kg/m²     OBGyn Exam  General Appearance: Alert, cooperative, no acute distress  Head: Normocephalic, without obvious abnormality  Abdomen: Soft, nontender, nondistended,no masses, no organomegaly. Incisions c/d/i. No Si/Sx of infection  Genitalia: No lesions or masses. Cervix and uterus are surgically absent. No adnexal masses. Scan dark blood noted in vault. There were still some stiches present in the vaginal cuff and this appeared to be the origin of bleeding. Vaginal cuff intact on palpation.   Extremities: no edema      Impression/Plan:    1. Physical exam normal significant for some suture still in vaginal cuff and scan blood. Discussed these findings with patient. Did not clear her for sexual intercourse or pools/baths. Will have her to premarin cream qhs x14 days and then return to office for exam.   2. Patient may return to work and exercise as tolerated. Letter and paperwork completed.   3. Will continue to follow with primary for annual exams. Does not need paps in the future as pathology was < than CIN2  "

## 2019-03-13 NOTE — LETTER
To Whom It May Concern:    Giovanna Noble was seen in our office today for her post operative visit. She may return to work with no restrictions on March 18, 2019.       If you require additional information please contact our office.      Sincerely,          Aimee Ballard MD

## 2019-03-27 ENCOUNTER — OFFICE VISIT (OUTPATIENT)
Dept: OBSTETRICS AND GYNECOLOGY | Facility: CLINIC | Age: 59
End: 2019-03-27
Payer: COMMERCIAL

## 2019-03-27 VITALS
DIASTOLIC BLOOD PRESSURE: 74 MMHG | HEIGHT: 67 IN | SYSTOLIC BLOOD PRESSURE: 122 MMHG | WEIGHT: 130 LBS | BODY MASS INDEX: 20.4 KG/M2

## 2019-03-27 DIAGNOSIS — Z09 POSTOP CHECK: Primary | ICD-10-CM

## 2019-03-27 PROCEDURE — 99024 POSTOP FOLLOW-UP VISIT: CPT | Performed by: OBSTETRICS & GYNECOLOGY

## 2019-03-27 NOTE — PROGRESS NOTES
Patient ID: Giovanna Noble   : 1960 58 y.o.  MRN: [id number]   Visit Date: 3/27/2019    Subjective   Giovanna Noble is presenting today for Wound Check      HPI  No LMP recorded (lmp unknown). Patient is postmenopausal.    Patient presents for follow up visit for vaginal cuff healing. She underwent a TLH, BS, LORRAINE, and Cysto for ASCUS/+HR HPV and inadequate LEEP procedure on 19 . I saw her for a routine postop visit on 3/13/19. At that time she was feeling well but did have some continued light vaginal bleeding every day. Her pain was well controlled and she was meeting all other postop milestones. On Exam there was noted to be dissolving stiches in the vaginal cuff and some dark blood in the vault. The cuff was well approximated. I believed the bleeding was due to dissolving stiches. I asked the patient to do nightly estrogen cream for two weeks and then follow back up with me for repeat exam.     Today she says she continues to do well. She is no longer having any bleeding. Is is having no pain. She is urinating normally and having normal bowel movements. She has not resumed sexual intercourse per my instructions. She has returned to work without difficulty.     Past Medical History:  has a past medical history of Abnormal Pap smear of cervix; Arthritis; Breast cancer screening (2018); Depression; Endocrine disorder (); History of varicella; Keratoconus, stable; Osteopenia (2018); Pap smear for cervical cancer screening (2018); and Rosacea. She also has no past medical history of Acne or Asthma.     Past Surgical History:  has a past surgical history that includes  section; Hip surgery (2018); Fracture surgery; Fracture surgery; and Dilation and curettage, diagnostic / therapeutic.    Obstetric History:   OB History    Para Term  AB Living   2 2 2     2   SAB TAB Ectopic Multiple Live Births           2      # Outcome Date GA Lbr Emeterio/2nd Weight Sex Delivery  "Saiges PTL Lv   2 Term 02/08/06 38w0d   F CS-Unspec   LILIAN   1 Term 2005 40w0d   M CS-Unspec   LILIAN          Medications:   Current Outpatient Prescriptions:   •  ibuprofen (MOTRIN) 200 mg tablet, Take 3 tablets (600 mg total) by mouth every 6 (six) hours as needed for mild pain., Disp: , Rfl:       Allergies: has No Known Allergies.       Vital Signs for this encounter: /74 (BP Location: Right upper arm, Patient Position: Sitting)   Ht 1.702 m (5' 7\")   Wt 59 kg (130 lb)   LMP  (LMP Unknown)   BMI 20.36 kg/m²     OBGyn Exam  General Appearance: Alert, cooperative, no acute distress  Head: Normocephalic, without obvious abnormality  Breast: Deferred  Abdomen: Soft, nontender, nondistended,no masses, no organomegaly  Pelvic exam: VULVA: normal appearing vulva with no masses, tenderness or lesions, VAGINA: normal appearing vagina with normal color and discharge, no lesions, CERVIX: normal appearing cervix without discharge or lesions, surgically absent, UTERUS: surgically absent, vaginal cuff well healed no further stiches noted in vault and no blood noted in vault. ADNEXA: normal adnexa in size, nontender and no masses.  Extremities: no edema      Impression/Plan:    58 y.o. is presenting today for Wound Check    1. Patient is for cuff examination after continued bleeding at her post-op check. Cuff is re-approximated nicely. No blood ror stiches in vault. No tenderness or swelling surrounding cuff. Patient is cleared to return to all activities including sexual intercourse. She will stop the estrogen cream. She will call my office if she has any further issues. She will return to Dr. Arellano for her routine annuals.   "

## 2019-07-01 ENCOUNTER — OFFICE VISIT (OUTPATIENT)
Dept: INTERNAL MEDICINE | Facility: CLINIC | Age: 59
End: 2019-07-01
Payer: COMMERCIAL

## 2019-07-01 VITALS
TEMPERATURE: 98.8 F | HEART RATE: 63 BPM | DIASTOLIC BLOOD PRESSURE: 80 MMHG | HEIGHT: 67 IN | BODY MASS INDEX: 19.65 KG/M2 | SYSTOLIC BLOOD PRESSURE: 110 MMHG | WEIGHT: 125.2 LBS | OXYGEN SATURATION: 98 % | RESPIRATION RATE: 16 BRPM

## 2019-07-01 DIAGNOSIS — Z13.29 SCREENING FOR THYROID DISORDER: ICD-10-CM

## 2019-07-01 DIAGNOSIS — Z13.1 SCREENING FOR DIABETES MELLITUS: ICD-10-CM

## 2019-07-01 DIAGNOSIS — Z00.00 ENCOUNTER FOR GENERAL ADULT MEDICAL EXAMINATION WITHOUT ABNORMAL FINDINGS: Primary | ICD-10-CM

## 2019-07-01 DIAGNOSIS — Z13.6 SCREENING FOR CARDIOVASCULAR CONDITION: ICD-10-CM

## 2019-07-01 DIAGNOSIS — Z11.59 ENCOUNTER FOR HEPATITIS C SCREENING TEST FOR LOW RISK PATIENT: ICD-10-CM

## 2019-07-01 DIAGNOSIS — Z13.0 SCREENING, ANEMIA, DEFICIENCY, IRON: ICD-10-CM

## 2019-07-01 DIAGNOSIS — Z23 NEED FOR VACCINATION: ICD-10-CM

## 2019-07-01 PROBLEM — S72.001A CLOSED FRACTURE OF NECK OF RIGHT FEMUR (CMS/HCC): Status: RESOLVED | Noted: 2018-05-05 | Resolved: 2019-07-01

## 2019-07-01 PROBLEM — R60.0 UNILATERAL EDEMA OF LOWER EXTREMITY: Status: RESOLVED | Noted: 2018-09-05 | Resolved: 2019-07-01

## 2019-07-01 PROCEDURE — 36415 COLL VENOUS BLD VENIPUNCTURE: CPT | Performed by: FAMILY MEDICINE

## 2019-07-01 PROCEDURE — 99396 PREV VISIT EST AGE 40-64: CPT | Mod: 25 | Performed by: FAMILY MEDICINE

## 2019-07-01 PROCEDURE — 90471 IMMUNIZATION ADMIN: CPT | Performed by: FAMILY MEDICINE

## 2019-07-01 PROCEDURE — 90632 HEPA VACCINE ADULT IM: CPT | Performed by: FAMILY MEDICINE

## 2019-07-01 ASSESSMENT — ENCOUNTER SYMPTOMS
DYSPHORIC MOOD: 0
EYE ITCHING: 0
NAUSEA: 0
DIZZINESS: 0
TROUBLE SWALLOWING: 0
DIAPHORESIS: 0
WHEEZING: 0
MYALGIAS: 0
ACTIVITY CHANGE: 0
JOINT SWELLING: 0
PALPITATIONS: 0
POLYDIPSIA: 0
ABDOMINAL PAIN: 0
CHEST TIGHTNESS: 0
SHORTNESS OF BREATH: 0
FATIGUE: 0
DIFFICULTY URINATING: 0
DIARRHEA: 0
APPETITE CHANGE: 0
BACK PAIN: 0
COUGH: 0
NERVOUS/ANXIOUS: 0
WEAKNESS: 0
HEADACHES: 0
DYSURIA: 0
VOMITING: 0
UNEXPECTED WEIGHT CHANGE: 0
VOICE CHANGE: 0
SLEEP DISTURBANCE: 0
CHILLS: 0
ADENOPATHY: 0
NUMBNESS: 0
ARTHRALGIAS: 0
BLOOD IN STOOL: 0
CONSTIPATION: 0
BRUISES/BLEEDS EASILY: 0
SORE THROAT: 0
FEVER: 0
TREMORS: 0

## 2019-07-01 NOTE — PROGRESS NOTES
Subjective      Patient ID: Giovanna Noble is a 58 y.o. female.    HPI  Pt is here today for CPE.     The following have been reviewed and updated as appropriate in this visit:  Tobacco  Allergies  Meds  Problems  Med Hx  Surg Hx  Fam Hx  Soc Hx         No Known Allergies      Current Outpatient Prescriptions:   •  ibuprofen (MOTRIN) 200 mg tablet, Take 3 tablets (600 mg total) by mouth every 6 (six) hours as needed for mild pain., Disp: , Rfl:     Past Medical History:   Diagnosis Date   • Abnormal Pap smear of cervix    • Arthritis    • Breast cancer screening 2018    birads 1   • Closed fracture of neck of right femur (CMS/HCC) (HCC) 2018    Added automatically from request for surgery [id number]   • Depression      a year ago   • Endocrine disorder 2016    Pre Diabetes   • History of varicella    • Keratoconus, stable     Carlos Weeks   • Osteopenia 2018   • Pap smear for cervical cancer screening 2018    abnormal pap, hpv abnormal   • Rosacea        Past Surgical History:   Procedure Laterality Date   •  SECTION     • DILATION AND CURETTAGE, DIAGNOSTIC / THERAPEUTIC     • FRACTURE SURGERY      right hip   • FRACTURE SURGERY      right wrist   • HIP SURGERY  2018   • HYSTERECTOMY      ovaries remain       Family History   Problem Relation Age of Onset   • Hypertension Mother    • Glaucoma Mother    • Dementia Mother    • Stroke Father    • Multiple sclerosis Father    • Hypertension Father    • Heart attack Father    • Thyroid cancer Sister    • Alcohol abuse Son    • No Known Problems Brother        Social History     Social History   • Marital status:      Spouse name: N/A   • Number of children: N/A   • Years of education: N/A     Occupational History   • works at [employer]      Social History Main Topics   • Smoking status: Never Smoker   • Smokeless tobacco: Never Used   • Alcohol use Yes      Comment: on occasion   • Drug use: No   • Sexual activity: Yes     " Partners: Male     Birth control/ protection: Post-menopausal     Other Topics Concern   • None     Social History Narrative    Lives with .      Exercise: swimming and yoga, high [employer] (work is active)    Sleep: 7 hours    Nutrition: Getting purple carrot (vegan mail) 3 times a week, plus salads       Review of Systems   Constitutional: Negative for activity change, appetite change, chills, diaphoresis, fatigue, fever and unexpected weight change.   HENT: Negative for hearing loss, sore throat, trouble swallowing and voice change.    Eyes: Negative for itching and visual disturbance.   Respiratory: Negative for cough, chest tightness, shortness of breath and wheezing.    Cardiovascular: Negative for chest pain, palpitations and leg swelling.   Gastrointestinal: Negative for abdominal pain, blood in stool, constipation, diarrhea, nausea and vomiting.   Endocrine: Negative for cold intolerance, polydipsia and polyuria.   Genitourinary: Negative for difficulty urinating, dysuria, pelvic pain, vaginal bleeding and vaginal discharge.   Musculoskeletal: Negative for arthralgias, back pain, joint swelling and myalgias.   Skin: Negative for rash.   Neurological: Negative for dizziness, tremors, syncope, weakness, numbness and headaches.   Hematological: Negative for adenopathy. Does not bruise/bleed easily.   Psychiatric/Behavioral: Negative for dysphoric mood and sleep disturbance. The patient is not nervous/anxious.        Objective   Vitals:    07/01/19 0902   BP: 110/80   BP Location: Right upper arm   Patient Position: Sitting   Pulse: 63   Resp: 16   Temp: 37.1 °C (98.8 °F)   TempSrc: Oral   SpO2: 98%   Weight: 56.8 kg (125 lb 3.2 oz)   Height: 1.702 m (5' 7\")       Physical Exam   Constitutional: She is oriented to person, place, and time. She appears well-developed and well-nourished. No distress.   HENT:   Head: Normocephalic and atraumatic.   Right Ear: External ear normal.   Left Ear: External ear normal. "   Nose: Nose normal.   Mouth/Throat: Oropharynx is clear and moist. No oropharyngeal exudate.   Eyes: Pupils are equal, round, and reactive to light. Conjunctivae and EOM are normal.   Neck: Normal range of motion. No thyromegaly present.   Cardiovascular: Normal rate, regular rhythm, normal heart sounds and intact distal pulses.  Exam reveals no gallop and no friction rub.    No murmur heard.  Pulmonary/Chest: Effort normal and breath sounds normal. No respiratory distress. She has no wheezes. She has no rales.   Abdominal: Soft. Bowel sounds are normal. She exhibits no distension and no mass. There is no tenderness.   Musculoskeletal: Normal range of motion. She exhibits no edema.   Lymphadenopathy:     She has no cervical adenopathy.   Neurological: She is alert and oriented to person, place, and time. She displays normal reflexes. No cranial nerve deficit. She exhibits normal muscle tone. Coordination normal.   Skin: Skin is warm and dry. No rash noted.   Psychiatric: She has a normal mood and affect. Her behavior is normal.       Assessment/Plan   Problem List Items Addressed This Visit     Encounter for general adult medical examination without abnormal findings - Primary     Vaccines: Teatnus is up to date    Annual Flu vaccine recommended seasonally    Shingrix : hen available    Hep A started today, repeat in 6 months.    Labs: as ordered  - fasting  Dental Visits: completed  every 6 months  Vision screenings : completed every 1-2 years  Skin cancer screen : annually through   Derm office  Pap: hysterectomy, follow up through gyn  Mammo: up to date, will do every 2 years  Colonoscopy: Up to date, Due age 62  Dexa Scan: Up to date, due in 1 year                Other Visit Diagnoses     Encounter for hepatitis C screening test for low risk patient        Relevant Orders    Hepatitis C antibody    Screening for diabetes mellitus        Relevant Orders    Hemoglobin A1c    Comprehensive metabolic panel     Screening, anemia, deficiency, iron        Relevant Orders    CBC and differential    Screening for thyroid disorder        Relevant Orders    TSH    Screening for cardiovascular condition        Relevant Orders    Lipid panel    Need for vaccination        Relevant Orders    Hepatitis A vaccine adult IM

## 2019-07-01 NOTE — PATIENT INSTRUCTIONS
Problem List Items Addressed This Visit     Encounter for general adult medical examination without abnormal findings - Primary     Vaccines: Teatnus is up to date    Annual Flu vaccine recommended seasonally    Shingrix : hen available    Hep A started today, repeat in 6 months.    Labs: as ordered  - fasting  Dental Visits: completed  every 6 months  Vision screenings : completed every 1-2 years  Skin cancer screen : annually through   Derm office  Pap: hysterectomy, follow up through gyn  Mammo: up to date, will do every 2 years  Colonoscopy: Up to date, Due age 62  Dexa Scan: Up to date, due in 1 year                Other Visit Diagnoses     Encounter for hepatitis C screening test for low risk patient        Relevant Orders    Hepatitis C antibody    Screening for diabetes mellitus        Relevant Orders    Hemoglobin A1c    Comprehensive metabolic panel    Screening, anemia, deficiency, iron        Relevant Orders    CBC and differential    Screening for thyroid disorder        Relevant Orders    TSH    Screening for cardiovascular condition        Relevant Orders    Lipid panel    Need for vaccination        Relevant Orders    Hepatitis A vaccine adult IM

## 2019-07-01 NOTE — ASSESSMENT & PLAN NOTE
Vaccines: Teatnus is up to date    Annual Flu vaccine recommended seasonally    Shingrix : hen available    Hep A started today, repeat in 6 months.    Labs: as ordered  - fasting  Dental Visits: completed  every 6 months  Vision screenings : completed every 1-2 years  Skin cancer screen : annually through   Derm office  Pap: hysterectomy, follow up through gyn  Mammo: up to date, will do every 2 years  Colonoscopy: Up to date, Due age 62  Dexa Scan: Up to date, due in 1 year

## 2019-07-02 LAB
ALBUMIN SERPL-MCNC: 4.9 G/DL (ref 3.6–5.1)
ALBUMIN/GLOB SERPL: 1.8 (CALC) (ref 1–2.5)
ALP SERPL-CCNC: 90 U/L (ref 33–130)
ALT SERPL-CCNC: 16 U/L (ref 6–29)
AST SERPL-CCNC: 28 U/L (ref 10–35)
BASOPHILS # BLD AUTO: 31 CELLS/UL (ref 0–200)
BASOPHILS NFR BLD AUTO: 0.5 %
BILIRUB SERPL-MCNC: 0.4 MG/DL (ref 0.2–1.2)
BUN SERPL-MCNC: 16 MG/DL (ref 7–25)
BUN/CREAT SERPL: ABNORMAL (CALC) (ref 6–22)
CALCIUM SERPL-MCNC: 10 MG/DL (ref 8.6–10.4)
CHLORIDE SERPL-SCNC: 103 MMOL/L (ref 98–110)
CHOLEST SERPL-MCNC: 214 MG/DL
CHOLEST/HDLC SERPL: 2.8 (CALC)
CO2 SERPL-SCNC: 26 MMOL/L (ref 20–32)
CREAT SERPL-MCNC: 0.65 MG/DL (ref 0.5–1.05)
EOSINOPHIL # BLD AUTO: 112 CELLS/UL (ref 15–500)
EOSINOPHIL NFR BLD AUTO: 1.8 %
ERYTHROCYTE [DISTWIDTH] IN BLOOD BY AUTOMATED COUNT: 12.7 % (ref 11–15)
GLOBULIN SER CALC-MCNC: 2.8 G/DL (CALC) (ref 1.9–3.7)
GLUCOSE SERPL-MCNC: 74 MG/DL (ref 65–99)
HBA1C MFR BLD: 5.5 % OF TOTAL HGB
HCT VFR BLD AUTO: 45.5 % (ref 35–45)
HCV AB S/CO SERPL IA: 0.01
HCV AB SERPL QL IA: NORMAL
HDLC SERPL-MCNC: 76 MG/DL
HGB BLD-MCNC: 14.8 G/DL (ref 11.7–15.5)
LDLC SERPL CALC-MCNC: 123 MG/DL (CALC)
LYMPHOCYTES # BLD AUTO: 2127 CELLS/UL (ref 850–3900)
LYMPHOCYTES NFR BLD AUTO: 34.3 %
MCH RBC QN AUTO: 30.3 PG (ref 27–33)
MCHC RBC AUTO-ENTMCNC: 32.5 G/DL (ref 32–36)
MCV RBC AUTO: 93.2 FL (ref 80–100)
MONOCYTES # BLD AUTO: 564 CELLS/UL (ref 200–950)
MONOCYTES NFR BLD AUTO: 9.1 %
NEUTROPHILS # BLD AUTO: 3367 CELLS/UL (ref 1500–7800)
NEUTROPHILS NFR BLD AUTO: 54.3 %
NONHDLC SERPL-MCNC: 138 MG/DL (CALC)
PLATELET # BLD AUTO: 238 THOUSAND/UL (ref 140–400)
PMV BLD REES-ECKER: 10.8 FL (ref 7.5–12.5)
POTASSIUM SERPL-SCNC: 5.6 MMOL/L (ref 3.5–5.3)
PROT SERPL-MCNC: 7.7 G/DL (ref 6.1–8.1)
QUEST EGFR NON-AFR. AMERICAN: 99 ML/MIN/1.73M2
RBC # BLD AUTO: 4.88 MILLION/UL (ref 3.8–5.1)
SODIUM SERPL-SCNC: 139 MMOL/L (ref 135–146)
TRIGL SERPL-MCNC: 48 MG/DL
TSH SERPL-ACNC: 1.71 MIU/L (ref 0.4–4.5)
WBC # BLD AUTO: 6.2 THOUSAND/UL (ref 3.8–10.8)

## 2021-06-14 ENCOUNTER — OFFICE VISIT (OUTPATIENT)
Dept: INTERNAL MEDICINE | Facility: CLINIC | Age: 61
End: 2021-06-14
Payer: COMMERCIAL

## 2021-06-14 VITALS
TEMPERATURE: 98 F | HEART RATE: 51 BPM | OXYGEN SATURATION: 99 % | WEIGHT: 119 LBS | SYSTOLIC BLOOD PRESSURE: 136 MMHG | BODY MASS INDEX: 19.13 KG/M2 | RESPIRATION RATE: 16 BRPM | DIASTOLIC BLOOD PRESSURE: 70 MMHG | HEIGHT: 66 IN

## 2021-06-14 DIAGNOSIS — E78.5 HYPERLIPIDEMIA, UNSPECIFIED HYPERLIPIDEMIA TYPE: ICD-10-CM

## 2021-06-14 DIAGNOSIS — Z00.00 ENCOUNTER FOR ANNUAL PHYSICAL EXAM: Primary | ICD-10-CM

## 2021-06-14 DIAGNOSIS — Z12.31 ENCOUNTER FOR SCREENING MAMMOGRAM FOR MALIGNANT NEOPLASM OF BREAST: ICD-10-CM

## 2021-06-14 DIAGNOSIS — Z82.49 FAMILY HISTORY OF ISCHEMIC HEART DISEASE: ICD-10-CM

## 2021-06-14 DIAGNOSIS — M81.0 OSTEOPOROSIS WITHOUT CURRENT PATHOLOGICAL FRACTURE, UNSPECIFIED OSTEOPOROSIS TYPE: ICD-10-CM

## 2021-06-14 PROBLEM — R87.629 ABNORMAL VAGINAL PAP SMEAR: Status: RESOLVED | Noted: 2018-12-26 | Resolved: 2021-06-14

## 2021-06-14 PROBLEM — S62.101A CLOSED FRACTURE OF RIGHT WRIST: Status: RESOLVED | Noted: 2018-05-06 | Resolved: 2021-06-14

## 2021-06-14 PROBLEM — R85.610 PAPANICOLAOU SMEAR OF ANUS WITH ATYPICAL SQUAMOUS CELLS OF UNDETERMINED SIGNIFICANCE (ASC-US): Status: RESOLVED | Noted: 2018-09-10 | Resolved: 2021-06-14

## 2021-06-14 PROCEDURE — 99396 PREV VISIT EST AGE 40-64: CPT | Performed by: INTERNAL MEDICINE

## 2021-06-14 PROCEDURE — 3008F BODY MASS INDEX DOCD: CPT | Performed by: INTERNAL MEDICINE

## 2021-06-14 ASSESSMENT — PATIENT HEALTH QUESTIONNAIRE - PHQ9: SUM OF ALL RESPONSES TO PHQ9 QUESTIONS 1 & 2: 0

## 2021-06-14 NOTE — PROGRESS NOTES
Giovanna Noble is a 60 y.o. female who presents today for     Chief Complaint   Patient presents with   • Annual Exam   • Insect Bite     forehead   • Reflux / GERD     burning sensation       SUBJECTIVE:  HPI  Here today for annual exam.    Supplement: pre- and probiotic in the morning     History of right wrist and right femur fracture.  Fell in a parking lot.   She has been working out w/ weights 2-3 times per week.     She has been having a lot of indigestion lately. Overall improved w/ dietary changes. Cannot eat after 8PM.   Limits breads, candy, chocolate, sugar, soda, red meat.   Vegan abut three times per day.     Sometimes has lack of motivation.   Otherwise not feeling down/ depressed.  Sleeping well 7-8 hrs/ night.   Previously took zoloft.     No Known Allergies    No current outpatient medications on file.    Past Medical History:   Diagnosis Date   • Abnormal Pap smear of cervix    • Acne    • Arthritis    • Breast cancer screening 2018    birads 1   • Closed fracture of neck of right femur (CMS/HCC) 2018    Added automatically from request for surgery [id number]   • Closed fracture of right wrist 2018   • Depression      a year ago   • Endocrine disorder 2016    Pre Diabetes   • History of varicella    • Keratoconus, stable     Carlos Weeks   • Osteopenia 2018   • Pap smear for cervical cancer screening 2018    abnormal pap, hpv abnormal   • Papanicolaou smear of anus with atypical squamous cells of undetermined significance (ASC-US) 9/10/2018    Added automatically from request for surgery [id number]   • Rosacea        Past Surgical History:   Procedure Laterality Date   •  SECTION     • DILATION AND CURETTAGE, DIAGNOSTIC / THERAPEUTIC     • FRACTURE SURGERY      right hip   • FRACTURE SURGERY      right wrist   • HIP SURGERY  2018   • HYSTERECTOMY      ovaries remain       Family History   Problem Relation Age of Onset   • Hypertension Biological Mother    •  Glaucoma Biological Mother    • Dementia Biological Mother    • Stroke Biological Father    • Multiple sclerosis Biological Father    • Hypertension Biological Father    • Heart attack Biological Father         mid-50's   • Thyroid cancer Biological Sister    • Alcohol abuse Biological Son    • No Known Problems Biological Brother    • Breast cancer Neg Hx    • Colon cancer Neg Hx        Social History     Socioeconomic History   • Marital status:      Spouse name: Not on file   • Number of children: Not on file   • Years of education: Not on file   • Highest education level: Not on file   Occupational History   • Occupation: works at [employer]   Tobacco Use   • Smoking status: Never Smoker   • Smokeless tobacco: Never Used   Vaping Use   • Vaping Use: Never used   Substance and Sexual Activity   • Alcohol use: Yes     Comment: on occasion   • Drug use: No   • Sexual activity: Yes     Partners: Male     Birth control/protection: Post-menopausal   Other Topics Concern   • Not on file   Social History Narrative    Lives with .      Exercise: swimming and yoga, high NEAT (work is active)    Sleep: 7 hours    Nutrition: Getting purple carrot (vegan mail) 3 times a week, plus salads     Social Determinants of Health     Financial Resource Strain:    • Difficulty of Paying Living Expenses:    Food Insecurity:    • Worried About Running Out of Food in the Last Year:    • Ran Out of Food in the Last Year:    Transportation Needs:    • Lack of Transportation (Medical):    • Lack of Transportation (Non-Medical):    Physical Activity:    • Days of Exercise per Week:    • Minutes of Exercise per Session:    Stress:    • Feeling of Stress :    Social Connections:    • Frequency of Communication with Friends and Family:    • Frequency of Social Gatherings with Friends and Family:    • Attends [employer] Services:    • Active Member of Clubs or Organizations:    • Attends Club or Organization Meetings:    • Marital  "Status:    Intimate Partner Violence:    • Fear of Current or Ex-Partner:    • Emotionally Abused:    • Physically Abused:    • Sexually Abused:        ROS   Constitutional: weight loss with dietary changes, weight has been stable since then   HENT: no nasal congestion, post-nasal drip   Eyes: denies recent changes in vision   Cardiac: no chest pain or palpitations  Respiratory: denies shortness of breath and cough   GI: no abdominal pain. Constipation with certain foods. Denies melena and hematochezia  : per HPI  Msk: right wrist (previously fractured) is still bothersome   Neuro: no headaches  Skin: bug bite on forehead (last week), turned into a welt   Dryness above her left eyelid     OBJECTIVE:  Vitals:    06/14/21 1302   BP: 136/70   BP Location: Right upper arm   Patient Position: Sitting   Pulse: (!) 51   Resp: 16   Temp: 36.7 °C (98 °F)   TempSrc: Skin   SpO2: 99%   Weight: 54 kg (119 lb)   Height: 1.683 m (5' 6.25\")       Wt Readings from Last 3 Encounters:   06/14/21 54 kg (119 lb)   07/01/19 56.8 kg (125 lb 3.2 oz)   03/27/19 59 kg (130 lb)     Body mass index is 19.06 kg/m².      Physical Exam  Constitutional: well-appearing female   Eyes: PERRL, conjunctiva normal   HENT: normocephalic, nares normal, TM with +light reflex bilaterally, external ear canals normal, oral mucosa moist, no pharyngeal exudates   Neck: no cervical lymphadenopathy  Cardiac: RRR, no murmurs, pulses equal in all extremities   Respiratory: lungs clear to auscultation bilaterally, no wheezing/rales/rhonchi  Abdomen: soft, non-tender, non-distended. bowel sounds normal   Msk: no LE edema   Neuro: no focal neurological deficits   Skin: warm, dry   Healing bug bite on forehead, minimal surrounding erythema, no purulence     ASSESSMENT & PLAN:   Diagnoses and all orders for this visit:    Encounter for annual physical exam (Primary)  Assessment & Plan:  Patient presents today for her annual well exam.     Lifestyle, Screening & " Prevention  Nutrition: Body mass index is 19.06 kg/m².  -- recommended whole foods, largely plant-based diet   -- limit red and processed meats, saturated fats, refined carbohydrates and processed foods    Physical activity:   -- encouraged routine physical activity with goal of 150 minutes of moderate aerobic activity and 2-3 days of weight-bearing activity per week        Age-appropriate cancer screening  Cervical cancer:   -- S/p hysterectomy    Breast cancer:  -- Mammogram ordered   Colorectal cancer:   -- up to date per patient, will request records   Melanoma:  -- recommend annual skin exam and monthly self exam     Immunizations / Infectious disease  Received COVID vaccine     Immunization History   Administered Date(s) Administered   • Hepatitis A 07/01/2019   • Influenza TIV (IM) 01/01/2009   • Influenza Vaccine Quadrivalent Preservative Free 6-35 Months 10/11/2013   • Sars-cov-2 (Covid-19) Vaccine, Moderna 01/13/2021, 02/10/2021   • Tdap 02/28/2017       Health Maintenance Due   Topic Date Due   • HIV Screening  Never done   • Zoster Vaccine (1 of 2) Never done   • Mammogram  07/05/2020       Orders:  -     CBC and Differential; Future  -     Comprehensive metabolic panel; Future  -     Hemoglobin A1c; Future  -     TSH w reflex FT4; Future  -     Lipid panel; Future    Osteoporosis without current pathological fracture, unspecified osteoporosis type  Assessment & Plan:  DEXA in 2018 consistent w/ osteopenia, however she has had fragility fracture.     - repeat DEXA   - labs as ordered   - discussed importance of routine exercise, vitamin D and calcium in the diet   - recommend consult w/ endocrinology     Orders:  -     TSH w reflex FT4; Future  -     Vitamin D 25 hydroxy; Future  -     Celiac reflex panel; Future  -     PTH, intact; Future  -     DEXA BONE DENSITY; Future    Encounter for screening mammogram for malignant neoplasm of breast  -     BI SCREENING MAMMOGRAM BILATERAL(TOMOSYNTHESIS);  Future    Hyperlipidemia, unspecified hyperlipidemia type  Assessment & Plan:  Hx HLD. Family hx CAD in father.     - lipid panel  - coronary calcium score     Orders:  -     CT HEART CORONARY ARTERY CALCIUM SCORE WITHOUT IV CONTRAST; Future    Family history of ischemic heart disease  -     CT HEART CORONARY ARTERY CALCIUM SCORE WITHOUT IV CONTRAST; Future          Recommended plan and education provided to the patient are documented in After Visit Summary / Patient Instructions sections of this encounter.

## 2021-06-14 NOTE — ASSESSMENT & PLAN NOTE
Patient presents today for her annual well exam.     Lifestyle, Screening & Prevention  Nutrition: Body mass index is 19.06 kg/m².  -- recommended whole foods, largely plant-based diet   -- limit red and processed meats, saturated fats, refined carbohydrates and processed foods    Physical activity:   -- encouraged routine physical activity with goal of 150 minutes of moderate aerobic activity and 2-3 days of weight-bearing activity per week        Age-appropriate cancer screening  Cervical cancer:   -- S/p hysterectomy    Breast cancer:  -- Mammogram ordered   Colorectal cancer:   -- up to date per patient, will request records   Melanoma:  -- recommend annual skin exam and monthly self exam     Immunizations / Infectious disease  Received COVID vaccine     Immunization History   Administered Date(s) Administered   • Hepatitis A 07/01/2019   • Influenza TIV (IM) 01/01/2009   • Influenza Vaccine Quadrivalent Preservative Free 6-35 Months 10/11/2013   • Sars-cov-2 (Covid-19) Vaccine, Moderna 01/13/2021, 02/10/2021   • Tdap 02/28/2017       Health Maintenance Due   Topic Date Due   • HIV Screening  Never done   • Zoster Vaccine (1 of 2) Never done   • Mammogram  07/05/2020

## 2021-06-14 NOTE — ASSESSMENT & PLAN NOTE
DEXA in 2018 consistent w/ osteopenia, however she has had fragility fracture.     - repeat DEXA   - labs as ordered   - discussed importance of routine exercise, vitamin D and calcium in the diet   - recommend consult w/ endocrinology

## 2021-06-14 NOTE — PATIENT INSTRUCTIONS
"Endocrinology recommendations  [facility]  Tel. [phone]  [facility] [address], [address]   [address] [place] [address]     Thank you for coming in today for your wellness exam.   An annual health care visit is a great way to take control of your own health and wellness. Routine health care visits can 1) help find problems early through appropriate screening and 2) help prevent health problems before they occur.     Here are some ways that you can positively affect your health:    -- Eat a largely plant-based diet. This includes the following:   -- fruits and vegetables    -- legumes    -- whole grains (ex: brown or wild rice, quinoa, farro, steel cut oats)   -- healthy fats: extra virgin olive oil, nuts, avocado   -- Limit your consumption of the following:   -- red meat and processed meats     -- dairy (ex: milk, yogurt, cheese)   -- refined carbohydrates (ex: white bread, white rice)   -- processed foods   -- added or artificial sugar    -- trans fats and saturated fats    -- \"junk food\" (ex: granola bars, cookies, cakes, candy, chips)  -- Drink at least 60 oz of water each day.  -- Make regular, scheduled exercise a part of your weekly routine.    -- 150 minutes of moderate-intensity aerobic activity (ex: brisk walking)   -- muscle-strengthening exercises at least 2 days per week   -- Do your best to get at least 7-8 hours of sleep each night.   -- Limit your alcohol consumption to no more than 1 drink per day and no more than 5 drinks per week.  -- Do not smoke cigarettes or e-cigarettes. If you do, please do your best to cut back and quit all together. This is one of the best things that you can do for your overall health.  -- Make sure to have a dental exam every 6 months and a routine eye exam annually.   -- Wear SPF daily and check your skin each month to make sure there are no new or changing lesions. You should have a routine skin exam every year.   -- Wear your seatbelt " in the car and do not text while driving.     Please sign up for MyChart if you have not done so already. This is our preferred method of communication.

## 2021-06-22 ENCOUNTER — HOSPITAL ENCOUNTER (OUTPATIENT)
Dept: RADIOLOGY | Age: 61
Discharge: HOME | End: 2021-06-22
Attending: INTERNAL MEDICINE
Payer: COMMERCIAL

## 2021-06-22 DIAGNOSIS — Z12.31 ENCOUNTER FOR SCREENING MAMMOGRAM FOR MALIGNANT NEOPLASM OF BREAST: ICD-10-CM

## 2021-06-22 PROCEDURE — 77067 SCR MAMMO BI INCL CAD: CPT

## 2021-06-26 ENCOUNTER — APPOINTMENT (OUTPATIENT)
Dept: LAB | Facility: HOSPITAL | Age: 61
End: 2021-06-26
Attending: INTERNAL MEDICINE
Payer: COMMERCIAL

## 2021-06-26 DIAGNOSIS — Z00.00 ENCOUNTER FOR ANNUAL PHYSICAL EXAM: ICD-10-CM

## 2021-06-26 DIAGNOSIS — M81.0 OSTEOPOROSIS WITHOUT CURRENT PATHOLOGICAL FRACTURE, UNSPECIFIED OSTEOPOROSIS TYPE: ICD-10-CM

## 2021-06-26 LAB
25(OH)D3 SERPL-MCNC: 19 NG/ML (ref 30–100)
ALBUMIN SERPL-MCNC: 4.1 G/DL (ref 3.4–5)
ALP SERPL-CCNC: 74 IU/L (ref 35–126)
ALT SERPL-CCNC: 24 IU/L (ref 11–54)
ANION GAP SERPL CALC-SCNC: 9 MEQ/L (ref 3–15)
AST SERPL-CCNC: 28 IU/L (ref 15–41)
BASOPHILS # BLD: 0.04 K/UL (ref 0.01–0.1)
BASOPHILS NFR BLD: 0.6 %
BILIRUB SERPL-MCNC: 0.5 MG/DL (ref 0.3–1.2)
BUN SERPL-MCNC: 12 MG/DL (ref 8–20)
CALCIUM SERPL-MCNC: 9.6 MG/DL (ref 8.9–10.3)
CALCIUM SERPL-MCNC: 9.6 MG/DL (ref 8.9–10.3)
CHLORIDE SERPL-SCNC: 106 MEQ/L (ref 98–109)
CHOLEST SERPL-MCNC: 187 MG/DL
CO2 SERPL-SCNC: 27 MEQ/L (ref 22–32)
CREAT SERPL-MCNC: 0.7 MG/DL (ref 0.6–1.1)
DIFFERENTIAL METHOD BLD: NORMAL
EOSINOPHIL # BLD: 0.11 K/UL (ref 0.04–0.36)
EOSINOPHIL NFR BLD: 1.8 %
ERYTHROCYTE [DISTWIDTH] IN BLOOD BY AUTOMATED COUNT: 13 % (ref 11.7–14.4)
EST. AVERAGE GLUCOSE BLD GHB EST-MCNC: 114 MG/DL
GFR SERPL CREATININE-BSD FRML MDRD: >60 ML/MIN/1.73M*2
GLUCOSE SERPL-MCNC: 89 MG/DL (ref 70–99)
HBA1C MFR BLD HPLC: 5.6 %
HCT VFR BLDCO AUTO: 44 % (ref 35–45)
HDLC SERPL-MCNC: 63 MG/DL
HDLC SERPL: 3 {RATIO}
HGB BLD-MCNC: 14.3 G/DL (ref 11.8–15.7)
IMM GRANULOCYTES # BLD AUTO: 0.01 K/UL (ref 0–0.08)
IMM GRANULOCYTES NFR BLD AUTO: 0.2 %
LDLC SERPL CALC-MCNC: 116 MG/DL
LYMPHOCYTES # BLD: 1.78 K/UL (ref 1.2–3.5)
LYMPHOCYTES NFR BLD: 28.4 %
MCH RBC QN AUTO: 30.6 PG (ref 28–33.2)
MCHC RBC AUTO-ENTMCNC: 32.5 G/DL (ref 32.2–35.5)
MCV RBC AUTO: 94.2 FL (ref 83–98)
MONOCYTES # BLD: 0.52 K/UL (ref 0.28–0.8)
MONOCYTES NFR BLD: 8.3 %
NEUTROPHILS # BLD: 3.8 K/UL (ref 1.7–7)
NEUTS SEG NFR BLD: 60.7 %
NONHDLC SERPL-MCNC: 124 MG/DL
NRBC BLD-RTO: 0 %
PDW BLD AUTO: 11.4 FL (ref 9.4–12.3)
PLATELET # BLD AUTO: 220 K/UL (ref 150–369)
POTASSIUM SERPL-SCNC: 4.5 MEQ/L (ref 3.6–5.1)
PROT SERPL-MCNC: 6.8 G/DL (ref 6–8.2)
PTH-INTACT SERPL-MCNC: 54.1 PG/ML (ref 12–88)
RBC # BLD AUTO: 4.67 M/UL (ref 3.93–5.22)
SODIUM SERPL-SCNC: 142 MEQ/L (ref 136–144)
TRIGL SERPL-MCNC: 41 MG/DL (ref 30–149)
TSH SERPL DL<=0.05 MIU/L-ACNC: 1.75 MIU/L (ref 0.34–5.6)
WBC # BLD AUTO: 6.26 K/UL (ref 3.8–10.5)

## 2021-06-26 PROCEDURE — 36415 COLL VENOUS BLD VENIPUNCTURE: CPT

## 2021-06-26 PROCEDURE — 85025 COMPLETE CBC W/AUTO DIFF WBC: CPT

## 2021-06-26 PROCEDURE — 84443 ASSAY THYROID STIM HORMONE: CPT

## 2021-06-26 PROCEDURE — 83516 IMMUNOASSAY NONANTIBODY: CPT

## 2021-06-26 PROCEDURE — 82784 ASSAY IGA/IGD/IGG/IGM EACH: CPT

## 2021-06-26 PROCEDURE — 80053 COMPREHEN METABOLIC PANEL: CPT

## 2021-06-26 PROCEDURE — 83036 HEMOGLOBIN GLYCOSYLATED A1C: CPT

## 2021-06-26 PROCEDURE — 83970 ASSAY OF PARATHORMONE: CPT

## 2021-06-26 PROCEDURE — 82306 VITAMIN D 25 HYDROXY: CPT

## 2021-06-26 PROCEDURE — 80061 LIPID PANEL: CPT

## 2021-06-29 LAB
IGA SERPL-MCNC: 154 MG/DL (ref 84.5–499)
TTG IGA SER IA-ACNC: <0.1 ELIA U/ML

## 2021-06-30 RX ORDER — ERGOCALCIFEROL 1.25 MG/1
50000 CAPSULE ORAL WEEKLY
Qty: 12 CAPSULE | Refills: 0 | Status: SHIPPED | OUTPATIENT
Start: 2021-06-30 | End: 2021-10-25

## 2021-07-16 ENCOUNTER — HOSPITAL ENCOUNTER (OUTPATIENT)
Dept: RADIOLOGY | Age: 61
Discharge: HOME | End: 2021-07-16
Attending: INTERNAL MEDICINE
Payer: COMMERCIAL

## 2021-07-16 DIAGNOSIS — M81.0 OSTEOPOROSIS WITHOUT CURRENT PATHOLOGICAL FRACTURE, UNSPECIFIED OSTEOPOROSIS TYPE: ICD-10-CM

## 2021-07-16 PROCEDURE — 77080 DXA BONE DENSITY AXIAL: CPT

## 2021-10-25 ENCOUNTER — APPOINTMENT (OUTPATIENT)
Dept: LAB | Facility: HOSPITAL | Age: 61
End: 2021-10-25
Attending: INTERNAL MEDICINE
Payer: COMMERCIAL

## 2021-10-25 ENCOUNTER — OFFICE VISIT (OUTPATIENT)
Dept: ENDOCRINOLOGY | Facility: CLINIC | Age: 61
End: 2021-10-25
Payer: COMMERCIAL

## 2021-10-25 VITALS
DIASTOLIC BLOOD PRESSURE: 80 MMHG | BODY MASS INDEX: 19.46 KG/M2 | HEART RATE: 62 BPM | OXYGEN SATURATION: 99 % | SYSTOLIC BLOOD PRESSURE: 130 MMHG | WEIGHT: 124 LBS | HEIGHT: 67 IN

## 2021-10-25 DIAGNOSIS — S72.001S CLOSED FRACTURE OF RIGHT HIP, SEQUELA: ICD-10-CM

## 2021-10-25 DIAGNOSIS — Z00.00 ROUTINE GENERAL MEDICAL EXAMINATION AT A HEALTH CARE FACILITY: Primary | ICD-10-CM

## 2021-10-25 DIAGNOSIS — M81.0 OSTEOPOROSIS WITHOUT CURRENT PATHOLOGICAL FRACTURE, UNSPECIFIED OSTEOPOROSIS TYPE: Primary | ICD-10-CM

## 2021-10-25 LAB
25(OH)D3 SERPL-MCNC: 34 NG/ML (ref 30–100)
ANION GAP SERPL CALC-SCNC: 11 MEQ/L (ref 3–15)
BUN SERPL-MCNC: 13 MG/DL (ref 8–20)
CALCIUM SERPL-MCNC: 9.6 MG/DL (ref 8.9–10.3)
CALCIUM SERPL-MCNC: 9.6 MG/DL (ref 8.9–10.3)
CHLORIDE SERPL-SCNC: 103 MEQ/L (ref 98–109)
CO2 SERPL-SCNC: 26 MEQ/L (ref 22–32)
CREAT SERPL-MCNC: 0.7 MG/DL (ref 0.6–1.1)
GFR SERPL CREATININE-BSD FRML MDRD: >60 ML/MIN/1.73M*2
GLUCOSE SERPL-MCNC: 85 MG/DL (ref 70–99)
POTASSIUM SERPL-SCNC: 4.5 MEQ/L (ref 3.6–5.1)
PTH-INTACT SERPL-MCNC: 55.3 PG/ML (ref 12–88)
SODIUM SERPL-SCNC: 140 MEQ/L (ref 136–144)

## 2021-10-25 PROCEDURE — 83970 ASSAY OF PARATHORMONE: CPT

## 2021-10-25 PROCEDURE — 99204 OFFICE O/P NEW MOD 45 MIN: CPT | Performed by: INTERNAL MEDICINE

## 2021-10-25 PROCEDURE — 36415 COLL VENOUS BLD VENIPUNCTURE: CPT

## 2021-10-25 PROCEDURE — 82306 VITAMIN D 25 HYDROXY: CPT

## 2021-10-25 PROCEDURE — 80048 BASIC METABOLIC PNL TOTAL CA: CPT

## 2021-10-25 NOTE — PATIENT INSTRUCTIONS
Patient Education   Abaloparatide injection  What is this medicine?  ABALOPARATIDE (a ball oh PAR a tide) helps increase bone strength. It treats osteoporosis. It may help reduce your risk of a bone fracture.  This medicine may be used for other purposes; ask your health care provider or pharmacist if you have questions.  COMMON BRAND NAME(S): TYMLOS  What should I tell my health care provider before I take this medicine?  They need to know if you have any of these conditions:  · bone disease other than osteoporosis  · high levels of calcium in the blood  · high levels of an enzyme called alkaline phosphatase in the blood  · history of cancer in the bone  · kidney stone  · Paget's disease  · parathyroid disease  · receiving radiation therapy  · an unusual or allergic reaction to abaloparatide, other medicines, foods, dyes, or preservatives  · pregnant or trying to get pregnant  · breast-feeding  How should I use this medicine?  This medicine is for injection under the skin. Use this medicine at the same time each day. You will be taught how to prepare and give this medicine. Use exactly as directed. Do not take your medicine more often than directed.  It is important that you put your used needles and pens in a special sharps container. Do not put them in a trash can. If you do not have a sharps container, call your pharmacist or health care provider to get one.  A special MedGuide will be given to you by the pharmacist with each prescription and refill. Be sure to read this information carefully each time.  Talk to your pediatrician regarding the use of this medicine in children. Special care may be needed.  Overdosage: If you think you have taken too much of this medicine contact a poison control center or emergency room at once.  NOTE: This medicine is only for you. Do not share this medicine with others.  What if I miss a dose?  If you miss a dose, take it as soon as you can. If it is almost time for your next  dose, take only that dose. Do not take double or extra doses.  What may interact with this medicine?  Interactions have not been studied.  This list may not describe all possible interactions. Give your health care provider a list of all the medicines, herbs, non-prescription drugs, or dietary supplements you use. Also tell them if you smoke, drink alcohol, or use illegal drugs. Some items may interact with your medicine.  What should I watch for while using this medicine?  Visit your doctor or health care professional for regular checks on your progress. You may need blood work done while you are taking this medicine.  You may get dizzy. Do not drive, use machinery, or do anything that needs mental alertness until you know how this medicine affects you. Do not stand or sit up quickly, especially if you are an older patient. This reduces the risk of dizzy or fainting spells.  You should make sure that you get enough calcium and vitamin D while you are taking this medicine. Discuss the foods you eat and the vitamins you take with your health care professional.  Talk to your doctor about your risk of cancer. You may be more at risk for certain types of cancers if you take this medicine.  Pens should never be shared. Even if the needle is changed, sharing may result in passing of viruses like hepatitis or HIV.  What side effects may I notice from receiving this medicine?  Side effects that you should report to your doctor or health care professional as soon as possible:  · allergic reactions (skin rash, itching or hives; swelling of the face, lips, or tongue)  · high calcium levels (nausea; vomiting; constipation; low energy; muscle weakness)  · kidney stones (blood in the urine; pain when urinating; pain in the lower back or side)  · low blood pressure (dizziness; feeling faint or lightheaded, falls; unusually weak or tired)  Side effects that usually do not require medical attention (report these to your doctor or  health care professional if they continue or are bothersome):  · dizziness  · headache  · nausea  · pain, redness, or irritation at site where injected  · tiredness  This list may not describe all possible side effects. Call your doctor for medical advice about side effects. You may report side effects to FDA at 3-293-MIZ-5852.  Where should I keep my medicine?  Keep out of the reach of children.  Store unopened pens in the refrigerator between 2 and 8 degrees C (36 and 46 degrees F). Do not freeze. Avoid exposure to heat. Throw away any unopened medicine after the expiration date on the label.  After the first use, store your pen for up to 30 days at room temperature between 20 and 25 degrees C (68 and 77 degrees F). Do not store with a needle attached to the pen. Use the pen for only 30 days. Throw away your pen 30 days after first opening it even if the pen still contains medicine.  NOTE: This sheet is a summary. It may not cover all possible information. If you have questions about this medicine, talk to your doctor, pharmacist, or health care provider.  © 2021 [employer]/Gold Standard (2020-11-03 16:44:51)       Patient Education   Romosozumab injection  What is this medicine?  ROMOSOZUMAB (bee susana SOZ ue mab) increases bone formation. It is used to treat osteoporosis in women.  This medicine may be used for other purposes; ask your health care provider or pharmacist if you have questions.  COMMON BRAND NAME(S): EVENITY  What should I tell my health care provider before I take this medicine?  They need to know if you have any of these conditions:  · dental disease or wear dentures  · heart disease  · history of stroke  · kidney disease  · low levels of calcium in the blood  · an unusual or allergic reaction to romosozumab, other medicines, foods, dyes or preservatives  · pregnant or trying to get pregnant  · breast-feeding  How should I use this medicine?  This medicine is for injection under the skin. It is  given by a healthcare professional in a hospital or clinic setting.  Talk to your pediatrician about the use of this medicine in children. Special care may be needed.  Overdosage: If you think you have taken too much of this medicine contact a poison control center or emergency room at once.  NOTE: This medicine is only for you. Do not share this medicine with others.  What if I miss a dose?  Keep appointments for follow-up doses. It is important not to miss your dose. Call your doctor or healthcare professional if you are unable to keep an appointment.  What may interact with this medicine?  Interactions are not expected.  This list may not describe all possible interactions. Give your health care provider a list of all the medicines, herbs, non-prescription drugs, or dietary supplements you use. Also tell them if you smoke, drink alcohol, or use illegal drugs. Some items may interact with your medicine.  What should I watch for while using this medicine?  Your condition will be monitored carefully while you are receiving this medicine.  You may need blood work done while you are taking this medicine.  Some people who take this medicine have severe bone, joint, or muscle pain. This medicine may also increase your risk for jaw problems or a broken thigh bone. Tell your healthcare professional right away if you have severe pain in your jaw, bones, joints, or muscles. Tell your healthcare professional if you have any pain that does not go away or that gets worse.  You should make sure you get enough calcium and vitamin D while you are taking this medicine. Discuss the foods you eat and the vitamins you take with your healthcare professional.  Tell your dentist and dental surgeon that you are taking this medicine. You should not have major dental surgery while on this medicine. See your dentist to have a dental exam and fix any dental problems before starting this medicine. Take good care of your teeth while on this  medicine. Make sure you see your dentist for regular follow-up appointments.  What side effects may I notice from receiving this medicine?  Side effects that you should report to your doctor or health care professional as soon as possible:  · allergic reactions like skin rash, itching or hives, swelling of the face, lips, or tongue  · bone pain  · chest pain or chest tightness  · jaw pain, especially after dental work  · signs and symptoms of a stroke like changes in vision; confusion; trouble speaking or understanding; severe headaches; sudden numbness or weakness of the face, arm or leg; trouble walking; dizziness; loss of balance or coordination  · signs and symptoms of low calcium like fast heartbeat; muscle cramps; muscle pain; pain, tingling, numbness in the hands or feet; seizures  Side effects that usually do not require medical attention (report these to your doctor or health care professional if they continue or are bothersome):  · headache  · joint pain  · pain, redness, or irritation at site where injected  · pain, tingling, numbness in the hands or feet  · swelling of ankles, feet, hands  · trouble sleeping  This list may not describe all possible side effects. Call your doctor for medical advice about side effects. You may report side effects to FDA at 4-021-BVH-0911.  Where should I keep my medicine?  This medicine is given in a hospital or clinic and will not be stored at home.  NOTE: This sheet is a summary. It may not cover all possible information. If you have questions about this medicine, talk to your doctor, pharmacist, or health care provider.  © 2021 [employer]/Gold Standard (2019-04-11 01:15:32)           Get blood test done.   Get urine test done.   Vit D 2000 units daily.   Will discuss calcium supplement after lab.   Return in 6 months.

## 2021-10-25 NOTE — PROGRESS NOTES
Endocrinology Consultation Report     Patient Name: Giovanna Noble  MR#: [id number]  : 1960  Consultant: Jacque Pryor MD      Giovanna Noble is a 61 y.o. female who presents for evaluation for osteoporosis.     HPI:  Patient had a fall from standing position in 2018.  Had R hip and wrist frx. S/p ORIF.  Patient is here for osteoporosis evaluation.     History:   - risk factors:    PHPT: no hypercalcemia. No renal stone.    thyroid problems: none   RA or DM:none   steroids use history: none   early menopause: menopause around age 59.  2019 partial hysterectomy due to abn pap smear.    Physically activity/fall risk:  twice a week. Riding bikes 1-2 times a month. Doing yoga frequently. Walking often.  swimming    Parent hip frx history:none   Diet history: Was on plant based diet before.  Now eating regular diet. No red meat, fish. Occasionally chicken. Not much dairy product. Eating yogurt.    Smoking/alcohol history: none.    Medications: denies heparin, warfarin, PPI, antiseizure medication, cyclosporine, cancer drugs, aromatase inhibitors etc.   - Fracture history   Fragility frx: R hip and wrist frx 2018   Non fragility frx: none  - treatment history:    Calcium supplement: none   Vitamin D supplement: took vit D 50,000 units for 3 months before Sept.    Anti-resorption      Medical History:  Past Medical History:   Diagnosis Date   • Abnormal Pap smear of cervix    • Acne    • Arthritis    • Breast cancer screening 2018    birads 1   • Closed fracture of neck of right femur (CMS/HCC) 2018    Added automatically from request for surgery [id number]   • Closed fracture of right wrist 2018   • Depression      a year ago   • History of varicella    • Keratoconus, stable     Carlos Weeks   • Osteopenia 2018   • Pap smear for cervical cancer screening 2018    abnormal pap, hpv abnormal   • Papanicolaou smear of anus with atypical squamous cells of undetermined significance  "(ASC-US) 9/10/2018    Added automatically from request for surgery [id number]   • Rosacea        Surgical History:   Past Surgical History:   Procedure Laterality Date   •  SECTION     • DILATION AND CURETTAGE, DIAGNOSTIC / THERAPEUTIC     • FRACTURE SURGERY      right hip   • FRACTURE SURGERY      right wrist   • HIP SURGERY  2018   • HYSTERECTOMY      ovaries remain       Family History:  Family History   Problem Relation Age of Onset   • Hypertension Biological Mother    • Glaucoma Biological Mother    • Dementia Biological Mother    • Stroke Biological Father    • Multiple sclerosis Biological Father    • Hypertension Biological Father    • Heart attack Biological Father         mid-50's   • Thyroid cancer Biological Sister    • Alcohol abuse Biological Son    • No Known Problems Biological Brother    • Breast cancer Neg Hx    • Colon cancer Neg Hx        Social history:  Social History     Tobacco Use   • Smoking status: Never Smoker   • Smokeless tobacco: Never Used   Vaping Use   • Vaping Use: Never used   Substance Use Topics   • Alcohol use: Yes     Comment: on occasion   • Drug use: No       Medication(active prior to today):  No current outpatient medications on file.     No current facility-administered medications for this visit.       Allergies:  Patient has no known allergies.    ROS:  All other systems reviewed and negative except as noted in HPI    PE:  Vitals:    10/25/21 1013   BP: 130/80   BP Location: Right upper arm   Patient Position: Sitting   Pulse: 62   SpO2: 99%   Weight: 56.2 kg (124 lb)   Height: 1.702 m (5' 7\")     Body mass index is 19.42 kg/m².    Constitutional: well-developed. well- nourished  HENT: Head: Normocephalic and atraumatic.   Eyes: Conjunctivae and EOM are normal. PERRL  Neck: Normal range of motion. Neck supple. No JVD. No thyromegaly present.   Cardiovascular: Normal rate, regular rhythm and normal heart sounds.    Pulmonary/Chest: Effort normal and breath " sounds normal.   Abdominal: Soft. Bowel sounds are normal..   Musculoskeletal: No edema or deformity. No clubbing  Neurological: Alert and oriented to person, place, and time.   Skin: Skin is warm. No rash noted. No erythema.   Psychiatric: Normal mood and affect. Judgment normal.     Labs:     Lab Results   Component Value Date    HGBA1C 5.6 06/26/2021    HGBA1C 5.5 07/01/2019    CREATININE 0.7 06/26/2021    K 4.5 06/26/2021     Lab Results   Component Value Date    TSH 1.75 06/26/2021    TSH 1.71 07/01/2019    TRIG 41 06/26/2021    TRIG 48 07/01/2019    CHOL 187 06/26/2021    CHOL 214 (H) 07/01/2019    ALT 24 06/26/2021           Images:   7/2018    The DEXA was performed on [employer] [facility] equipment.     AP lumbar spine T-score:      L1-L4       -0.3  Corresponding bone mineral density:       1.152 g/cm2.     AP left hip lowest T-score:    Femoral neck      -1.8  Corresponding bone mineral density:       0.792 g/cm2.       7/2021  The DEXA was performed on [employer] equipment.     AP lumbar spine T-score       L1-L4                         -0.7  Corresponding bone mineral density:       1.104 g/cm2.  There has been a 4.2% worsening since the previous study.     AP left hip lowest T-score:    Femoral neck      -1.9  Corresponding bone mineral density:       0.768 g/cm2.  There has been no significant change of the total hip since the previous study.     AP right hip was not performed secondary to a history of surgery.     ASSESSMENT:/Plan:    1, Osteoporosis  - evidenced by fragility frx in 2018  - DXEA 2021 showing mild worsening of BMD    - risk factors: old age, post menopause female, low vit D, relatively low BMI.   - will check lab and discuss treatment after lab.   Theoretically starting with bone formation agent followed by anti resorptive agent is the most potent treatment. Information about medication given to the patient. Will discuss further after lab.     Orders Placed This  Encounter   Procedures   • Vitamin D 25 hydroxy     Order Specific Question:   Release to patient     Answer:   Immediate   • PTH, intact     Order Specific Question:   Release to patient     Answer:   Immediate   • Creatinine, urine, 24 hour     Order Specific Question:   Release to patient     Answer:   Immediate   • Calcium, urine, 24 hour     Order Specific Question:   Release to patient     Answer:   Immediate   • Basic metabolic panel     Order Specific Question:   Release to patient     Answer:   Immediate         RTC in 6 months    Time Spent  I spent 45 minutes on this date of service performing the following activities: obtaining history, performing examination, entering orders, documenting, preparing for visit, obtaining / reviewing records, providing counseling and education, independently reviewing study/studies, communicating results, and coordinating care.      Thank you very much for allowing me participating in the patient's care. Please feel free to contact me if any questions.     Electronically signed by Jacque Pryor MD

## 2021-10-25 NOTE — LETTER
2021     Monesrrat Gonsalez MD  [address]  4th Floor  [facility] [place] [address]    Patient: Giovanna Noble  YOB: 1960  Date of Visit: 10/25/2021      Dear Dr. Gonsalez:    Thank you for referring Giovanna Noble to me for evaluation. Below are my notes for this consultation.    If you have questions, please do not hesitate to call me. I look forward to following your patient along with you.         Sincerely,        Jacque Pryor MD        CC: No Jacque Johnson MD  10/27/2021  7:34 PM  Signed         Endocrinology Consultation Report     Patient Name: Giovanna Noble  MR#: [id number]  : 1960  Consultant: Jacque Pryor MD      Giovanna Noble is a 61 y.o. female who presents for evaluation for osteoporosis.     HPI:  Patient had a fall from standing position in 2018.  Had R hip and wrist frx. S/p ORIF.  Patient is here for osteoporosis evaluation.     History:   - risk factors:    PHPT: no hypercalcemia. No renal stone.    thyroid problems: none   RA or DM:none   steroids use history: none   early menopause: menopause around age 59.  2019 partial hysterectomy due to abn pap smear.    Physically activity/fall risk:  twice a week. Riding bikes 1-2 times a month. Doing yoga frequently. Walking often.  swimming    Parent hip frx history:none   Diet history: Was on plant based diet before.  Now eating regular diet. No red meat, fish. Occasionally chicken. Not much dairy product. Eating yogurt.    Smoking/alcohol history: none.    Medications: denies heparin, warfarin, PPI, antiseizure medication, cyclosporine, cancer drugs, aromatase inhibitors etc.   - Fracture history   Fragility frx: R hip and wrist frx 2018   Non fragility frx: none  - treatment history:    Calcium supplement: none   Vitamin D supplement: took vit D 50,000 units for 3 months before Sept.    Anti-resorption      Medical History:  Past Medical History:   Diagnosis Date   • Abnormal Pap smear of cervix    • Acne    •  Arthritis    • Breast cancer screening 2018    birads 1   • Closed fracture of neck of right femur (CMS/HCC) 2018    Added automatically from request for surgery [id number]   • Closed fracture of right wrist 2018   • Depression      a year ago   • History of varicella    • Keratoconus, stable     Carlos Weeks   • Osteopenia 2018   • Pap smear for cervical cancer screening 2018    abnormal pap, hpv abnormal   • Papanicolaou smear of anus with atypical squamous cells of undetermined significance (ASC-US) 9/10/2018    Added automatically from request for surgery [id number]   • Rosacea        Surgical History:   Past Surgical History:   Procedure Laterality Date   •  SECTION     • DILATION AND CURETTAGE, DIAGNOSTIC / THERAPEUTIC     • FRACTURE SURGERY      right hip   • FRACTURE SURGERY      right wrist   • HIP SURGERY  2018   • HYSTERECTOMY      ovaries remain       Family History:  Family History   Problem Relation Age of Onset   • Hypertension Biological Mother    • Glaucoma Biological Mother    • Dementia Biological Mother    • Stroke Biological Father    • Multiple sclerosis Biological Father    • Hypertension Biological Father    • Heart attack Biological Father         mid-50's   • Thyroid cancer Biological Sister    • Alcohol abuse Biological Son    • No Known Problems Biological Brother    • Breast cancer Neg Hx    • Colon cancer Neg Hx        Social history:  Social History     Tobacco Use   • Smoking status: Never Smoker   • Smokeless tobacco: Never Used   Vaping Use   • Vaping Use: Never used   Substance Use Topics   • Alcohol use: Yes     Comment: on occasion   • Drug use: No       Medication(active prior to today):  No current outpatient medications on file.     No current facility-administered medications for this visit.       Allergies:  Patient has no known allergies.    ROS:  All other systems reviewed and negative except as noted in HPI    PE:  Vitals:    10/25/21  "1013   BP: 130/80   BP Location: Right upper arm   Patient Position: Sitting   Pulse: 62   SpO2: 99%   Weight: 56.2 kg (124 lb)   Height: 1.702 m (5' 7\")     Body mass index is 19.42 kg/m².    Constitutional: well-developed. well- nourished  HENT: Head: Normocephalic and atraumatic.   Eyes: Conjunctivae and EOM are normal. PERRL  Neck: Normal range of motion. Neck supple. No JVD. No thyromegaly present.   Cardiovascular: Normal rate, regular rhythm and normal heart sounds.    Pulmonary/Chest: Effort normal and breath sounds normal.   Abdominal: Soft. Bowel sounds are normal..   Musculoskeletal: No edema or deformity. No clubbing  Neurological: Alert and oriented to person, place, and time.   Skin: Skin is warm. No rash noted. No erythema.   Psychiatric: Normal mood and affect. Judgment normal.     Labs:     Lab Results   Component Value Date    HGBA1C 5.6 06/26/2021    HGBA1C 5.5 07/01/2019    CREATININE 0.7 06/26/2021    K 4.5 06/26/2021     Lab Results   Component Value Date    TSH 1.75 06/26/2021    TSH 1.71 07/01/2019    TRIG 41 06/26/2021    TRIG 48 07/01/2019    CHOL 187 06/26/2021    CHOL 214 (H) 07/01/2019    ALT 24 06/26/2021           Images:   7/2018    The DEXA was performed on [employer] ADV[facility] equipment.     AP lumbar spine T-score:      L1-L4       -0.3  Corresponding bone mineral density:       1.152 g/cm2.     AP left hip lowest T-score:    Femoral neck      -1.8  Corresponding bone mineral density:       0.792 g/cm2.       7/2021  The DEXA was performed on [employer] equipment.     AP lumbar spine T-score       L1-L4                         -0.7  Corresponding bone mineral density:       1.104 g/cm2.  There has been a 4.2% worsening since the previous study.     AP left hip lowest T-score:    Femoral neck      -1.9  Corresponding bone mineral density:       0.768 g/cm2.  There has been no significant change of the total hip since the previous study.     AP right hip was not " performed secondary to a history of surgery.     ASSESSMENT:/Plan:    1, Osteoporosis  - evidenced by fragility frx in 2018  - DXEA 2021 showing mild worsening of BMD    - risk factors: old age, post menopause female, low vit D, relatively low BMI.   - will check lab and discuss treatment after lab.   Theoretically starting with bone formation agent followed by anti resorptive agent is the most potent treatment. Information about medication given to the patient. Will discuss further after lab.     Orders Placed This Encounter   Procedures   • Vitamin D 25 hydroxy     Order Specific Question:   Release to patient     Answer:   Immediate   • PTH, intact     Order Specific Question:   Release to patient     Answer:   Immediate   • Creatinine, urine, 24 hour     Order Specific Question:   Release to patient     Answer:   Immediate   • Calcium, urine, 24 hour     Order Specific Question:   Release to patient     Answer:   Immediate   • Basic metabolic panel     Order Specific Question:   Release to patient     Answer:   Immediate         RTC in 6 months    Time Spent  I spent 45 minutes on this date of service performing the following activities: obtaining history, performing examination, entering orders, documenting, preparing for visit, obtaining / reviewing records, providing counseling and education, independently reviewing study/studies, communicating results, and coordinating care.      Thank you very much for allowing me participating in the patient's care. Please feel free to contact me if any questions.     Electronically signed by Jacque Pryor MD

## 2021-11-11 ENCOUNTER — APPOINTMENT (OUTPATIENT)
Dept: LAB | Facility: HOSPITAL | Age: 61
End: 2021-11-11
Attending: INTERNAL MEDICINE
Payer: COMMERCIAL

## 2021-11-11 DIAGNOSIS — Z00.00 ROUTINE GENERAL MEDICAL EXAMINATION AT A HEALTH CARE FACILITY: Primary | ICD-10-CM

## 2021-11-11 LAB
CALCIUM 24H UR-MRATE: 128 MG/24 H (ref 100–300)
COLLECT DURATION TIME UR: 24 H
COLLECT DURATION TIME UR: 24 H
CREAT 24H UR-MRATE: 0.94 G/24 H (ref 0.6–1.8)
SPECIMEN VOL 24H UR: 1050 ML
SPECIMEN VOL 24H UR: 1050 ML

## 2021-11-11 PROCEDURE — 82570 ASSAY OF URINE CREATININE: CPT

## 2021-11-11 PROCEDURE — 82340 ASSAY OF CALCIUM IN URINE: CPT

## 2021-12-16 ENCOUNTER — TELEMEDICINE (OUTPATIENT)
Dept: ENDOCRINOLOGY | Facility: CLINIC | Age: 61
End: 2021-12-16
Payer: COMMERCIAL

## 2021-12-16 DIAGNOSIS — S72.001S CLOSED FRACTURE OF RIGHT HIP, SEQUELA: ICD-10-CM

## 2021-12-16 DIAGNOSIS — M81.0 OSTEOPOROSIS WITHOUT CURRENT PATHOLOGICAL FRACTURE, UNSPECIFIED OSTEOPOROSIS TYPE: Primary | ICD-10-CM

## 2021-12-16 PROCEDURE — 99214 OFFICE O/P EST MOD 30 MIN: CPT | Mod: 95 | Performed by: INTERNAL MEDICINE

## 2021-12-16 RX ORDER — PSYLLIUM HUSK 0.4 G
1 CAPSULE ORAL 2 TIMES DAILY WITH MEALS
Qty: 180 TABLET | Refills: 3
Start: 2021-12-16 | End: 2024-06-28 | Stop reason: SDUPTHER

## 2021-12-16 RX ORDER — CHOLECALCIFEROL (VITAMIN D3) 50 MCG
2000 TABLET ORAL DAILY
Start: 2021-12-16 | End: 2022-12-16

## 2021-12-16 NOTE — PROGRESS NOTES
Verification of Patient Location:  The patient affirms they are currently located in the following state: [place]    Request for Consent:    Audio and Video Encounter   Jasmyn, my name is Jacque Pryor MD.  Before we proceed, can you please verify your identification by telling me your full name and date of birth?  Can you tell me who is in the room with you?    You and I are about to have a telemedicine check-in or visit because you have requested it.  This is a live video-conference.  I am a real person, speaking to you in real time.  There is no one else with me on the video-conference.  However, when we use ([employer], [employer], etc) it is important for you to know that the video-conference may not be secure or private.  I am not recording this conversation and I am asking you not to record it.  This telemedicine visit will be billed to your health insurance or you, if you are self-insured.  You understand you will be responsible for any copayments or coinsurances that apply to your telemedicine visit.  Communication platform used for this encounter:  [employer]     Before starting our telemedicine visit, I am required to get your consent for this virtual check-in or visit by telemedicine. Do you consent?      Patient Response to Request for Consent:  Yes      Visit Documentation:         Endocrinology Report     Giovanna Noble is a 61 y.o. female who presents for follow up of     1 osteoporosis.       History:   - Patient had a fall from standing position in 2018.  Had R hip and wrist frx. S/p ORIF.      History:   - risk factors:    PHPT: no hypercalcemia. No renal stone.    thyroid problems: none   RA or DM:none   steroids use history: none   menopause: menopause around age 59.  2/2019 partial hysterectomy due to abn pap smear.    Physically activity/fall risk:  twice a week. Riding bikes 1-2 times a month. Doing yoga frequently. Walking often.  swimming    Parent hip frx history:none   Diet history: Was on  plant based diet before.  Now eating regular diet. No red meat, fish. Occasionally chicken. Not much dairy product. Eating yogurt.    Smoking/alcohol history: none.    Medications: denies heparin, warfarin, PPI, antiseizure medication, cyclosporine, cancer drugs, aromatase inhibitors etc.   - Fracture history   Fragility frx: R hip and wrist frx 2018   Non fragility frx: none  - treatment history:    Calcium supplement: none   Vitamin D supplement: took vit D 50,000 units for 3 months before Sept.    Anti-resorption: none    Last visit: 10/25/2021  Intervals:   - lab showing good results for PTH, vit D, renal function, normal 24 hour urine calcium. No secondary causes identified.   - patient is taking calcium and vit D supplement now.     - she has reviewed information from last visit. Agreed to do anabolic treatment followed by anti resorptive agent.       Medical History:  Past Medical History:   Diagnosis Date   • Abnormal Pap smear of cervix    • Acne    • Arthritis    • Breast cancer screening 2018    birads 1   • Closed fracture of neck of right femur (CMS/HCC) 2018    Added automatically from request for surgery [id number]   • Closed fracture of right wrist 2018   • Depression      a year ago   • History of varicella    • Keratoconus, stable     Carlos Weeks   • Osteopenia 2018   • Pap smear for cervical cancer screening 2018    abnormal pap, hpv abnormal   • Papanicolaou smear of anus with atypical squamous cells of undetermined significance (ASC-US) 9/10/2018    Added automatically from request for surgery [id number]   • Rosacea        Surgical History:   Past Surgical History:   Procedure Laterality Date   •  SECTION     • DILATION AND CURETTAGE, DIAGNOSTIC / THERAPEUTIC     • FRACTURE SURGERY      right hip   • FRACTURE SURGERY      right wrist   • HIP SURGERY  2018   • HYSTERECTOMY      ovaries remain       Family History:  Family History   Problem Relation Age of Onset    • Hypertension Biological Mother    • Glaucoma Biological Mother    • Dementia Biological Mother    • Stroke Biological Father    • Multiple sclerosis Biological Father    • Hypertension Biological Father    • Heart attack Biological Father         mid-50's   • Thyroid cancer Biological Sister    • Alcohol abuse Biological Son    • No Known Problems Biological Brother    • Breast cancer Neg Hx    • Colon cancer Neg Hx        Social history:  Social History     Tobacco Use   • Smoking status: Never Smoker   • Smokeless tobacco: Never Used   Vaping Use   • Vaping Use: Never used   Substance Use Topics   • Alcohol use: Yes     Comment: on occasion   • Drug use: No       Medication(active prior to today):  Current Outpatient Medications   Medication Sig Dispense Refill   • calcium carbonate-vitamin D3 (Os-Joel 500 + D3) 500 mg(1,250mg) -200 unit per tablet Take 1 tablet by mouth 2 (two) times a day with meals. 180 tablet 3   • cholecalciferol, vitamin D3, 50 mcg (2,000 unit) tablet Take 2,000 Units by mouth daily.       No current facility-administered medications for this visit.       Allergies:  Patient has no known allergies.    ROS:  All other systems reviewed and negative except as noted in HPI    Labs:     Lab Results   Component Value Date    HGBA1C 5.6 06/26/2021    HGBA1C 5.5 07/01/2019    CREATININE 0.7 10/25/2021    K 4.5 10/25/2021     Lab Results   Component Value Date    TSH 1.75 06/26/2021    TSH 1.71 07/01/2019    TRIG 41 06/26/2021    TRIG 48 07/01/2019    CHOL 187 06/26/2021    CHOL 214 (H) 07/01/2019    ALT 24 06/26/2021           Results for SHEMAR GARCIAS (MRN [id number]) as of 12/16/2021 12:28   Ref. Range 10/25/2021 12:01 10/25/2021 12:01 11/11/2021 12:51 11/11/2021 12:51   Sodium Latest Ref Range: 136 - 144 mEQ/L 140      Potassium Latest Ref Range: 3.6 - 5.1 mEQ/L 4.5      Chloride Latest Ref Range: 98 - 109 mEQ/L 103      CO2 Latest Ref Range: 22 - 32 mEQ/L 26      BUN Latest Ref Range: 8 -  20 mg/dL 13      Creatinine Latest Ref Range: 0.6 - 1.1 mg/dL 0.7      Glucose Latest Ref Range: 70 - 99 mg/dL 85      Calcium Latest Ref Range: 8.9 - 10.3 mg/dL 9.6 9.6     eGFR Latest Ref Range: >=60.0 mL/min/1.73m*2 >60.0      Anion Gap Latest Ref Range: 3 - 15 mEQ/L 11      PTH Latest Ref Range: 12.0 - 88.0 pg/mL 55.3      Vit D, 25-Hydroxy Latest Ref Range: 30 - 100 ng/mL 34      Calcium, Ur, 24H Latest Ref Range: 100 - 300 mg/24 H   128    Creatinine, 24H Ur Latest Ref Range: 0.60 - 1.80 g/24 H   0.94    Urine Volume Latest Units: mL   1,050 1,050   Collection Interval, Ur Latest Units: H   24 24     Images:   7/2018    The DEXA was performed on [employer] equipment.     AP lumbar spine T-score:      L1-L4       -0.3  Corresponding bone mineral density:       1.152 g/cm2.     AP left hip lowest T-score:    Femoral neck      -1.8  Corresponding bone mineral density:       0.792 g/cm2.       7/2021  The DEXA was performed on [employer] equipment.     AP lumbar spine T-score       L1-L4                         -0.7  Corresponding bone mineral density:       1.104 g/cm2.  There has been a 4.2% worsening since the previous study.     AP left hip lowest T-score:    Femoral neck      -1.9  Corresponding bone mineral density:       0.768 g/cm2.  There has been no significant change of the total hip since the previous study.     AP right hip was not performed secondary to a history of surgery.     ASSESSMENT:/Plan:    1, Osteoporosis  - evidenced by fragility frx in 2018  - DXEA 2021 showing mild worsening of BMD    - risk factors: old age, post menopause female, history of low vit D (corrected), relatively low BMI.   - no secondary causes identified.     - patient is young. Had fragility frx in 2018. Would like to maximize treatment benefit with anabolic agents followed by antiresorptive agents.    She has no cardiovascular risk factors. No contraindication for either Evenity or PTH related  peptide.   Would like to apply for Rosumamab due to its superior efficacy. If this is denied, we will try apply for Tymlos.  Patient agreed with plan.     - continue calcium and vit D supplement  - fall precaution  - encourage to continue weight bearing exercises.       RTC in 6 months      Thank you very much for allowing me participating in the patient's care. Please feel free to contact me if any questions.     Electronically signed by Jacque Pryor MD      Time Spent:  I spent 25 minutes on this date of service performing the following activities: obtaining history, entering orders, preparing for visit, providing counseling and education and communicating results.

## 2021-12-16 NOTE — LETTER
December 21, 2021     Monserrat Gonsalez MD  [address] [facility]  4th Floor  [facility] [address]    Patient: Giovanna Noble  YOB: 1960  Date of Visit: 12/16/2021      Dear Dr. Gonsalez:    Thank you for referring Giovanna Noble to me for evaluation. Below are my notes for this consultation.    If you have questions, please do not hesitate to call me. I look forward to following your patient along with you.         Sincerely,        Jacque Pryor MD        CC: No Recipients  Jacque Pryor MD  12/21/2021 12:10 AM  Signed  Verification of Patient Location:  The patient affirms they are currently located in the following state: [place]    Request for Consent:    Audio and Video Encounter   Jasmyn, my name is Jacque Pryor MD.  Before we proceed, can you please verify your identification by telling me your full name and date of birth?  Can you tell me who is in the room with you?    You and I are about to have a telemedicine check-in or visit because you have requested it.  This is a live video-conference.  I am a real person, speaking to you in real time.  There is no one else with me on the video-conference.  However, when we use ([employer], [employer], etc) it is important for you to know that the video-conference may not be secure or private.  I am not recording this conversation and I am asking you not to record it.  This telemedicine visit will be billed to your health insurance or you, if you are self-insured.  You understand you will be responsible for any copayments or coinsurances that apply to your telemedicine visit.  Communication platform used for this encounter:  Doximity     Before starting our telemedicine visit, I am required to get your consent for this virtual check-in or visit by telemedicine. Do you consent?      Patient Response to Request for Consent:  Yes      Visit Documentation:         Endocrinology Report     Giovanna Noble is a 61 y.o. female who presents for follow up of     1 osteoporosis.        History:   - Patient had a fall from standing position in 2018.  Had R hip and wrist frx. S/p ORIF.      History:   - risk factors:    PHPT: no hypercalcemia. No renal stone.    thyroid problems: none   RA or DM:none   steroids use history: none   menopause: menopause around age 59.  2/2019 partial hysterectomy due to abn pap smear.    Physically activity/fall risk:  twice a week. Riding bikes 1-2 times a month. Doing yoga frequently. Walking often.  swimming    Parent hip frx history:none   Diet history: Was on plant based diet before.  Now eating regular diet. No red meat, fish. Occasionally chicken. Not much dairy product. Eating yogurt.    Smoking/alcohol history: none.    Medications: denies heparin, warfarin, PPI, antiseizure medication, cyclosporine, cancer drugs, aromatase inhibitors etc.   - Fracture history   Fragility frx: R hip and wrist frx 2018   Non fragility frx: none  - treatment history:    Calcium supplement: none   Vitamin D supplement: took vit D 50,000 units for 3 months before Sept.    Anti-resorption: none    Last visit: 10/25/2021  Intervals:   - lab showing good results for PTH, vit D, renal function, normal 24 hour urine calcium. No secondary causes identified.   - patient is taking calcium and vit D supplement now.     - she has reviewed information from last visit. Agreed to do anabolic treatment followed by anti resorptive agent.       Medical History:  Past Medical History:   Diagnosis Date   • Abnormal Pap smear of cervix    • Acne    • Arthritis    • Breast cancer screening 07/05/2018    birads 1   • Closed fracture of neck of right femur (CMS/HCC) 5/5/2018    Added automatically from request for surgery [id number]   • Closed fracture of right wrist 5/6/2018   • Depression      a year ago   • History of varicella    • Keratoconus, stable     Carlos Weeks   • Osteopenia 07/2018   • Pap smear for cervical cancer screening 06/25/2018    abnormal pap, hpv abnormal   •  Papanicolaou smear of anus with atypical squamous cells of undetermined significance (ASC-US) 9/10/2018    Added automatically from request for surgery [id number]   • Rosacea        Surgical History:   Past Surgical History:   Procedure Laterality Date   •  SECTION     • DILATION AND CURETTAGE, DIAGNOSTIC / THERAPEUTIC     • FRACTURE SURGERY      right hip   • FRACTURE SURGERY      right wrist   • HIP SURGERY  2018   • HYSTERECTOMY      ovaries remain       Family History:  Family History   Problem Relation Age of Onset   • Hypertension Biological Mother    • Glaucoma Biological Mother    • Dementia Biological Mother    • Stroke Biological Father    • Multiple sclerosis Biological Father    • Hypertension Biological Father    • Heart attack Biological Father         mid-50's   • Thyroid cancer Biological Sister    • Alcohol abuse Biological Son    • No Known Problems Biological Brother    • Breast cancer Neg Hx    • Colon cancer Neg Hx        Social history:  Social History     Tobacco Use   • Smoking status: Never Smoker   • Smokeless tobacco: Never Used   Vaping Use   • Vaping Use: Never used   Substance Use Topics   • Alcohol use: Yes     Comment: on occasion   • Drug use: No       Medication(active prior to today):  Current Outpatient Medications   Medication Sig Dispense Refill   • calcium carbonate-vitamin D3 (Os-Joel 500 + D3) 500 mg(1,250mg) -200 unit per tablet Take 1 tablet by mouth 2 (two) times a day with meals. 180 tablet 3   • cholecalciferol, vitamin D3, 50 mcg (2,000 unit) tablet Take 2,000 Units by mouth daily.       No current facility-administered medications for this visit.       Allergies:  Patient has no known allergies.    ROS:  All other systems reviewed and negative except as noted in HPI    Labs:     Lab Results   Component Value Date    HGBA1C 5.6 2021    HGBA1C 5.5 2019    CREATININE 0.7 10/25/2021    K 4.5 10/25/2021     Lab Results   Component Value Date    TSH 1.75  06/26/2021    TSH 1.71 07/01/2019    TRIG 41 06/26/2021    TRIG 48 07/01/2019    CHOL 187 06/26/2021    CHOL 214 (H) 07/01/2019    ALT 24 06/26/2021           Results for SHEMAR GARCIAS (MRN [id number]) as of 12/16/2021 12:28   Ref. Range 10/25/2021 12:01 10/25/2021 12:01 11/11/2021 12:51 11/11/2021 12:51   Sodium Latest Ref Range: 136 - 144 mEQ/L 140      Potassium Latest Ref Range: 3.6 - 5.1 mEQ/L 4.5      Chloride Latest Ref Range: 98 - 109 mEQ/L 103      CO2 Latest Ref Range: 22 - 32 mEQ/L 26      BUN Latest Ref Range: 8 - 20 mg/dL 13      Creatinine Latest Ref Range: 0.6 - 1.1 mg/dL 0.7      Glucose Latest Ref Range: 70 - 99 mg/dL 85      Calcium Latest Ref Range: 8.9 - 10.3 mg/dL 9.6 9.6     eGFR Latest Ref Range: >=60.0 mL/min/1.73m*2 >60.0      Anion Gap Latest Ref Range: 3 - 15 mEQ/L 11      PTH Latest Ref Range: 12.0 - 88.0 pg/mL 55.3      Vit D, 25-Hydroxy Latest Ref Range: 30 - 100 ng/mL 34      Calcium, Ur, 24H Latest Ref Range: 100 - 300 mg/24 H   128    Creatinine, 24H Ur Latest Ref Range: 0.60 - 1.80 g/24 H   0.94    Urine Volume Latest Units: mL   1,050 1,050   Collection Interval, Ur Latest Units: H   24 24     Images:   7/2018    The DEXA was performed on [employer] equipment.     AP lumbar spine T-score:      L1-L4       -0.3  Corresponding bone mineral density:       1.152 g/cm2.     AP left hip lowest T-score:    Femoral neck      -1.8  Corresponding bone mineral density:       0.792 g/cm2.       7/2021  The DEXA was performed on [employer] equipment.     AP lumbar spine T-score       L1-L4                         -0.7  Corresponding bone mineral density:       1.104 g/cm2.  There has been a 4.2% worsening since the previous study.     AP left hip lowest T-score:    Femoral neck      -1.9  Corresponding bone mineral density:       0.768 g/cm2.  There has been no significant change of the total hip since the previous study.     AP right hip was not performed  secondary to a history of surgery.     ASSESSMENT:/Plan:    1, Osteoporosis  - evidenced by fragility frx in 2018  - DXEA 2021 showing mild worsening of BMD    - risk factors: old age, post menopause female, history of low vit D (corrected), relatively low BMI.   - no secondary causes identified.     - patient is young. Had fragility frx in 2018. Would like to maximize treatment benefit with anabolic agents followed by antiresorptive agents.    She has no cardiovascular risk factors. No contraindication for either Evenity or PTH related peptide.   Would like to apply for Rosumamab due to its superior efficacy. If this is denied, we will try apply for Tymlos.  Patient agreed with plan.     - continue calcium and vit D supplement  - fall precaution  - encourage to continue weight bearing exercises.       RTC in 6 months      Thank you very much for allowing me participating in the patient's care. Please feel free to contact me if any questions.     Electronically signed by Jacque Pryor MD      Time Spent:  I spent 25 minutes on this date of service performing the following activities: obtaining history, entering orders, preparing for visit, providing counseling and education and communicating results.

## 2021-12-21 ENCOUNTER — TELEPHONE (OUTPATIENT)
Dept: ENDOCRINOLOGY | Facility: CLINIC | Age: 61
End: 2021-12-21
Payer: COMMERCIAL

## 2022-01-12 ENCOUNTER — TELEPHONE (OUTPATIENT)
Dept: ENDOCRINOLOGY | Facility: CLINIC | Age: 62
End: 2022-01-12
Payer: COMMERCIAL

## 2022-01-12 NOTE — TELEPHONE ENCOUNTER
Started [facility] ja[facility]/ [facility]  REF: [id number]  Per Hood  Information forwarded to pharmacist, turn around time is 72 hrs.    Started auth  Started for Buy/Bill.

## 2022-02-02 ENCOUNTER — TELEPHONE (OUTPATIENT)
Dept: ENDOCRINOLOGY | Facility: CLINIC | Age: 62
End: 2022-02-02
Payer: COMMERCIAL

## 2022-02-02 NOTE — TELEPHONE ENCOUNTER
Call [employer] assist to see if Evenity could be approved with Patient medical insurance.   Patient [employer] number ID number [id number]

## 2022-02-07 NOTE — TELEPHONE ENCOUNTER
From [facility].   Pt needs to pay $400 each month.     Pls process PA for  Tymlos.

## 2022-02-15 ENCOUNTER — TELEPHONE (OUTPATIENT)
Dept: ENDOCRINOLOGY | Facility: CLINIC | Age: 62
End: 2022-02-15
Payer: COMMERCIAL

## 2022-02-18 ENCOUNTER — TELEPHONE (OUTPATIENT)
Dept: ENDOCRINOLOGY | Facility: CLINIC | Age: 62
End: 2022-02-18
Payer: COMMERCIAL

## 2022-02-18 NOTE — TELEPHONE ENCOUNTER
Regarding Tymlos   On 2/15/2022 spoke with [employer] to explore options for Tymlos coverage , filled out and faxed Tymlos patient enrollment form on 2/16/22.   Today called the enrolment program to get update on potential coverage. Left message with patient . No return phone call .   Called [employer] to see why the Tymlos was denied .

## 2022-03-16 ENCOUNTER — TELEPHONE (OUTPATIENT)
Dept: ENDOCRINOLOGY | Facility: CLINIC | Age: 62
End: 2022-03-16
Payer: COMMERCIAL

## 2022-08-05 ENCOUNTER — OFFICE VISIT (OUTPATIENT)
Dept: INTERNAL MEDICINE | Facility: CLINIC | Age: 62
End: 2022-08-05
Payer: COMMERCIAL

## 2022-08-05 VITALS
SYSTOLIC BLOOD PRESSURE: 108 MMHG | BODY MASS INDEX: 19.3 KG/M2 | DIASTOLIC BLOOD PRESSURE: 70 MMHG | WEIGHT: 123 LBS | TEMPERATURE: 98 F | RESPIRATION RATE: 18 BRPM | HEART RATE: 66 BPM | OXYGEN SATURATION: 98 % | HEIGHT: 67 IN

## 2022-08-05 DIAGNOSIS — Z00.00 ENCOUNTER FOR ANNUAL PHYSICAL EXAM: Primary | ICD-10-CM

## 2022-08-05 DIAGNOSIS — M81.0 OSTEOPOROSIS WITHOUT CURRENT PATHOLOGICAL FRACTURE, UNSPECIFIED OSTEOPOROSIS TYPE: ICD-10-CM

## 2022-08-05 DIAGNOSIS — Z12.31 ENCOUNTER FOR SCREENING MAMMOGRAM FOR MALIGNANT NEOPLASM OF BREAST: ICD-10-CM

## 2022-08-05 PROBLEM — N20.0 KIDNEY STONE: Status: RESOLVED | Noted: 2017-07-10 | Resolved: 2022-08-05

## 2022-08-05 PROBLEM — N20.0 KIDNEY STONE: Status: ACTIVE | Noted: 2017-07-10

## 2022-08-05 PROCEDURE — 3008F BODY MASS INDEX DOCD: CPT | Performed by: INTERNAL MEDICINE

## 2022-08-05 PROCEDURE — 99396 PREV VISIT EST AGE 40-64: CPT | Performed by: INTERNAL MEDICINE

## 2022-08-05 ASSESSMENT — PATIENT HEALTH QUESTIONNAIRE - PHQ9: SUM OF ALL RESPONSES TO PHQ9 QUESTIONS 1 & 2: 0

## 2022-08-05 NOTE — PATIENT INSTRUCTIONS
Thank you for coming in today for your wellness exam.     The things we do on a daily basis can greatly impact our health. I recommend the following:  -- Eat a mostly plant-based Mediterranean diet. This includes the following:   -- fruits and vegetables    -- legumes    -- whole grains (ex: brown or wild rice, quinoa, farro, steel cut oats)   -- healthy fats: extra virgin olive oil, nuts, avocado    -- fatty fish   -- Limit your consumption of the following:   -- red meat and processed meats     -- cheese and butter    -- refined carbohydrates (ex: white bread, white rice)   -- processed foods   -- added and artificial sugar    -- trans fats and saturated fats   -- Stay hydrated by drinking water.  -- Exercise regularly.   -- 150 minutes of moderate-intensity aerobic activity (ex: brisk walking)   -- strengthening exercises at least 2 days per week   -- Do your best to get at least 7-8 hours of sleep each night. Sleep is very important to your overall health and wellbeing.   -- Limit your alcohol consumption: less than 2 drinks per day, less than 7 drinks per week  -- Do not smoke cigarettes or e-cigarettes. If you do, please do your best to cut back and quit all together. This is one of the best things that you can do for your overall health.   -- Dental exam every 6 months.  -- Eye exam annually.  -- Wear SPF daily and check your skin each month for new or changing lesions. You should have a routine skin exam every year with Dermatology.   -- Wear your seatbelt in the car and do not text while driving.     GETTING YOUR RESULTS   Please sign up for [employer] if you have not done so already. I will most likely contact you through [employer] to review your results. You should have your account set to notify you if a new message appears in your [employer] Voice[employer].     Your test results will be automatically released to you. Please allow me time to review your results and get back to you. If you do not hear from me within 1-2 weeks,  please reach out.

## 2022-08-05 NOTE — PROGRESS NOTES
Giovanna Noble is a 62 y.o. adult who presents today for     Chief Complaint   Patient presents with   • Annual Exam         SUBJECTIVE:  HPI    Patient presents today for annual wellness exam.  No specific concerns today.     Exercise  -  twice weekly  - yoga   - walking   - rides bike     Diet is overall well balanced.     No Known Allergies      Current Outpatient Medications:   •  alendronate (FOSAMAX) 70 mg tablet, Take 1 tablet (70 mg total) by mouth every (seven) 7 days. 1 tablet weekly on an empty stomach, remain upright for at least 30 minutes, Disp: 12 tablet, Rfl: 3  •  calcium carbonate-vitamin D3 (Os-Joel 500 + D3) 500 mg(1,250mg) -200 unit per tablet, Take 1 tablet by mouth 2 (two) times a day with meals., Disp: 180 tablet, Rfl: 3  •  cholecalciferol, vitamin D3, 50 mcg (2,000 unit) tablet, Take 2,000 Units by mouth daily., Disp: , Rfl:   •  Lactobac no.41/Bifidobact no.7 (PROBIOTIC-10 ORAL), Take by mouth daily., Disp: , Rfl:     Past Medical History:   Diagnosis Date   • Abnormal Pap smear of cervix    • Acne    • Arthritis    • Breast cancer screening 2018    birads 1   • Closed fracture of neck of right femur (CMS/HCC) 2018    Added automatically from request for surgery [id number]   • Closed fracture of right wrist 2018   • Depression      a year ago   • History of varicella    • Keratoconus, stable     Carlos Weeks   • Osteopenia 2018   • Pap smear for cervical cancer screening 2018    abnormal pap, hpv abnormal   • Papanicolaou smear of anus with atypical squamous cells of undetermined significance (ASC-US) 9/10/2018    Added automatically from request for surgery [id number]   • Rosacea        Past Surgical History:   Procedure Laterality Date   •  SECTION     • DILATION AND CURETTAGE, DIAGNOSTIC / THERAPEUTIC     • FRACTURE SURGERY      right hip   • FRACTURE SURGERY      right wrist   • HIP SURGERY  2018   • HYSTERECTOMY      ovaries remain        Family History   Problem Relation Age of Onset   • Hypertension Biological Mother    • Glaucoma Biological Mother    • Dementia Biological Mother    • Stroke Biological Father    • Multiple sclerosis Biological Father    • Hypertension Biological Father    • Heart attack Biological Father         mid-50's   • Thyroid cancer Biological Sister    • Alcohol abuse Biological Son    • No Known Problems Biological Brother    • Breast cancer Neg Hx    • Colon cancer Neg Hx        Social History     Socioeconomic History   • Marital status:      Spouse name: Not on file   • Number of children: Not on file   • Years of education: Not on file   • Highest education level: Not on file   Occupational History   • Occupation: works at [employer]   Tobacco Use   • Smoking status: Never Smoker   • Smokeless tobacco: Never Used   Vaping Use   • Vaping Use: Never used   Substance and Sexual Activity   • Alcohol use: Yes     Comment: on occasion   • Drug use: No   • Sexual activity: Yes     Partners: Male     Birth control/protection: Post-menopausal   Other Topics Concern   • Not on file   Social History Narrative    Lives with .      Exercise: swimming and yoga, high NEAT (work is active)    Sleep: 7 hours    Nutrition: Getting purple carrot (vegan mail) 3 times a week, plus salads     Social Determinants of Health     Financial Resource Strain: Not on file   Food Insecurity: Not on file   Transportation Needs: Not on file   Physical Activity: Not on file   Stress: Not on file   Social Connections: Not on file   Intimate Partner Violence: Not on file   Housing Stability: Not on file       ROS   Constitutional: no unintentional weight changes  HENT: no nasal congestion, post-nasal drip   Eyes: no recent changes in vision   Cardiac: no chest pain or palpitations  Respiratory: no shortness of breath   GI: no abdominal pain, diarrhea or constipation. No blood in stool  : urinary frequency - mostly when she leaves  "the house, nocturia x1, no incontinence  Msk: no significant joint pain  Neuro: no headaches, dizziness or lightheadedness  Skin: no rashes or new lesions     OBJECTIVE:  Vitals:    08/05/22 1313   BP: 108/70   BP Location: Left upper arm   Patient Position: Sitting   Pulse: 66   Resp: 18   Temp: 36.7 °C (98 °F)   TempSrc: Temporal   SpO2: 98%   Weight: 55.8 kg (123 lb)   Height: 1.702 m (5' 7\")       Wt Readings from Last 3 Encounters:   08/05/22 55.8 kg (123 lb)   10/25/21 56.2 kg (124 lb)   06/14/21 54 kg (119 lb)     Body mass index is 19.26 kg/m².      Physical Exam  Constitutional: well-appearing adult in no acute distress    Eyes: PERRL, EOMI, conjunctiva normal   HENT: nares normal, TM with +light reflex bilaterally, external ear canals normal, oral mucosa moist   Neck: no cervical lymphadenopathy   Cardiac: RRR, no murmur, pulses equal in all extremities   Respiratory: lungs clear to auscultation bilaterally, no wheezing/rales/rhonchi  Abdomen: soft, non-tender, non-distended. bowel sounds normal   Msk: no lower extremity edema   Neuro: no focal neurological deficit  Skin: warm, dry       ASSESSMENT & PLAN:   Diagnoses and all orders for this visit:    Encounter for annual physical exam (Primary)  Assessment & Plan:  Patient presents today for her annual well exam.   Discussed healthy lifestyle.     Age-appropriate cancer screening  S/P Hysterectomy  Mammogram ordered   Colonoscopy completed in 2015, repeat in 10 years  Recommend annual skin check w/ derm    Immunizations   Recommend Flu shot in the Fall   Shingrix #2 in 2-6 months     Immunization History   Administered Date(s) Administered   • Hepatitis A 07/01/2019   • Influenza TIV (IM) 01/01/2009   • Influenza Vaccine Quadrivalent Preservative Free 6-35 Months 10/11/2013   • Influenza, Unspecified 10/31/2018, 09/25/2021   • Sars-cov-2 (Covid-19) Vaccine, Moderna 01/13/2021, 02/10/2021, 10/30/2021, 07/15/2022   • Tdap 02/28/2017   • Zoster Vaccine " Recombinant Adjuvanted (Shingrix) 07/15/2022       Health Maintenance Due   Topic Date Due   • HIV Screening  Never done   • Influenza Vaccine (1) 08/01/2022       Orders:  -     CBC and Differential; Future  -     Comprehensive metabolic panel; Future  -     Hemoglobin A1c; Future  -     Lipid panel; Future  -     Vitamin D 25 hydroxy; Future    Osteoporosis without current pathological fracture, unspecified osteoporosis type  Assessment & Plan:  Treatment: Fosamax (2/2022)    Orders:  -     Vitamin D 25 hydroxy; Future    Encounter for screening mammogram for malignant neoplasm of breast  -     BI SCREENING MAMMOGRAM BILATERAL(TOMOSYNTHESIS); Future

## 2022-08-05 NOTE — ASSESSMENT & PLAN NOTE
Patient presents today for her annual well exam.   Discussed healthy lifestyle.     Age-appropriate cancer screening  S/P Hysterectomy  Mammogram ordered   Colonoscopy completed in 2015, repeat in 10 years  Recommend annual skin check w/ derm    Immunizations   Recommend Flu shot in the Fall   Shingrix #2 in 2-6 months     Immunization History   Administered Date(s) Administered   • Hepatitis A 07/01/2019   • Influenza TIV (IM) 01/01/2009   • Influenza Vaccine Quadrivalent Preservative Free 6-35 Months 10/11/2013   • Influenza, Unspecified 10/31/2018, 09/25/2021   • Sars-cov-2 (Covid-19) Vaccine, Moderna 01/13/2021, 02/10/2021, 10/30/2021, 07/15/2022   • Tdap 02/28/2017   • Zoster Vaccine Recombinant Adjuvanted (Shingrix) 07/15/2022       Health Maintenance Due   Topic Date Due   • HIV Screening  Never done   • Influenza Vaccine (1) 08/01/2022

## 2022-08-22 ENCOUNTER — HOSPITAL ENCOUNTER (OUTPATIENT)
Dept: RADIOLOGY | Age: 62
Discharge: HOME | End: 2022-08-22
Attending: INTERNAL MEDICINE
Payer: COMMERCIAL

## 2022-08-22 DIAGNOSIS — Z12.31 ENCOUNTER FOR SCREENING MAMMOGRAM FOR MALIGNANT NEOPLASM OF BREAST: ICD-10-CM

## 2022-08-22 PROCEDURE — 77063 BREAST TOMOSYNTHESIS BI: CPT

## 2022-08-23 ENCOUNTER — TELEPHONE (OUTPATIENT)
Dept: ENDOCRINOLOGY | Facility: CLINIC | Age: 62
End: 2022-08-23
Payer: COMMERCIAL

## 2022-08-23 NOTE — TELEPHONE ENCOUNTER
[employer] rep inquired about pts insurance status for Prolia.  Pt recently changed insurances. RN called pt to  verify insurance. Per pt, pt is currently taking Fosamax.  Labs ordered by PCP and pt will have labs drawn and call office to schedule an appointment with Dr Pryor.  RN let pt know that RN would send message to Dr Pryor about possible Prolia prior auth with new insurance but also stated that Dr PRYOR may want to discuss at next appointment.

## 2022-09-15 LAB
25(OH)D3+25(OH)D2 SERPL-MCNC: 32.9 NG/ML (ref 30–100)
ALBUMIN SERPL-MCNC: 4.7 G/DL (ref 3.8–4.9)
ALBUMIN/GLOB SERPL: 2.1 {RATIO} (ref 1.2–2.2)
ALP SERPL-CCNC: 56 IU/L (ref 44–121)
ALT SERPL-CCNC: 13 IU/L (ref 0–32)
AST SERPL-CCNC: 23 IU/L (ref 0–40)
BASOPHILS # BLD AUTO: 0 X10E3/UL (ref 0–0.2)
BASOPHILS NFR BLD AUTO: 1 %
BILIRUB SERPL-MCNC: 0.6 MG/DL (ref 0–1.2)
BUN SERPL-MCNC: 15 MG/DL (ref 8–27)
BUN/CREAT SERPL: 19 (ref 12–28)
CALCIUM SERPL-MCNC: 9.6 MG/DL (ref 8.7–10.3)
CHLORIDE SERPL-SCNC: 104 MMOL/L (ref 96–106)
CHOLEST SERPL-MCNC: 203 MG/DL (ref 100–199)
CO2 SERPL-SCNC: 25 MMOL/L (ref 20–29)
CREAT SERPL-MCNC: 0.79 MG/DL (ref 0.57–1)
EGFRCR-CYS SERPLBLD CKD-EPI 2021: 86 ML/MIN/1.73
EOSINOPHIL # BLD AUTO: 0.1 X10E3/UL (ref 0–0.4)
EOSINOPHIL NFR BLD AUTO: 2 %
ERYTHROCYTE [DISTWIDTH] IN BLOOD BY AUTOMATED COUNT: 12.6 % (ref 11.7–15.4)
GLOBULIN SER CALC-MCNC: 2.2 G/DL (ref 1.5–4.5)
GLUCOSE SERPL-MCNC: 87 MG/DL (ref 65–99)
HBA1C MFR BLD: 5.5 % (ref 4.8–5.6)
HCT VFR BLD AUTO: 41.3 % (ref 34–46.6)
HDLC SERPL-MCNC: 68 MG/DL
HGB BLD-MCNC: 13.8 G/DL (ref 11.1–15.9)
IMM GRANULOCYTES # BLD AUTO: 0 X10E3/UL (ref 0–0.1)
IMM GRANULOCYTES NFR BLD AUTO: 0 %
LDLC SERPL CALC-MCNC: 125 MG/DL (ref 0–99)
LYMPHOCYTES # BLD AUTO: 2.1 X10E3/UL (ref 0.7–3.1)
LYMPHOCYTES NFR BLD AUTO: 36 %
MCH RBC QN AUTO: 30.7 PG (ref 26.6–33)
MCHC RBC AUTO-ENTMCNC: 33.4 G/DL (ref 31.5–35.7)
MCV RBC AUTO: 92 FL (ref 79–97)
MONOCYTES # BLD AUTO: 0.6 X10E3/UL (ref 0.1–0.9)
MONOCYTES NFR BLD AUTO: 11 %
NEUTROPHILS # BLD AUTO: 2.9 X10E3/UL (ref 1.4–7)
NEUTROPHILS NFR BLD AUTO: 50 %
PLATELET # BLD AUTO: 213 X10E3/UL (ref 150–450)
POTASSIUM SERPL-SCNC: 4.3 MMOL/L (ref 3.5–5.2)
PROT SERPL-MCNC: 6.9 G/DL (ref 6–8.5)
RBC # BLD AUTO: 4.49 X10E6/UL (ref 3.77–5.28)
SODIUM SERPL-SCNC: 143 MMOL/L (ref 134–144)
TRIGL SERPL-MCNC: 57 MG/DL (ref 0–149)
VLDLC SERPL CALC-MCNC: 10 MG/DL (ref 5–40)
WBC # BLD AUTO: 5.8 X10E3/UL (ref 3.4–10.8)

## 2022-11-09 ENCOUNTER — APPOINTMENT (RX ONLY)
Dept: URBAN - METROPOLITAN AREA CLINIC 28 | Facility: CLINIC | Age: 62
Setting detail: DERMATOLOGY
End: 2022-11-09

## 2022-11-09 DIAGNOSIS — L82.1 OTHER SEBORRHEIC KERATOSIS: ICD-10-CM

## 2022-11-09 DIAGNOSIS — D22 MELANOCYTIC NEVI: ICD-10-CM

## 2022-11-09 DIAGNOSIS — D18.0 HEMANGIOMA: ICD-10-CM

## 2022-11-09 DIAGNOSIS — L81.4 OTHER MELANIN HYPERPIGMENTATION: ICD-10-CM

## 2022-11-09 DIAGNOSIS — Z71.89 OTHER SPECIFIED COUNSELING: ICD-10-CM

## 2022-11-09 DIAGNOSIS — D36.1 BENIGN NEOPLASM OF PERIPHERAL NERVES AND AUTONOMIC NERVOUS SYSTEM: ICD-10-CM

## 2022-11-09 PROBLEM — D48.5 NEOPLASM OF UNCERTAIN BEHAVIOR OF SKIN: Status: ACTIVE | Noted: 2022-11-09

## 2022-11-09 PROBLEM — D18.01 HEMANGIOMA OF SKIN AND SUBCUTANEOUS TISSUE: Status: ACTIVE | Noted: 2022-11-09

## 2022-11-09 PROBLEM — D36.13 BENIGN NEOPLASM OF PERIPHERAL NERVES AND AUTONOMIC NERVOUS SYSTEM OF LOWER LIMB, INCLUDING HIP: Status: ACTIVE | Noted: 2022-11-09

## 2022-11-09 PROBLEM — D22.5 MELANOCYTIC NEVI OF TRUNK: Status: ACTIVE | Noted: 2022-11-09

## 2022-11-09 PROCEDURE — ? PHOTO-DOCUMENTATION

## 2022-11-09 PROCEDURE — 99203 OFFICE O/P NEW LOW 30 MIN: CPT | Mod: 25

## 2022-11-09 PROCEDURE — ? BIOPSY BY SHAVE METHOD

## 2022-11-09 PROCEDURE — ? SUNSCREEN RECOMMENDATIONS

## 2022-11-09 PROCEDURE — ? SHAVE REMOVAL

## 2022-11-09 PROCEDURE — 11301 SHAVE SKIN LESION 0.6-1.0 CM: CPT

## 2022-11-09 PROCEDURE — ? COUNSELING

## 2022-11-09 PROCEDURE — 11102 TANGNTL BX SKIN SINGLE LES: CPT | Mod: 59

## 2022-11-09 PROCEDURE — ? TREATMENT REGIMEN

## 2022-11-09 ASSESSMENT — LOCATION DETAILED DESCRIPTION DERM
LOCATION DETAILED: LEFT SUPERIOR MEDIAL LOWER BACK
LOCATION DETAILED: SUPERIOR THORACIC SPINE
LOCATION DETAILED: LEFT PROXIMAL POSTERIOR THIGH
LOCATION DETAILED: PERIUMBILICAL SKIN
LOCATION DETAILED: SUPERIOR LUMBAR SPINE
LOCATION DETAILED: LEFT MID-UPPER BACK
LOCATION DETAILED: RIGHT MEDIAL UPPER BACK

## 2022-11-09 ASSESSMENT — LOCATION ZONE DERM
LOCATION ZONE: LEG
LOCATION ZONE: TRUNK

## 2022-11-09 ASSESSMENT — LOCATION SIMPLE DESCRIPTION DERM
LOCATION SIMPLE: ABDOMEN
LOCATION SIMPLE: UPPER BACK
LOCATION SIMPLE: LOWER BACK
LOCATION SIMPLE: LEFT UPPER BACK
LOCATION SIMPLE: LEFT LOWER BACK
LOCATION SIMPLE: RIGHT UPPER BACK
LOCATION SIMPLE: LEFT POSTERIOR THIGH

## 2022-11-09 NOTE — PROCEDURE: SUNSCREEN RECOMMENDATIONS
General Sunscreen Counseling: - The nature of sun-induced photo-aging and skin cancers is discussed.  Sun avoidance, protective clothing, and the use of 30-SPF sunscreens are advised. Observe closely for skin damage/changes, and call if such occur.\\n- Advised to use daily sunscreen SPF 30 with UVB and UVA protection daily.  Apply at least 10 minutes prior to going outside, and reapply every 2 hours outside.  Recommended sunblocks with zinc oxide or titanium dioxide (though these tend not to rub in as well).
Products Recommended: According to Consumer Reports:\\n\\nThree sunscreens were given the Consumer Reports \"Best Buy\" rating:\\n\\nUp & Up Sport SPF 30 \\nNo-Ad with Aloe and Vitamin E SPF 45 \\nEquate Baby SPF 50\\nSix others were recommended:\\nBanana Boat Sport Performance SPF 30 \\nCoppertone Sport Ultra Sweatproof SPF 30 \\nCVS Fast Cover Sport SPF 30 \\nWalgreens Sport SPF 50 \\nOcean Potion Kids Instant Dry Mist SPF 50 \\nBanana Boat Sport Performance 
Detail Level: Detailed

## 2022-11-09 NOTE — PROCEDURE: BIOPSY BY SHAVE METHOD

## 2022-11-09 NOTE — PROCEDURE: SHAVE REMOVAL
Medical Necessity Information: It is in your best interest to select a reason for this procedure from the list below. All of these items fulfill various CMS LCD requirements except the new and changing color options.
Medical Necessity Clause: This procedure was medically necessary because the lesion that was treated was:
Lab: 6
Lab Facility: 0
Detail Level: Detailed
Was A Bandage Applied: Yes
Size Of Lesion In Cm (Required): 0.6
Biopsy Method: Dermablade
Anesthesia Type: 1% lidocaine with epinephrine
Anesthesia Volume In Cc: 0.3
Hemostasis: Electrocautery
Wound Care: Petrolatum
Render Path Notes In Note?: No
Consent was obtained from the patient. The risks and benefits to therapy were discussed in detail. Specifically, the risks of infection, scarring, bleeding, prolonged wound healing, incomplete removal, allergy to anesthesia, nerve injury and recurrence were addressed. Prior to the procedure, the treatment site was clearly identified and confirmed by the patient. All components of Universal Protocol/PAUSE Rule completed.
Post-Care Instructions: I reviewed with the patient in detail post-care instructions. Patient is to keep the biopsy site dry overnight, and then apply bacitracin twice daily until healed. Patient may apply hydrogen peroxide soaks to remove any crusting.
Notification Instructions: Patient will be notified of pathology results. However, patient instructed to call the office if not contacted within 2 weeks.
Billing Type: Third-Party Bill

## 2023-01-22 DIAGNOSIS — M81.0 OSTEOPOROSIS WITHOUT CURRENT PATHOLOGICAL FRACTURE, UNSPECIFIED OSTEOPOROSIS TYPE: Primary | ICD-10-CM

## 2023-01-25 RX ORDER — ALENDRONATE SODIUM 70 MG/1
TABLET ORAL
Qty: 12 TABLET | Refills: 1 | Status: SHIPPED | OUTPATIENT
Start: 2023-01-25 | End: 2023-07-10

## 2023-01-25 RX ORDER — ALENDRONATE SODIUM 70 MG/1
70 TABLET ORAL
Qty: 12 TABLET | Refills: 3 | OUTPATIENT
Start: 2023-01-25 | End: 2024-01-25

## 2023-06-23 ENCOUNTER — OFFICE VISIT (OUTPATIENT)
Dept: ENDOCRINOLOGY | Facility: CLINIC | Age: 63
End: 2023-06-23
Payer: COMMERCIAL

## 2023-06-23 VITALS
WEIGHT: 127 LBS | SYSTOLIC BLOOD PRESSURE: 120 MMHG | DIASTOLIC BLOOD PRESSURE: 70 MMHG | HEIGHT: 67 IN | HEART RATE: 65 BPM | BODY MASS INDEX: 19.93 KG/M2

## 2023-06-23 DIAGNOSIS — M81.0 OSTEOPOROSIS WITHOUT CURRENT PATHOLOGICAL FRACTURE, UNSPECIFIED OSTEOPOROSIS TYPE: Primary | ICD-10-CM

## 2023-06-23 PROCEDURE — 3008F BODY MASS INDEX DOCD: CPT | Performed by: INTERNAL MEDICINE

## 2023-06-23 PROCEDURE — 99214 OFFICE O/P EST MOD 30 MIN: CPT | Performed by: INTERNAL MEDICINE

## 2023-06-23 NOTE — LETTER
June 23, 2023     Monserrat Gonsalez MD  [address] [facility]  4th Floor  [facility] [place] [address]    Patient: Giovanna Noble  YOB: 1960  Date of Visit: 6/23/2023      Dear Dr. Gonsalez:    Thank you for referring Giovanna Noble to me for evaluation. Below are my notes for this consultation.    If you have questions, please do not hesitate to call me. I look forward to following your patient along with you.         Sincerely,        Jacque Pryor MD        CC: No Recipients    Jacque Pryor MD  6/23/2023 11:48 AM  Signed           Endocrinology Report     Giovanna Noble is a 62 y.o. female who presents for follow up of     1 osteoporosis.   - Patient had a fall from standing position in 2018.  Had R hip and wrist frx. S/p ORIF.    - DXA 2018, 2021 lowest T score  > -2.5  - risk factors: old age, post menopause female, history of low vit D (corrected), relatively low BMI.   - no secondary causes identified  - treatment history:    Calcium supplement: none   Vitamin D supplement: took vit D 50,000 units for 3 months before Sept.     Last visit: 12/2021  Intervals:   - no medical changes.   - taking calcium and vi D   - exercising with yoga, lifting weights, eating balanced meal.    - we tried apply for Evenity, Tymlos. Both were denied by insurance. taking Fosamax 2/2022. Tolerating well    Medical History:  Past Medical History:   Diagnosis Date   • Abnormal Pap smear of cervix    • Acne    • Arthritis    • Breast cancer screening 07/05/2018    birads 1   • Closed fracture of neck of right femur (CMS/HCC) 5/5/2018    Added automatically from request for surgery [id number]   • Closed fracture of right wrist 5/6/2018   • Depression      a year ago   • History of varicella    • Keratoconus, stable     Carlos Weeks   • Osteopenia 07/2018   • Pap smear for cervical cancer screening 06/25/2018    abnormal pap, hpv abnormal   • Papanicolaou smear of anus with atypical squamous cells of undetermined significance (ASC-US) 9/10/2018     Added automatically from request for surgery [id number]   • Rosacea        Surgical History:   Past Surgical History:   Procedure Laterality Date   •  SECTION     • DILATION AND CURETTAGE, DIAGNOSTIC / THERAPEUTIC     • FRACTURE SURGERY      right hip   • FRACTURE SURGERY      right wrist   • HIP SURGERY  2018   • HYSTERECTOMY      ovaries remain       Family History:  Family History   Problem Relation Age of Onset   • Hypertension Biological Mother    • Glaucoma Biological Mother    • Dementia Biological Mother    • Stroke Biological Father    • Multiple sclerosis Biological Father    • Hypertension Biological Father    • Heart attack Biological Father         mid-50's   • Thyroid cancer Biological Sister    • Alcohol abuse Biological Son    • No Known Problems Biological Brother    • Breast cancer Neg Hx    • Colon cancer Neg Hx        Social history:  Social History     Tobacco Use   • Smoking status: Never   • Smokeless tobacco: Never   Vaping Use   • Vaping Use: Never used   Substance Use Topics   • Alcohol use: Yes     Comment: on occasion   • Drug use: No       Medication(active prior to today):  Current Outpatient Medications   Medication Sig Dispense Refill   • alendronate (FOSAMAX) 70 mg tablet TAKE 1 TABLET BY MOUTH EVERY 7 DAYS ON AN EMPTY STOMACH. REMAIN UPRIGHT FOR AT LEAST 30 MINUTES. 12 tablet 1   • calcium carbonate-vitamin D3 (Os-Joel 500 + D3) 500 mg(1,250mg) -200 unit per tablet Take 1 tablet by mouth 2 (two) times a day with meals. 180 tablet 3   • Lactobac no.41/Bifidobact no.7 (PROBIOTIC-10 ORAL) Take by mouth daily.       No current facility-administered medications for this visit.       Allergies:  Patient has no known allergies.    ROS:  All other systems reviewed and negative except as noted in HPI    Labs:      Latest Reference Range & Units 22 07:19   Sodium 134 - 144 mmol/L 143   Potassium 3.5 - 5.2 mmol/L 4.3   Chloride 96 - 106 mmol/L 104   CO2 20 - 29 mmol/L 25   BUN  8 - 27 mg/dL 15   Creatinine 0.57 - 1.00 mg/dL 0.79   Glucose 65 - 99 mg/dL 87   Calcium 8.7 - 10.3 mg/dL 9.6   AST (SGOT) 0 - 40 IU/L 23   ALT (SGPT) 0 - 32 IU/L 13   Alkaline Phosphatase 44 - 121 IU/L 56   Total Protein 6.0 - 8.5 g/dL 6.9   Albumin 3.8 - 4.9 g/dL 4.7   Bilirubin, Total 0.0 - 1.2 mg/dL 0.6   eGFR >59 mL/min/1.73 86   Bun/Creatinine Ratio 12 - 28  19   Globulin 1.5 - 4.5 g/dL 2.2   Cholesterol 100 - 199 mg/dL 203 (H)   Triglycerides 0 - 149 mg/dL 57   HDL >39 mg/dL 68   LDL Calculated 0 - 99 mg/dL 125 (H)   Hemoglobin A1C 4.8 - 5.6 % 5.5   Vit D, 25-Hydroxy 30.0 - 100.0 ng/mL 32.9   ALBUMIN/GLOBULIN RATIO 1.2 - 2.2  2.1   WBC 3.4 - 10.8 x10E3/uL 5.8   RBC 3.77 - 5.28 x10E6/uL 4.49   Hemoglobin 11.1 - 15.9 g/dL 13.8   Hematocrit 34.0 - 46.6 % 41.3   MCV 79 - 97 fL 92   MCH 26.6 - 33.0 pg 30.7   MCHC 31.5 - 35.7 g/dL 33.4   RDW 11.7 - 15.4 % 12.6   Platelets 150 - 450 x10E3/uL 213     Images:   7/2018    The DEXA was performed on [employer] equipment.     AP lumbar spine T-score:      L1-L4       -0.3  Corresponding bone mineral density:       1.152 g/cm2.     AP left hip lowest T-score:    Femoral neck      -1.8  Corresponding bone mineral density:       0.792 g/cm2.       7/2021  The DEXA was performed on [employer] ADVANCE equipment.     AP lumbar spine T-score       L1-L4                         -0.7  Corresponding bone mineral density:       1.104 g/cm2.  There has been a 4.2% worsening since the previous study.     AP left hip lowest T-score:    Femoral neck      -1.9  Corresponding bone mineral density:       0.768 g/cm2.  There has been no significant change of the total hip since the previous study.     AP right hip was not performed secondary to a history of surgery.     ASSESSMENT:/Plan:    1, Osteoporosis  - evidenced by fragility frx in 2018  - DXEA 2021 showing mild worsening of BMD    - risk factors: old age, post menopause female, history of low vit D (corrected),  relatively low BMI.   - no secondary causes identified.     -  continue calcium and vit D supplement  - fall precaution  - encourage to continue weight bearing exercises.   - cont Fosamax   - lab now.    - return in 1 yr with DXA    RTC in 1 yr  Orders Placed This Encounter   Procedures   • DEXA BONE DENSITY     Standing Status:   Future     Standing Expiration Date:   6/23/2024     Order Specific Question:   Does the patient take a Calcium supplement?     Answer:   Yes     Order Specific Question:   Release to patient     Answer:   Immediate   • Vitamin D 25 hydroxy     Order Specific Question:   Release to patient     Answer:   Immediate   • Basic metabolic panel     Order Specific Question:   Release to patient     Answer:   Immediate   • TSH w reflex FT4     Standing Status:   Future     Number of Occurrences:   1     Standing Expiration Date:   6/23/2024     Order Specific Question:   Release to patient     Answer:   Immediate           Thank you very much for allowing me participating in the patient's care. Please feel free to contact me if any questions.     Electronically signed by Jacque Pryor MD

## 2023-06-23 NOTE — PATIENT INSTRUCTIONS
Keep calcium and vit D  Keep exercise and balanced diet  Get lab test done now  Return in 1 yr with DXA.

## 2023-06-23 NOTE — PROGRESS NOTES
Endocrinology Report     Giovanna Noble is a 62 y.o. female who presents for follow up of     1 osteoporosis.   - Patient had a fall from standing position in 2018.  Had R hip and wrist frx. S/p ORIF.    - DXA 2021 lowest T score  > -2.5  - risk factors: old age, post menopause female, history of low vit D (corrected), relatively low BMI.   - no secondary causes identified  - treatment history:    Calcium supplement: none   Vitamin D supplement: took vit D 50,000 units for 3 months before Sept.     Last visit: 2021  Intervals:   - no medical changes.   - taking calcium and vi D   - exercising with yoga, lifting weights, eating balanced meal.    - we tried apply for Evenity, Tymlos. Both were denied by insurance. taking Fosamax 2022. Tolerating well    Medical History:  Past Medical History:   Diagnosis Date   • Abnormal Pap smear of cervix    • Acne    • Arthritis    • Breast cancer screening 2018    birads 1   • Closed fracture of neck of right femur (CMS/HCC) 2018    Added automatically from request for surgery [id number]   • Closed fracture of right wrist 2018   • Depression      a year ago   • History of varicella    • Keratoconus, stable     Carlos Weeks   • Osteopenia 2018   • Pap smear for cervical cancer screening 2018    abnormal pap, hpv abnormal   • Papanicolaou smear of anus with atypical squamous cells of undetermined significance (ASC-US) 9/10/2018    Added automatically from request for surgery [id number]   • Rosacea        Surgical History:   Past Surgical History:   Procedure Laterality Date   •  SECTION     • DILATION AND CURETTAGE, DIAGNOSTIC / THERAPEUTIC     • FRACTURE SURGERY      right hip   • FRACTURE SURGERY      right wrist   • HIP SURGERY  2018   • HYSTERECTOMY      ovaries remain       Family History:  Family History   Problem Relation Age of Onset   • Hypertension Biological Mother    • Glaucoma Biological Mother    • Dementia  Biological Mother    • Stroke Biological Father    • Multiple sclerosis Biological Father    • Hypertension Biological Father    • Heart attack Biological Father         mid-50's   • Thyroid cancer Biological Sister    • Alcohol abuse Biological Son    • No Known Problems Biological Brother    • Breast cancer Neg Hx    • Colon cancer Neg Hx        Social history:  Social History     Tobacco Use   • Smoking status: Never   • Smokeless tobacco: Never   Vaping Use   • Vaping Use: Never used   Substance Use Topics   • Alcohol use: Yes     Comment: on occasion   • Drug use: No       Medication(active prior to today):  Current Outpatient Medications   Medication Sig Dispense Refill   • alendronate (FOSAMAX) 70 mg tablet TAKE 1 TABLET BY MOUTH EVERY 7 DAYS ON AN EMPTY STOMACH. REMAIN UPRIGHT FOR AT LEAST 30 MINUTES. 12 tablet 1   • calcium carbonate-vitamin D3 (Os-Joel 500 + D3) 500 mg(1,250mg) -200 unit per tablet Take 1 tablet by mouth 2 (two) times a day with meals. 180 tablet 3   • Lactobac no.41/Bifidobact no.7 (PROBIOTIC-10 ORAL) Take by mouth daily.       No current facility-administered medications for this visit.       Allergies:  Patient has no known allergies.    ROS:  All other systems reviewed and negative except as noted in HPI    Labs:      Latest Reference Range & Units 09/14/22 07:19   Sodium 134 - 144 mmol/L 143   Potassium 3.5 - 5.2 mmol/L 4.3   Chloride 96 - 106 mmol/L 104   CO2 20 - 29 mmol/L 25   BUN 8 - 27 mg/dL 15   Creatinine 0.57 - 1.00 mg/dL 0.79   Glucose 65 - 99 mg/dL 87   Calcium 8.7 - 10.3 mg/dL 9.6   AST (SGOT) 0 - 40 IU/L 23   ALT (SGPT) 0 - 32 IU/L 13   Alkaline Phosphatase 44 - 121 IU/L 56   Total Protein 6.0 - 8.5 g/dL 6.9   Albumin 3.8 - 4.9 g/dL 4.7   Bilirubin, Total 0.0 - 1.2 mg/dL 0.6   eGFR >59 mL/min/1.73 86   Bun/Creatinine Ratio 12 - 28  19   Globulin 1.5 - 4.5 g/dL 2.2   Cholesterol 100 - 199 mg/dL 203 (H)   Triglycerides 0 - 149 mg/dL 57   HDL >39 mg/dL 68   LDL Calculated 0 -  99 mg/dL 125 (H)   Hemoglobin A1C 4.8 - 5.6 % 5.5   Vit D, 25-Hydroxy 30.0 - 100.0 ng/mL 32.9   ALBUMIN/GLOBULIN RATIO 1.2 - 2.2  2.1   WBC 3.4 - 10.8 x10E3/uL 5.8   RBC 3.77 - 5.28 x10E6/uL 4.49   Hemoglobin 11.1 - 15.9 g/dL 13.8   Hematocrit 34.0 - 46.6 % 41.3   MCV 79 - 97 fL 92   MCH 26.6 - 33.0 pg 30.7   MCHC 31.5 - 35.7 g/dL 33.4   RDW 11.7 - 15.4 % 12.6   Platelets 150 - 450 x10E3/uL 213     Images:   7/2018    The DEXA was performed on [employer] ADVANCE equipment.     AP lumbar spine T-score:      L1-L4       -0.3  Corresponding bone mineral density:       1.152 g/cm2.     AP left hip lowest T-score:    Femoral neck      -1.8  Corresponding bone mineral density:       0.792 g/cm2.       7/2021  The DEXA was performed on [employer] ADVANCE equipment.     AP lumbar spine T-score       L1-L4                         -0.7  Corresponding bone mineral density:       1.104 g/cm2.  There has been a 4.2% worsening since the previous study.     AP left hip lowest T-score:    Femoral neck      -1.9  Corresponding bone mineral density:       0.768 g/cm2.  There has been no significant change of the total hip since the previous study.     AP right hip was not performed secondary to a history of surgery.     ASSESSMENT:/Plan:    1, Osteoporosis  - evidenced by fragility frx in 2018  - DXEA 2021 showing mild worsening of BMD    - risk factors: old age, post menopause female, history of low vit D (corrected), relatively low BMI.   - no secondary causes identified.     -  continue calcium and vit D supplement  - fall precaution  - encourage to continue weight bearing exercises.   - cont Fosamax   - lab now.    - return in 1 yr with DXA    RTC in 1 yr  Orders Placed This Encounter   Procedures   • DEXA BONE DENSITY     Standing Status:   Future     Standing Expiration Date:   6/23/2024     Order Specific Question:   Does the patient take a Calcium supplement?     Answer:   Yes     Order Specific Question:    Release to patient     Answer:   Immediate   • Vitamin D 25 hydroxy     Order Specific Question:   Release to patient     Answer:   Immediate   • Basic metabolic panel     Order Specific Question:   Release to patient     Answer:   Immediate   • TSH w reflex FT4     Standing Status:   Future     Number of Occurrences:   1     Standing Expiration Date:   6/23/2024     Order Specific Question:   Release to patient     Answer:   Immediate           Thank you very much for allowing me participating in the patient's care. Please feel free to contact me if any questions.     Electronically signed by Jacque Pryor MD

## 2023-07-08 DIAGNOSIS — M81.0 OSTEOPOROSIS WITHOUT CURRENT PATHOLOGICAL FRACTURE, UNSPECIFIED OSTEOPOROSIS TYPE: ICD-10-CM

## 2023-07-10 RX ORDER — ALENDRONATE SODIUM 70 MG/1
TABLET ORAL
Qty: 12 TABLET | Refills: 1 | Status: SHIPPED | OUTPATIENT
Start: 2023-07-10 | End: 2023-12-27

## 2023-07-21 ENCOUNTER — HOSPITAL ENCOUNTER (OUTPATIENT)
Dept: RADIOLOGY | Age: 63
Discharge: HOME | End: 2023-07-21
Attending: INTERNAL MEDICINE
Payer: COMMERCIAL

## 2023-07-21 DIAGNOSIS — M81.0 OSTEOPOROSIS WITHOUT CURRENT PATHOLOGICAL FRACTURE, UNSPECIFIED OSTEOPOROSIS TYPE: ICD-10-CM

## 2023-07-21 PROCEDURE — 77080 DXA BONE DENSITY AXIAL: CPT

## 2023-07-31 ENCOUNTER — APPOINTMENT (RX ONLY)
Dept: URBAN - METROPOLITAN AREA CLINIC 28 | Facility: CLINIC | Age: 63
Setting detail: DERMATOLOGY
End: 2023-07-31

## 2023-07-31 DIAGNOSIS — L03.01 CELLULITIS OF FINGER: ICD-10-CM

## 2023-07-31 PROBLEM — L03.011 CELLULITIS OF RIGHT FINGER: Status: ACTIVE | Noted: 2023-07-31

## 2023-07-31 PROCEDURE — ? PRESCRIPTION MEDICATION MANAGEMENT

## 2023-07-31 PROCEDURE — 99213 OFFICE O/P EST LOW 20 MIN: CPT

## 2023-07-31 PROCEDURE — ? COUNSELING

## 2023-07-31 PROCEDURE — ? PRESCRIPTION

## 2023-07-31 RX ORDER — MUPIROCIN 20 MG/G
1 OINTMENT TOPICAL QD
Qty: 22 | Refills: 3 | Status: ERX | COMMUNITY
Start: 2023-07-31

## 2023-07-31 RX ORDER — DOXYCYCLINE 100 MG/1
1 TABLET, FILM COATED ORAL BID
Qty: 14 | Refills: 0 | Status: ERX | COMMUNITY
Start: 2023-07-31

## 2023-07-31 RX ORDER — TRIAMCINOLONE ACETONIDE 1 MG/G
1 CREAM TOPICAL BID
Qty: 30 | Refills: 1 | Status: ERX | COMMUNITY
Start: 2023-07-31

## 2023-07-31 RX ADMIN — TRIAMCINOLONE ACETONIDE 1: 1 CREAM TOPICAL at 00:00

## 2023-07-31 RX ADMIN — MUPIROCIN 1: 20 OINTMENT TOPICAL at 00:00

## 2023-07-31 RX ADMIN — DOXYCYCLINE 1: 100 TABLET, FILM COATED ORAL at 00:00

## 2023-07-31 ASSESSMENT — LOCATION DETAILED DESCRIPTION DERM: LOCATION DETAILED: RIGHT RING FINGERNAIL

## 2023-07-31 ASSESSMENT — LOCATION SIMPLE DESCRIPTION DERM: LOCATION SIMPLE: RIGHT RING FINGERNAIL

## 2023-07-31 ASSESSMENT — LOCATION ZONE DERM: LOCATION ZONE: FINGERNAIL

## 2023-07-31 NOTE — PROCEDURE: PRESCRIPTION MEDICATION MANAGEMENT
Render In Strict Bullet Format?: No
Initiate Treatment: doxycycline monohydrate 100 mg tablet: take one tab PO BID with food and water x1 week\\ntriamcinolone acetonide 0.1 % topical cream: Apply to finger BID x 2 weeks\\nmupirocin 2 % topical ointment: Apply to finger BID x 1-2 weeks
Detail Level: Simple

## 2023-08-14 ENCOUNTER — APPOINTMENT (RX ONLY)
Dept: URBAN - METROPOLITAN AREA CLINIC 28 | Facility: CLINIC | Age: 63
Setting detail: DERMATOLOGY
End: 2023-08-14

## 2023-08-14 DIAGNOSIS — L81.4 OTHER MELANIN HYPERPIGMENTATION: ICD-10-CM

## 2023-08-14 DIAGNOSIS — L03.01 CELLULITIS OF FINGER: ICD-10-CM

## 2023-08-14 PROBLEM — L03.011 CELLULITIS OF RIGHT FINGER: Status: ACTIVE | Noted: 2023-08-14

## 2023-08-14 PROCEDURE — ? PRESCRIPTION MEDICATION MANAGEMENT

## 2023-08-14 PROCEDURE — ? PHOTO-DOCUMENTATION

## 2023-08-14 PROCEDURE — ? PRESCRIPTION

## 2023-08-14 PROCEDURE — 99213 OFFICE O/P EST LOW 20 MIN: CPT

## 2023-08-14 PROCEDURE — ? TOPICAL RETINOID COUNSELING

## 2023-08-14 PROCEDURE — ? COUNSELING

## 2023-08-14 RX ORDER — TRETIONIN 0.5 MG/G
1 CREAM TOPICAL QHS
Qty: 45 | Refills: 2 | Status: ERX | COMMUNITY
Start: 2023-08-14

## 2023-08-14 RX ADMIN — TRETIONIN 1: 0.5 CREAM TOPICAL at 00:00

## 2023-08-14 ASSESSMENT — LOCATION SIMPLE DESCRIPTION DERM
LOCATION SIMPLE: RIGHT CHEEK
LOCATION SIMPLE: RIGHT HAND
LOCATION SIMPLE: RIGHT RING FINGERNAIL
LOCATION SIMPLE: LEFT HAND

## 2023-08-14 ASSESSMENT — LOCATION DETAILED DESCRIPTION DERM
LOCATION DETAILED: RIGHT RADIAL DORSAL HAND
LOCATION DETAILED: RIGHT INFERIOR CENTRAL MALAR CHEEK
LOCATION DETAILED: RIGHT RING FINGERNAIL
LOCATION DETAILED: LEFT ULNAR DORSAL HAND

## 2023-08-14 ASSESSMENT — LOCATION ZONE DERM
LOCATION ZONE: HAND
LOCATION ZONE: FINGERNAIL
LOCATION ZONE: FACE

## 2023-08-14 NOTE — PROCEDURE: PRESCRIPTION MEDICATION MANAGEMENT
Render In Strict Bullet Format?: No
Discontinue Regimen: doxycycline monohydrate 100 mg tablet: take one tab PO BID with food and water x1 week\\ntriamcinolone acetonide 0.1 % topical cream: Apply to finger BID x 2 weeks\\nmupirocin 2 % topical ointment: Apply to finger BID x 1-2 weeks
Detail Level: Zone
Initiate Treatment: tretinoin 0.05% topical cream: Apply a pea size amount to the face and hands QHS
Detail Level: Detailed

## 2023-08-18 ENCOUNTER — TELEMEDICINE (OUTPATIENT)
Dept: INTERNAL MEDICINE | Facility: CLINIC | Age: 63
End: 2023-08-18
Payer: COMMERCIAL

## 2023-08-18 DIAGNOSIS — R68.83 CHILLS: ICD-10-CM

## 2023-08-18 DIAGNOSIS — M79.10 MYALGIA: Primary | ICD-10-CM

## 2023-08-18 PROCEDURE — 99213 OFFICE O/P EST LOW 20 MIN: CPT | Mod: 95 | Performed by: INTERNAL MEDICINE

## 2023-08-18 NOTE — PROGRESS NOTES
Verification of Patient Location:  The patient affirms they are currently located in the following state: [place]    Request for Consent:    Audio and Video Encounter   Jasmyn, my name is Monserrat LAWRENCE DO Karen.  Before we proceed, can you please verify your identification by telling me your full name and date of birth?  Can you tell me who is in the room with you?    You and I are about to have a telemedicine check-in or visit because you have requested it.  This is a live video-conference.  I am a real person, speaking to you in real time.  There is no one else with me on the video-conference. I am not recording this conversation and I am asking you not to record it.  This telemedicine visit will be billed to your health insurance or you, if you are self-insured.  You understand you will be responsible for any copayments or coinsurances that apply to your telemedicine visit.  Communication platform used for this encounter:  [employer] Video Visit (Epic Video Client)       Before starting our telemedicine visit, I am required to get your consent for this virtual check-in or visit by telemedicine. Do you consent?      Patient Response to Request for Consent:  Yes      Visit Documentation:  Subjective     Patient ID: Giovanna Noble is a 63 y.o. female.  1960      HPI   For the past week she has had the chills, she has been achey. She takes advil and then she feels better for 5 hours and then she feels poorly again. When she checks her temperature, it is normal. No cough, sore throat, rash, n/v/d. No known tick bite. Not outside more. No fatigue. Symptoms have not progressed or improved. No known covid contact. Hasn't tested for covid. Sleep and appetite are normal. ADLs are not affected. Aches all over. Mostly upper chest. Has a dull headache. The legs are not bothering her as much as the upper area. The aches are more in the chest than the arms. No weakness in the UE or LE.         The following have been  reviewed and updated as appropriate in this visit:   Tobacco  Allergies  Meds  Problems  Med Hx  Surg Hx  Fam Hx       Review of Systems  As noted in HPI and otherwise negative    Assessment/Plan   Diagnoses and all orders for this visit:    Myalgia (Primary), Chills  -Likely viral illness.  Would recommend to give another week to see if symptoms resolve on their own.  Would recommend to check COVID test at home.  If symptoms do not resolve with time, then would recommend blood work to evaluate for Lyme, CBC, ESR and CRP given the location of the achiness being in the upper chest wall mostly.  She will let me know if symptoms are persistent.      Time Spent:  I spent 7 minutes on this date of service performing the following activities: obtaining history, entering orders, documenting, preparing for visit, obtaining / reviewing records and providing counseling and education.

## 2023-08-25 ENCOUNTER — OFFICE VISIT (OUTPATIENT)
Dept: INTERNAL MEDICINE | Facility: CLINIC | Age: 63
End: 2023-08-25
Payer: COMMERCIAL

## 2023-08-25 VITALS
SYSTOLIC BLOOD PRESSURE: 88 MMHG | OXYGEN SATURATION: 97 % | WEIGHT: 128.38 LBS | BODY MASS INDEX: 20.15 KG/M2 | HEIGHT: 67 IN | TEMPERATURE: 97.2 F | HEART RATE: 72 BPM | DIASTOLIC BLOOD PRESSURE: 62 MMHG

## 2023-08-25 DIAGNOSIS — Z11.4 SCREENING FOR HIV (HUMAN IMMUNODEFICIENCY VIRUS): ICD-10-CM

## 2023-08-25 DIAGNOSIS — Z13.1 SCREENING FOR DIABETES MELLITUS: ICD-10-CM

## 2023-08-25 DIAGNOSIS — Z12.31 ENCOUNTER FOR SCREENING MAMMOGRAM FOR MALIGNANT NEOPLASM OF BREAST: ICD-10-CM

## 2023-08-25 DIAGNOSIS — Z00.00 ENCOUNTER FOR ANNUAL PHYSICAL EXAM: Primary | ICD-10-CM

## 2023-08-25 DIAGNOSIS — E78.5 HYPERLIPIDEMIA, UNSPECIFIED HYPERLIPIDEMIA TYPE: ICD-10-CM

## 2023-08-25 PROCEDURE — 3008F BODY MASS INDEX DOCD: CPT | Performed by: NURSE PRACTITIONER

## 2023-08-25 PROCEDURE — 99396 PREV VISIT EST AGE 40-64: CPT | Performed by: NURSE PRACTITIONER

## 2023-08-25 ASSESSMENT — ENCOUNTER SYMPTOMS
MUSCULOSKELETAL NEGATIVE: 1
RESPIRATORY NEGATIVE: 1
CONSTITUTIONAL NEGATIVE: 1
EYES NEGATIVE: 1
PSYCHIATRIC NEGATIVE: 1
ALLERGIC/IMMUNOLOGIC NEGATIVE: 1
NEUROLOGICAL NEGATIVE: 1
HEMATOLOGIC/LYMPHATIC NEGATIVE: 1
GASTROINTESTINAL NEGATIVE: 1
CARDIOVASCULAR NEGATIVE: 1
ENDOCRINE NEGATIVE: 1

## 2023-08-25 ASSESSMENT — PATIENT HEALTH QUESTIONNAIRE - PHQ9: SUM OF ALL RESPONSES TO PHQ9 QUESTIONS 1 & 2: 0

## 2023-08-25 NOTE — PROGRESS NOTES
[facility]  Medicine for Women        CHIEF COMPLAINT   Physical      HISTORY OF PRESENT ILLNESS      This is a 63 y.o. female who presents for a physical   Saw Dr. Jones last week for chills  telemed appt   Feeling a lot better now     GYN: s/p hysterectomy  Derm: annually-[facility]   Vision: annually  Dentist: every 6 months     Taking calcium vit   PAST MEDICAL AND SURGICAL HISTORY      PMHx:  Past Medical History:   Diagnosis Date   • Abnormal Pap smear of cervix    • Acne    • Arthritis    • Breast cancer screening 2018    birads 1   • Closed fracture of neck of right femur (CMS/HCC) 2018    Added automatically from request for surgery [id number]   • Closed fracture of right wrist 2018   • Depression      a year ago   • History of varicella    • Keratoconus, stable     Carlos Weeks   • Osteopenia 2018   • Pap smear for cervical cancer screening 2018    abnormal pap, hpv abnormal   • Papanicolaou smear of anus with atypical squamous cells of undetermined significance (ASC-US) 9/10/2018    Added automatically from request for surgery [id number]   • Rosacea      PSHx:  Past Surgical History:   Procedure Laterality Date   •  SECTION     • DILATION AND CURETTAGE, DIAGNOSTIC / THERAPEUTIC     • FRACTURE SURGERY      right hip   • FRACTURE SURGERY      right wrist   • HIP SURGERY  2018   • HYSTERECTOMY      ovaries remain       Social History     Tobacco Use   • Smoking status: Never   • Smokeless tobacco: Never   Vaping Use   • Vaping Use: Never used   Substance Use Topics   • Alcohol use: Yes     Comment: on occasion   • Drug use: No       MEDICATIONS      Current Outpatient Medications on File Prior to Visit   Medication Sig Dispense Refill   • alendronate (FOSAMAX) 70 mg tablet TAKE 1 TABLET BY MOUTH EVERY 7 DAYS ON AN EMPTY STOMACH. REMAIN UPRIGHT FOR AT LEAST 30 MINUTES. 12 tablet 1   • calcium carbonate-vitamin D3 (Os-Joel 500 + D3) 500 mg(1,250mg) -200 unit per  tablet Take 1 tablet by mouth 2 (two) times a day with meals. 180 tablet 3   • Lactobac no.41/Bifidobact no.7 (PROBIOTIC-10 ORAL) Take by mouth daily.       No current facility-administered medications on file prior to visit.       Home medications were personally reviewed.    ALLERGIES      Patient has no known allergies.    FAMILY HISTORY      Family History   Problem Relation Age of Onset   • Hypertension Biological Mother    • Glaucoma Biological Mother    • Dementia Biological Mother    • Stroke Biological Father    • Multiple sclerosis Biological Father    • Hypertension Biological Father    • Heart attack Biological Father         mid-50's   • Thyroid cancer Biological Sister    • Alcohol abuse Biological Son    • No Known Problems Biological Brother    • Breast cancer Neg Hx    • Colon cancer Neg Hx        REVIEW OF SYSTEMS      Review of Systems   Constitutional: Negative.    HENT: Negative.    Eyes: Negative.    Respiratory: Negative.    Cardiovascular: Negative.    Gastrointestinal: Negative.    Endocrine: Negative.    Genitourinary: Negative.    Musculoskeletal: Negative.    Skin: Negative.    Allergic/Immunologic: Negative.    Neurological: Negative.    Hematological: Negative.    Psychiatric/Behavioral: Negative.        PHYSICAL EXAMINATION      Vitals:    08/25/23 1338   BP: (!) 88/62   Pulse: 72   Temp: 36.2 °C (97.2 °F)   SpO2: 97%       Body mass index is 20.11 kg/m².    Physical Exam  Constitutional:       Appearance: She is well-developed.   HENT:      Head: Normocephalic and atraumatic.   Eyes:      Conjunctiva/sclera: Conjunctivae normal.      Pupils: Pupils are equal, round, and reactive to light.   Cardiovascular:      Rate and Rhythm: Normal rate and regular rhythm.   Pulmonary:      Effort: Pulmonary effort is normal.      Breath sounds: Normal breath sounds.   Abdominal:      General: Bowel sounds are normal.      Palpations: Abdomen is soft.   Musculoskeletal:         General: Normal  range of motion.      Cervical back: Normal range of motion and neck supple.   Skin:     General: Skin is warm and dry.      Capillary Refill: Capillary refill takes less than 2 seconds.   Neurological:      Mental Status: She is alert and oriented to person, place, and time.   Psychiatric:         Behavior: Behavior normal.         LABS / IMAGING / STUDIES        Labs  No new labs.    Imaging  Not applicable      ASSESSMENT AND PLAN           Problem List Items Addressed This Visit        Other    Hyperlipidemia    Relevant Orders    Lipid panel    Encounter for annual physical exam - Primary     Please exercise at least 45 minutes 4 days a week.  Walking briskly is a great source of exercise.  The goal is for 150 minutes of aerobic exercise each week and 20 minutes of light weights twice a week.  Please receive a flu shot annually, ideally in October or November. See the dentist twice a year for routine cleaning. See Dermatology for routine skin checks and protect your skin from the sun. Follow a low-cholesterol diet to lessen the risk of heart disease.   If necessary, supplement with vitamin D3 1000 international units 4-5 days a week especially in the winter months.  Limit alcohol beverages to 5-7 a week. Protect your sleep. Do not text and drive.    Flu:annually in the fall  Tdap:2017  COVID 19: booster this fall  GYN:s/p hysterectomy   Vision: recommend every 2 years  Dentist:recommend every 6 months  Derm: seeing annually  Screening for Breast Ca: ordered  Screening for Colon Ca: 2015, due for repeat 2025            Relevant Orders    CBC and differential    Comprehensive metabolic panel   Other Visit Diagnoses     Encounter for screening mammogram for malignant neoplasm of breast        Relevant Orders    BI SCREENING MAMMOGRAM BILATERAL(TOMOSYNTHESIS)    Screening for diabetes mellitus        Relevant Orders    Hemoglobin A1c    Screening for HIV (human immunodeficiency virus)        Relevant Orders    HIV  1,2 AB P24 AG

## 2023-08-29 NOTE — ASSESSMENT & PLAN NOTE
Please exercise at least 45 minutes 4 days a week.  Walking briskly is a great source of exercise.  The goal is for 150 minutes of aerobic exercise each week and 20 minutes of light weights twice a week.  Please receive a flu shot annually, ideally in October or November. See the dentist twice a year for routine cleaning. See Dermatology for routine skin checks and protect your skin from the sun. Follow a low-cholesterol diet to lessen the risk of heart disease.   If necessary, supplement with vitamin D3 1000 international units 4-5 days a week especially in the winter months.  Limit alcohol beverages to 5-7 a week. Protect your sleep. Do not text and drive.    Flu:annually in the fall  Tdap:2017  COVID 19: booster this fall  GYN:s/p hysterectomy   Vision: recommend every 2 years  Dentist:recommend every 6 months  Derm: seeing annually  Screening for Breast Ca: ordered  Screening for Colon Ca: 2015, due for repeat 2025

## 2023-09-06 LAB
ALBUMIN SERPL-MCNC: 4.7 G/DL (ref 3.9–4.9)
ALBUMIN/GLOB SERPL: 2.4 {RATIO} (ref 1.2–2.2)
ALP SERPL-CCNC: 87 IU/L (ref 44–121)
ALT SERPL-CCNC: 55 IU/L (ref 0–32)
AST SERPL-CCNC: 51 IU/L (ref 0–40)
BASOPHILS # BLD AUTO: 0 X10E3/UL (ref 0–0.2)
BASOPHILS NFR BLD AUTO: 1 %
BILIRUB SERPL-MCNC: 0.4 MG/DL (ref 0–1.2)
BUN SERPL-MCNC: 14 MG/DL (ref 8–27)
BUN/CREAT SERPL: 20 (ref 12–28)
CALCIUM SERPL-MCNC: 9.5 MG/DL (ref 8.7–10.3)
CHLORIDE SERPL-SCNC: 104 MMOL/L (ref 96–106)
CHOLEST SERPL-MCNC: 208 MG/DL (ref 100–199)
CO2 SERPL-SCNC: 24 MMOL/L (ref 20–29)
CREAT SERPL-MCNC: 0.7 MG/DL (ref 0.57–1)
EGFRCR SERPLBLD CKD-EPI 2021: 98 ML/MIN/1.73
EOSINOPHIL # BLD AUTO: 0.1 X10E3/UL (ref 0–0.4)
EOSINOPHIL NFR BLD AUTO: 1 %
ERYTHROCYTE [DISTWIDTH] IN BLOOD BY AUTOMATED COUNT: 13 % (ref 11.7–15.4)
GLOBULIN SER CALC-MCNC: 2 G/DL (ref 1.5–4.5)
GLUCOSE SERPL-MCNC: 85 MG/DL (ref 70–99)
HBA1C MFR BLD: 5.7 % (ref 4.8–5.6)
HCT VFR BLD AUTO: 39.7 % (ref 34–46.6)
HDLC SERPL-MCNC: 50 MG/DL
HGB BLD-MCNC: 13.2 G/DL (ref 11.1–15.9)
HIV 1+2 AB+HIV1 P24 AG SERPL QL IA: NON REACTIVE
IMM GRANULOCYTES # BLD AUTO: 0 X10E3/UL (ref 0–0.1)
IMM GRANULOCYTES NFR BLD AUTO: 0 %
LDLC SERPL CALC-MCNC: 140 MG/DL (ref 0–99)
LYMPHOCYTES # BLD AUTO: 2.6 X10E3/UL (ref 0.7–3.1)
LYMPHOCYTES NFR BLD AUTO: 36 %
MCH RBC QN AUTO: 30.3 PG (ref 26.6–33)
MCHC RBC AUTO-ENTMCNC: 33.2 G/DL (ref 31.5–35.7)
MCV RBC AUTO: 91 FL (ref 79–97)
MONOCYTES # BLD AUTO: 0.8 X10E3/UL (ref 0.1–0.9)
MONOCYTES NFR BLD AUTO: 10 %
NEUTROPHILS # BLD AUTO: 3.7 X10E3/UL (ref 1.4–7)
NEUTROPHILS NFR BLD AUTO: 52 %
PLATELET # BLD AUTO: 214 X10E3/UL (ref 150–450)
POTASSIUM SERPL-SCNC: 4.1 MMOL/L (ref 3.5–5.2)
PROT SERPL-MCNC: 6.7 G/DL (ref 6–8.5)
RBC # BLD AUTO: 4.36 X10E6/UL (ref 3.77–5.28)
SODIUM SERPL-SCNC: 141 MMOL/L (ref 134–144)
TRIGL SERPL-MCNC: 101 MG/DL (ref 0–149)
VLDLC SERPL CALC-MCNC: 18 MG/DL (ref 5–40)
WBC # BLD AUTO: 7.2 X10E3/UL (ref 3.4–10.8)

## 2023-09-07 ENCOUNTER — HOSPITAL ENCOUNTER (OUTPATIENT)
Dept: RADIOLOGY | Age: 63
Discharge: HOME | End: 2023-09-07
Attending: NURSE PRACTITIONER
Payer: COMMERCIAL

## 2023-09-07 DIAGNOSIS — Z12.31 ENCOUNTER FOR SCREENING MAMMOGRAM FOR MALIGNANT NEOPLASM OF BREAST: ICD-10-CM

## 2023-09-07 PROCEDURE — 77063 BREAST TOMOSYNTHESIS BI: CPT

## 2023-09-08 DIAGNOSIS — R74.8 ELEVATED LIVER ENZYMES: Primary | ICD-10-CM

## 2023-12-23 DIAGNOSIS — M81.0 OSTEOPOROSIS WITHOUT CURRENT PATHOLOGICAL FRACTURE, UNSPECIFIED OSTEOPOROSIS TYPE: ICD-10-CM

## 2023-12-27 RX ORDER — ALENDRONATE SODIUM 70 MG/1
TABLET ORAL
Qty: 12 TABLET | Refills: 1 | Status: SHIPPED | OUTPATIENT
Start: 2023-12-27 | End: 2024-06-11

## 2024-04-17 ENCOUNTER — OFFICE VISIT (OUTPATIENT)
Dept: INTERNAL MEDICINE | Facility: CLINIC | Age: 64
End: 2024-04-17
Payer: COMMERCIAL

## 2024-04-17 VITALS
SYSTOLIC BLOOD PRESSURE: 120 MMHG | HEIGHT: 67 IN | OXYGEN SATURATION: 98 % | BODY MASS INDEX: 19.78 KG/M2 | DIASTOLIC BLOOD PRESSURE: 78 MMHG | TEMPERATURE: 97.4 F | HEART RATE: 88 BPM | WEIGHT: 126 LBS

## 2024-04-17 DIAGNOSIS — R45.84 ANHEDONIA: ICD-10-CM

## 2024-04-17 DIAGNOSIS — R53.83 FATIGUE, UNSPECIFIED TYPE: Primary | ICD-10-CM

## 2024-04-17 DIAGNOSIS — L65.9 HAIR LOSS: ICD-10-CM

## 2024-04-17 PROCEDURE — 99214 OFFICE O/P EST MOD 30 MIN: CPT | Performed by: NURSE PRACTITIONER

## 2024-04-17 PROCEDURE — 3008F BODY MASS INDEX DOCD: CPT | Performed by: NURSE PRACTITIONER

## 2024-04-17 RX ORDER — SERTRALINE HYDROCHLORIDE 50 MG/1
TABLET, FILM COATED ORAL
Qty: 90 TABLET | Refills: 0 | Status: SHIPPED | OUTPATIENT
Start: 2024-04-17 | End: 2024-07-15

## 2024-04-17 ASSESSMENT — ENCOUNTER SYMPTOMS
RESPIRATORY NEGATIVE: 1
ENDOCRINE NEGATIVE: 1
CARDIOVASCULAR NEGATIVE: 1
HEMATOLOGIC/LYMPHATIC NEGATIVE: 1
PSYCHIATRIC NEGATIVE: 1
MUSCULOSKELETAL NEGATIVE: 1
NEUROLOGICAL NEGATIVE: 1
EYES NEGATIVE: 1
ALLERGIC/IMMUNOLOGIC NEGATIVE: 1

## 2024-04-17 ASSESSMENT — PATIENT HEALTH QUESTIONNAIRE - PHQ9
SUM OF ALL RESPONSES TO PHQ9 QUESTIONS 1 & 2: 3
SUM OF ALL RESPONSES TO PHQ QUESTIONS 1-9: 9

## 2024-04-17 NOTE — PROGRESS NOTES
[facility]        CHIEF COMPLAINT   Fatigue      HISTORY OF PRESENT ILLNESS      This is a 63 y.o. female who presents with fatigue. She has been under a lot of stress- arrested on federal charges and sentencing rescheduled 3 times. Mother recently passed. Feels no motivation like she can't get out of bed. Hair loss, constipation. Smaller friend Kashia due to covid. Stress from kids asking for money.    arrested by [employer] -sentencing rescheduled 3 times  PAST MEDICAL AND SURGICAL HISTORY      PMHx:  Past Medical History:   Diagnosis Date   • Abnormal Pap smear of cervix    • Acne    • Arthritis    • Breast cancer screening 2018    birads 1   • Closed fracture of neck of right femur (CMS/HCC) 2018    Added automatically from request for surgery [id number]   • Closed fracture of right wrist 2018   • Depression      a year ago   • History of varicella    • Keratoconus, stable     Carlos Weeks   • Osteopenia 2018   • Pap smear for cervical cancer screening 2018    abnormal pap, hpv abnormal   • Papanicolaou smear of anus with atypical squamous cells of undetermined significance (ASC-US) 9/10/2018    Added automatically from request for surgery [id number]   • Rosacea        PSHx:  Past Surgical History:   Procedure Laterality Date   •  SECTION     • DILATION AND CURETTAGE, DIAGNOSTIC / THERAPEUTIC     • FRACTURE SURGERY      right hip   • FRACTURE SURGERY      right wrist   • HIP SURGERY  2018   • HYSTERECTOMY      ovaries remain       Social History     Tobacco Use   • Smoking status: Never   • Smokeless tobacco: Never   Vaping Use   • Vaping Use: Never used   Substance Use Topics   • Alcohol use: Yes     Comment: on occasion   • Drug use: No       MEDICATIONS      Current Outpatient Medications on File Prior to Visit   Medication Sig Dispense Refill   • alendronate (FOSAMAX) 70 mg tablet TAKE 1 TABLET BY MOUTH EVERY 7 DAYS ON AN EMPTY  STOMACH. REMAIN UPRIGHT FOR AT LEAST 30 MINUTES. 12 tablet 1   • calcium carbonate-vitamin D3 (Os-Joel 500 + D3) 500 mg(1,250mg) -200 unit per tablet Take 1 tablet by mouth 2 (two) times a day with meals. 180 tablet 3   • Lactobac no.41/Bifidobact no.7 (PROBIOTIC-10 ORAL) Take by mouth daily.       No current facility-administered medications on file prior to visit.     Home medications were personally reviewed.    ALLERGIES      Patient has no known allergies.    FAMILY HISTORY      Family History   Problem Relation Age of Onset   • Hypertension Biological Mother    • Glaucoma Biological Mother    • Dementia Biological Mother    • Stroke Biological Father    • Multiple sclerosis Biological Father    • Hypertension Biological Father    • Heart attack Biological Father         mid-50's   • Thyroid cancer Biological Sister    • Alcohol abuse Biological Son    • No Known Problems Biological Brother    • Breast cancer Neg Hx    • Colon cancer Neg Hx        REVIEW OF SYSTEMS      Review of Systems   Constitutional: Positive for fatigue.   HENT: Negative.     Eyes: Negative.    Respiratory: Negative.     Cardiovascular: Negative.    Gastrointestinal: Positive for constipation.   Endocrine: Negative.    Genitourinary: Negative.    Musculoskeletal: Negative.    Skin: Negative.         Hair loss   Allergic/Immunologic: Negative.    Neurological: Negative.    Hematological: Negative.    Psychiatric/Behavioral: Negative.         PHYSICAL EXAMINATION      Vitals:    04/17/24 1027   BP: 120/78   Pulse: 88   Temp: 36.3 °C (97.4 °F)   SpO2: 98%     Body mass index is 19.73 kg/m².    Physical Exam  Constitutional:       Appearance: She is well-developed.   HENT:      Head: Normocephalic and atraumatic.   Eyes:      Conjunctiva/sclera: Conjunctivae normal.      Pupils: Pupils are equal, round, and reactive to light.   Cardiovascular:      Rate and Rhythm: Normal rate and regular rhythm.   Pulmonary:      Effort: Pulmonary effort is  normal.      Breath sounds: Normal breath sounds.   Abdominal:      General: Bowel sounds are normal.      Palpations: Abdomen is soft.   Musculoskeletal:         General: Normal range of motion.      Cervical back: Normal range of motion and neck supple.   Skin:     General: Skin is warm and dry.      Capillary Refill: Capillary refill takes less than 2 seconds.   Neurological:      Mental Status: She is alert and oriented to person, place, and time.   Psychiatric:         Behavior: Behavior normal.         LABS / IMAGING / STUDIES        Labs  Labs are pending.    Imaging  Not applicable      ASSESSMENT AND PLAN           Problem List Items Addressed This Visit        Mental Health    Anhedonia     --start sertraline-discussed dosage, indications, side effects  --med check 4 weeks  --non pharmacological stress reduction measures           Relevant Orders    CBC and differential    Comprehensive metabolic panel    TSH w reflex FT4    Vitamin D 25 hydroxy    Ferritin    Iron and TIBC    Folate    Vitamin B12       Other    Fatigue - Primary     --check labs          Relevant Orders    CBC and differential    Comprehensive metabolic panel    TSH w reflex FT4    Vitamin D 25 hydroxy    Ferritin    Iron and TIBC    Folate    Vitamin B12   Other Visit Diagnoses     Hair loss        Relevant Orders    CBC and differential    Comprehensive metabolic panel    TSH w reflex FT4    Vitamin D 25 hydroxy    Ferritin    Iron and TIBC    Folate    Vitamin B12

## 2024-04-17 NOTE — PATIENT INSTRUCTIONS
[facility] [phone]  [facility] [phone]  [facility] [phone]  [facility] [phone]  [facility] [phone]  [facility] [phone]

## 2024-04-29 PROBLEM — R53.83 FATIGUE: Status: ACTIVE | Noted: 2024-04-29

## 2024-04-29 PROBLEM — R45.84 ANHEDONIA: Status: ACTIVE | Noted: 2024-04-29

## 2024-04-29 ASSESSMENT — ENCOUNTER SYMPTOMS
FATIGUE: 1
ROS SKIN COMMENTS: HAIR LOSS
CONSTIPATION: 1

## 2024-04-29 NOTE — ASSESSMENT & PLAN NOTE
--start sertraline-discussed dosage, indications, side effects  --med check 4 weeks  --non pharmacological stress reduction measures

## 2024-04-30 LAB
25(OH)D3+25(OH)D2 SERPL-MCNC: 30.6 NG/ML (ref 30–100)
ALBUMIN SERPL-MCNC: 4.6 G/DL (ref 3.9–4.9)
ALBUMIN/GLOB SERPL: 2.2 {RATIO} (ref 1.2–2.2)
ALP SERPL-CCNC: 59 IU/L (ref 44–121)
ALT SERPL-CCNC: 18 IU/L (ref 0–32)
AST SERPL-CCNC: 25 IU/L (ref 0–40)
BASOPHILS # BLD AUTO: 0 X10E3/UL (ref 0–0.2)
BASOPHILS NFR BLD AUTO: 1 %
BILIRUB SERPL-MCNC: 0.3 MG/DL (ref 0–1.2)
BUN SERPL-MCNC: 14 MG/DL (ref 8–27)
BUN/CREAT SERPL: 21 (ref 12–28)
CALCIUM SERPL-MCNC: 9.7 MG/DL (ref 8.7–10.3)
CHLORIDE SERPL-SCNC: 105 MMOL/L (ref 96–106)
CO2 SERPL-SCNC: 25 MMOL/L (ref 20–29)
CREAT SERPL-MCNC: 0.67 MG/DL (ref 0.57–1)
EGFRCR SERPLBLD CKD-EPI 2021: 99 ML/MIN/1.73
EOSINOPHIL # BLD AUTO: 0.1 X10E3/UL (ref 0–0.4)
EOSINOPHIL NFR BLD AUTO: 1 %
ERYTHROCYTE [DISTWIDTH] IN BLOOD BY AUTOMATED COUNT: 12.7 % (ref 11.7–15.4)
FERRITIN SERPL-MCNC: 142 NG/ML (ref 15–150)
FOLATE SERPL-MCNC: 9.7 NG/ML
GLOBULIN SER CALC-MCNC: 2.1 G/DL (ref 1.5–4.5)
GLUCOSE SERPL-MCNC: 89 MG/DL (ref 70–99)
HCT VFR BLD AUTO: 40.6 % (ref 34–46.6)
HGB BLD-MCNC: 13.5 G/DL (ref 11.1–15.9)
IMM GRANULOCYTES # BLD AUTO: 0 X10E3/UL (ref 0–0.1)
IMM GRANULOCYTES NFR BLD AUTO: 0 %
IRON SATN MFR SERPL: 41 % (ref 15–55)
IRON SERPL-MCNC: 115 UG/DL (ref 27–139)
LYMPHOCYTES # BLD AUTO: 1.8 X10E3/UL (ref 0.7–3.1)
LYMPHOCYTES NFR BLD AUTO: 32 %
MCH RBC QN AUTO: 31 PG (ref 26.6–33)
MCHC RBC AUTO-ENTMCNC: 33.3 G/DL (ref 31.5–35.7)
MCV RBC AUTO: 93 FL (ref 79–97)
MONOCYTES # BLD AUTO: 0.5 X10E3/UL (ref 0.1–0.9)
MONOCYTES NFR BLD AUTO: 9 %
NEUTROPHILS # BLD AUTO: 3.2 X10E3/UL (ref 1.4–7)
NEUTROPHILS NFR BLD AUTO: 57 %
PLATELET # BLD AUTO: 215 X10E3/UL (ref 150–450)
POTASSIUM SERPL-SCNC: 4.3 MMOL/L (ref 3.5–5.2)
PROT SERPL-MCNC: 6.7 G/DL (ref 6–8.5)
RBC # BLD AUTO: 4.35 X10E6/UL (ref 3.77–5.28)
SODIUM SERPL-SCNC: 142 MMOL/L (ref 134–144)
T4 FREE SERPL-MCNC: 1.17 NG/DL (ref 0.82–1.77)
TIBC SERPL-MCNC: 278 UG/DL (ref 250–450)
TSH SERPL DL<=0.005 MIU/L-ACNC: 1.41 UIU/ML (ref 0.45–4.5)
UIBC SERPL-MCNC: 163 UG/DL (ref 118–369)
VIT B12 SERPL-MCNC: 277 PG/ML (ref 232–1245)
WBC # BLD AUTO: 5.6 X10E3/UL (ref 3.4–10.8)

## 2024-06-09 DIAGNOSIS — M81.0 OSTEOPOROSIS WITHOUT CURRENT PATHOLOGICAL FRACTURE, UNSPECIFIED OSTEOPOROSIS TYPE: ICD-10-CM

## 2024-06-11 RX ORDER — ALENDRONATE SODIUM 70 MG/1
TABLET ORAL
Qty: 12 TABLET | Refills: 0 | Status: SHIPPED | OUTPATIENT
Start: 2024-06-11 | End: 2024-06-28 | Stop reason: SDUPTHER

## 2024-06-17 ENCOUNTER — OFFICE VISIT (OUTPATIENT)
Dept: OBSTETRICS AND GYNECOLOGY | Facility: CLINIC | Age: 64
End: 2024-06-17
Payer: COMMERCIAL

## 2024-06-17 VITALS
BODY MASS INDEX: 19.71 KG/M2 | WEIGHT: 125.6 LBS | HEIGHT: 67 IN | SYSTOLIC BLOOD PRESSURE: 120 MMHG | DIASTOLIC BLOOD PRESSURE: 70 MMHG

## 2024-06-17 DIAGNOSIS — N89.8 VAGINAL DISCHARGE: Primary | ICD-10-CM

## 2024-06-17 DIAGNOSIS — N93.0 POSTCOITAL BLEEDING: ICD-10-CM

## 2024-06-17 PROCEDURE — 99202 OFFICE O/P NEW SF 15 MIN: CPT | Performed by: OBSTETRICS & GYNECOLOGY

## 2024-06-17 PROCEDURE — 3008F BODY MASS INDEX DOCD: CPT | Performed by: OBSTETRICS & GYNECOLOGY

## 2024-06-17 NOTE — PROGRESS NOTES
Patient ID: Giovanna Noble   : 1960 63 y.o.  MRN: [id number]   Visit Date: 2024    Subjective   Giovanna Noble is presenting today for Abnormal Uterine Bleeding (Vaginal bleeding after intercourse)      HPI  No LMP recorded (lmp unknown). Patient is postmenopausal.      Patient presets for new onset bleeding after sexual intercourse.     I did a TLH, BS, Cysto on her in 2019. This was for abnormal pap and inadequate LEEP. She had some prolonged post-op bleeding but when see for a final post-op visit. Cuff was well healed.     Today she reports that it started three months ago. It happened about once a week every time you had sex. She would notice some pink with wiping. Nothing on underwear. Sex was not painful when she noticed it. She does feel dry she does not use a lubricant. She finally called and made an appointment and then the bleeding stopped. She has had sex 4 times an nothing.    No unusual vaginal discharge. No itching burning, abnormal odor.     Past Medical History:  has a past medical history of Abnormal Pap smear of cervix, Acne, Arthritis, Breast cancer screening (2023), Closed fracture of neck of right femur (CMS/[facility]) (2018), Closed fracture of right wrist (2018), Depression, History of varicella, Keratoconus, stable, Osteopenia (2018), Pap smear for cervical cancer screening (2018), Papanicolaou smear of anus with atypical squamous cells of undetermined significance (ASC-US) (09/10/2018), and Rosacea.     Past Surgical History:  has a past surgical history that includes  section (1997); Hip surgery (2018); Dilation and curettage, diagnostic / therapeutic; Hysterectomy; Fracture surgery; and Fracture surgery.    Obstetric History:   OB History    Para Term  AB Living   2 2 2     2   SAB IAB Ectopic Multiple Live Births           2      # Outcome Date GA Lbr Emeterio/2nd Weight Sex Delivery Anes PTL Lv   2 Term 97 40w0d  4139  "g (9 lb 2 oz) M CS-Unspec   LILIAN   1 Term 12/18/93 40w0d  3232 g (7 lb 2 oz) M CS-Unspec   LILIAN       Family History: family history includes Alcohol abuse in her biological son; Dementia in her biological mother; Glaucoma in her biological mother; Heart attack in her biological father and biological mother; Hypertension in her biological father and biological mother; Multiple sclerosis in her biological father; No Known Problems in her biological brother; Stroke in her biological father; Thyroid cancer in her biological sister.    Medications:   Current Outpatient Medications:     alendronate (FOSAMAX) 70 mg tablet, TAKE 1 TABLET BY MOUTH EVERY 7 DAYS ON AN EMPTY STOMACH. REMAIN UPRIGHT FOR AT LEAST 30 MINUTES., Disp: 12 tablet, Rfl: 0    calcium carbonate-vitamin D3 (Os-Joel 500 + D3) 500 mg(1,250mg) -200 unit per tablet, Take 1 tablet by mouth 2 (two) times a day with meals., Disp: 180 tablet, Rfl: 3    Lactobac no.41/Bifidobact no.7 (PROBIOTIC-10 ORAL), Take by mouth daily., Disp: , Rfl:     sertraline (ZOLOFT) 50 mg tablet, Take 1/2 tab (25 mg) daily x 2 weeks then increase to 1 whole tab daily (50 mg), Disp: 90 tablet, Rfl: 0      Allergies: has No Known Allergies.       Vital Signs for this encounter: Visit Vitals  /70   Ht 1.702 m (5' 7\")   Wt 57 kg (125 lb 9.6 oz)   LMP  (LMP Unknown)   BMI 19.67 kg/m²       OBGyn Exam  General Appearance: Alert, cooperative, no acute distress  Head: Normocephalic, without obvious abnormality  Breast: Deferred  Abdomen: Soft, nontender, nondistended,no masses, no organomegaly  Pelvic exam: VULVA: normal appearing vulva with no masses, tenderness or lesions, VAGINA: normal appearing vagina with normal color and discharge, no lesions, CERVIX: surgically absent, UTERUS: surgically absent, vaginal cuff well healed, ADNEXA: no palpable  Extremities: no edema     exam conducted with chaperone present.       Impression/Plan:    63 y.o. is presenting today for Abnormal Uterine " Bleeding (Vaginal bleeding after intercourse)    Patient with bleeding after intercourse. She is s/p hysterectomy. Vaginal cuff normal. Likely trauma related to atrophy and dryness. Reviewed etiology of vaginal dryness and likelihood this is caused by reduced estrogen due to menopause. Options for treatment including vaginal lubricants, vaginal moisturizers, vaginal estrogen creams, and estring all discussed. Patient declines medical treatment at this time. Follow up:Will call office if she feels like there continues to be bleeding after lubricant use and we can consider estrogen therapy. Uberlube samples given today. Swab collected today to r/o infections.         Aimee Ballard MD

## 2024-06-18 LAB
A VAGINAE DNA VAG QL NAA+PROBE: NORMAL SCORE
BVAB2 DNA VAG QL NAA+PROBE: NORMAL SCORE
C ALBICANS DNA VAG QL NAA+PROBE: NEGATIVE
C GLABRATA DNA VAG QL NAA+PROBE: NEGATIVE
C KRUSEI DNA VAG QL NAA+PROBE: NEGATIVE
C LUSITANIAE DNA VAG QL NAA+PROBE: NEGATIVE
CANDIDA DNA VAG QL NAA+PROBE: NEGATIVE
MEGA1 DNA VAG QL NAA+PROBE: NORMAL SCORE

## 2024-06-28 ENCOUNTER — OFFICE VISIT (OUTPATIENT)
Dept: ENDOCRINOLOGY | Facility: CLINIC | Age: 64
End: 2024-06-28
Payer: COMMERCIAL

## 2024-06-28 VITALS
DIASTOLIC BLOOD PRESSURE: 72 MMHG | RESPIRATION RATE: 16 BRPM | SYSTOLIC BLOOD PRESSURE: 116 MMHG | OXYGEN SATURATION: 98 % | WEIGHT: 128.2 LBS | HEART RATE: 66 BPM | BODY MASS INDEX: 20.12 KG/M2 | HEIGHT: 67 IN

## 2024-06-28 DIAGNOSIS — M81.0 OSTEOPOROSIS WITHOUT CURRENT PATHOLOGICAL FRACTURE, UNSPECIFIED OSTEOPOROSIS TYPE: Primary | ICD-10-CM

## 2024-06-28 PROCEDURE — 99214 OFFICE O/P EST MOD 30 MIN: CPT | Performed by: INTERNAL MEDICINE

## 2024-06-28 PROCEDURE — 3008F BODY MASS INDEX DOCD: CPT | Performed by: INTERNAL MEDICINE

## 2024-06-28 RX ORDER — ACETAMINOPHEN 500 MG
2000 TABLET ORAL DAILY
Qty: 90 CAPSULE | Refills: 3 | Status: SHIPPED | OUTPATIENT
Start: 2024-06-28 | End: 2025-06-28

## 2024-06-28 RX ORDER — PSYLLIUM HUSK 0.4 G
1 CAPSULE ORAL DAILY
Qty: 90 TABLET | Refills: 3 | Status: SHIPPED | OUTPATIENT
Start: 2024-06-28 | End: 2025-06-28

## 2024-06-28 RX ORDER — ALENDRONATE SODIUM 70 MG/1
TABLET ORAL
Qty: 13 TABLET | Refills: 3 | Status: SHIPPED | OUTPATIENT
Start: 2024-06-28

## 2024-06-28 NOTE — PROGRESS NOTES
Endocrinology Report     Giovanna Noble is a 63 y.o. female who presents for follow up of     1 osteoporosis.   - Patient had a fall from standing position in 2018.  Had R hip and wrist frx. S/p ORIF.    - DXA 2021 lowest T score  > -2.5  - risk factors: old age, post menopause female, history of low vit D (corrected), relatively low BMI.   - no secondary causes identified  - treatment history:    Calcium supplement: none   Vitamin D supplement: took vit D 50,000 units for 3 months before Sept.     Last visit: 2023  Intervals:   - walking, weight lifting and yoga several times a week..   - on zoloft. Helping her depression.     - no medical changes.   - taking calcium and vi D     - exercising with yoga, lifting weights, eating balanced meal.    - Taking Fosamax 2022. Tolerating well    Medical History:  Past Medical History:   Diagnosis Date    Abnormal Pap smear of cervix     Acne     Arthritis     Breast cancer screening 2023    BI-RADS CATEGORY 1 - NEGATIVE  (NOR NC D)   HETEROGENEOUS    Closed fracture of neck of right femur (CMS/HCC) 2018    Added automatically from request for surgery [id number]    Closed fracture of right wrist 2018    Depression      a year ago    History of varicella     Keratoconus, stable     Carlos Weeks    Osteopenia 2018    Pap smear for cervical cancer screening 2018    abnormal pap, hpv abnormal    Papanicolaou smear of anus with atypical squamous cells of undetermined significance (ASC-US) 09/10/2018    Added automatically from request for surgery [id number]    Rosacea        Surgical History:   Past Surgical History:   Procedure Laterality Date     SECTION  1997    DILATION AND CURETTAGE, DIAGNOSTIC / THERAPEUTIC      FRACTURE SURGERY      right hip    FRACTURE SURGERY      right wrist    HIP SURGERY  2018    HYSTERECTOMY      ovaries remain       Family History:  Family History   Problem Relation Age of Onset     "Stroke Biological Father     Multiple sclerosis Biological Father     Hypertension Biological Father     Heart attack Biological Father         mid-50's    Heart attack Biological Mother     Hypertension Biological Mother     Glaucoma Biological Mother     Dementia Biological Mother     No Known Problems Biological Brother     Thyroid cancer Biological Sister     Alcohol abuse Biological Son     Breast cancer Neg Hx     Colon cancer Neg Hx        Social history:  Social History     Tobacco Use    Smoking status: Never    Smokeless tobacco: Never   Vaping Use    Vaping Use: Never used   Substance Use Topics    Alcohol use: Yes     Comment: on occasion    Drug use: Never       Medication(active prior to today):  Current Outpatient Medications   Medication Sig Dispense Refill    alendronate (FOSAMAX) 70 mg tablet Take on an empty stomach and do not lie down for the next 30 min. One pill weekly 13 tablet 3    calcium-vitamin D3 (Os-Joel 500 + D3) 500 mg-5 mcg (200 unit) per tablet Take 1 tablet by mouth daily. 90 tablet 3    cholecalciferol, vitamin D3, 50 mcg (2,000 unit) capsule Take 1 capsule (2,000 Units total) by mouth daily. 90 capsule 3    Lactobac no.41/Bifidobact no.7 (PROBIOTIC-10 ORAL) Take by mouth daily.      sertraline (ZOLOFT) 50 mg tablet Take 1/2 tab (25 mg) daily x 2 weeks then increase to 1 whole tab daily (50 mg) 90 tablet 0     No current facility-administered medications for this visit.       Allergies:  Patient has no known allergies.    ROS:  All other systems reviewed and negative except as noted in HPI    Visit Vitals  /72 (BP Location: Left upper arm, Patient Position: Sitting)   Pulse 66   Resp 16   Ht 1.702 m (5' 7\")   Wt 58.2 kg (128 lb 3.2 oz)   LMP  (LMP Unknown)   SpO2 98%   BMI 20.08 kg/m²       Labs:      Latest Reference Range & Units 09/14/22 07:19   Sodium 134 - 144 mmol/L 143   Potassium 3.5 - 5.2 mmol/L 4.3   Chloride 96 - 106 mmol/L 104   CO2 20 - 29 mmol/L 25   BUN 8 - 27 " mg/dL 15   Creatinine 0.57 - 1.00 mg/dL 0.79   Glucose 65 - 99 mg/dL 87   Calcium 8.7 - 10.3 mg/dL 9.6   AST (SGOT) 0 - 40 IU/L 23   ALT (SGPT) 0 - 32 IU/L 13   Alkaline Phosphatase 44 - 121 IU/L 56   Total Protein 6.0 - 8.5 g/dL 6.9   Albumin 3.8 - 4.9 g/dL 4.7   Bilirubin, Total 0.0 - 1.2 mg/dL 0.6   eGFR >59 mL/min/1.73 86   Bun/Creatinine Ratio 12 - 28  19   Globulin 1.5 - 4.5 g/dL 2.2   Cholesterol 100 - 199 mg/dL 203 (H)   Triglycerides 0 - 149 mg/dL 57   HDL >39 mg/dL 68   LDL Calculated 0 - 99 mg/dL 125 (H)   Hemoglobin A1C 4.8 - 5.6 % 5.5   Vit D, 25-Hydroxy 30.0 - 100.0 ng/mL 32.9   ALBUMIN/GLOBULIN RATIO 1.2 - 2.2  2.1   WBC 3.4 - 10.8 x10E3/uL 5.8   RBC 3.77 - 5.28 x10E6/uL 4.49   Hemoglobin 11.1 - 15.9 g/dL 13.8   Hematocrit 34.0 - 46.6 % 41.3   MCV 79 - 97 fL 92   MCH 26.6 - 33.0 pg 30.7   MCHC 31.5 - 35.7 g/dL 33.4   RDW 11.7 - 15.4 % 12.6   Platelets 150 - 450 x10E3/uL 213     Images:   7/2018    The DEXA was performed on [employer] equipment.     AP lumbar spine T-score:      L1-L4       -0.3  Corresponding bone mineral density:       1.152 g/cm2.     AP left hip lowest T-score:    Femoral neck      -1.8  Corresponding bone mineral density:       0.792 g/cm2.       7/2021  The DEXA was performed on [employer] equipment.     AP lumbar spine T-score       L1-L4                         -0.7  Corresponding bone mineral density:       1.104 g/cm2.  There has been a 4.2% worsening since the previous study.     AP left hip lowest T-score:    Femoral neck      -1.9  Corresponding bone mineral density:       0.768 g/cm2.  There has been no significant change of the total hip since the previous study.     AP right hip was not performed secondary to a history of surgery.     7/2023  The DEXA was performed on [employer] equipment.     AP lumbar spine T-score       L1-L4                         0.0  Corresponding bone mineral density:       1.196 g/cm2.  There has been  an 8.3% improvement since the previous study.  This value may be falsely elevated secondary to degenerative changes.     AP left hip lowest T-score:    Femoral neck      -1.7  Corresponding bone mineral density:       0.795 g/cm2.  There has been no significant change of the total hip since the previous study.     AP right hip was not performed secondary to history of surgery.    ASSESSMENT:/Plan:    1, Osteoporosis  - evidenced by fragility frx in 2018  - DXEA 2021 showing mild worsening of BMD    - risk factors: old age, post menopause female, history of low vit D (corrected), relatively low BMI.   - no secondary causes identified.     -  continue calcium and vit D supplement  - fall precaution  - encourage to continue weight bearing exercises.   - cont Fosamax   - DXA showed improvement  - return in 1 yr with DXA    2, depression  - stable. Cont with zoloft.     RTC in 1 yr  Orders Placed This Encounter   Procedures    DEXA BONE DENSITY     Standing Status:   Future     Standing Expiration Date:   6/28/2025     Order Specific Question:   Does the patient take a Calcium supplement?     Answer:   Yes     Order Specific Question:   Release to patient     Answer:   Immediate [1]    Basic metabolic panel     Standing Status:   Future     Number of Occurrences:   1     Standing Expiration Date:   6/28/2025     Order Specific Question:   Release to patient     Answer:   Immediate     Order Specific Question:   Release to patient     Answer:   Immediate [1]    TSH w reflex FT4     Standing Status:   Future     Number of Occurrences:   1     Standing Expiration Date:   6/28/2025     Order Specific Question:   Release to patient     Answer:   Immediate     Order Specific Question:   Release to patient     Answer:   Immediate [1]    Vitamin D 25 hydroxy     Standing Status:   Future     Number of Occurrences:   1     Standing Expiration Date:   6/28/2025     Order Specific Question:   Release to patient     Answer:    Immediate     Order Specific Question:   Release to patient     Answer:   Immediate [1]           Thank you very much for allowing me participating in the patient's care. Please feel free to contact me if any questions.     Electronically signed by Jacque Pryor MD

## 2024-06-28 NOTE — PATIENT INSTRUCTIONS
Reduce calcium to 1 tab daily   Add Vit D3  2000 units daily   Return in 1 yr with lab and DEXA scan.

## 2024-06-28 NOTE — LETTER
June 28, 2024     Monserrat Gonsalez MD  [address]  4th Floor  [facility] [place] [address]    Patient: Giovanna Noble  YOB: 1960  Date of Visit: 6/28/2024      Dear Dr. Gonsalez:    Thank you for referring Giovanna Noble to me for evaluation. Below are my notes for this consultation.    If you have questions, please do not hesitate to call me. I look forward to following your patient along with you.         Sincerely,        Jacque Pryor MD        CC: No Recipients    Jacque Pyror MD  6/28/2024  3:06 PM  Sign when Signing Visit           Endocrinology Report     Giovanna Noble is a 63 y.o. female who presents for follow up of     1 osteoporosis.   - Patient had a fall from standing position in 2018.  Had R hip and wrist frx. S/p ORIF.    - DXA 2018, 2021 lowest T score  > -2.5  - risk factors: old age, post menopause female, history of low vit D (corrected), relatively low BMI.   - no secondary causes identified  - treatment history:    Calcium supplement: none   Vitamin D supplement: took vit D 50,000 units for 3 months before Sept.     Last visit: 6/2023  Intervals:   - walking, weight lifting and yoga several times a week..   - on zoloft. Helping her depression.     - no medical changes.   - taking calcium and vi D     - exercising with yoga, lifting weights, eating balanced meal.    - Taking Fosamax 2/2022. Tolerating well    Medical History:  Past Medical History:   Diagnosis Date   • Abnormal Pap smear of cervix    • Acne    • Arthritis    • Breast cancer screening 09/07/2023    BI-RADS CATEGORY 1 - NEGATIVE  (NOR NC D)   HETEROGENEOUS   • Closed fracture of neck of right femur (CMS/HCC) 05/05/2018    Added automatically from request for surgery [id number]   • Closed fracture of right wrist 05/06/2018   • Depression      a year ago   • History of varicella    • Keratoconus, stable     Carlos Weeks   • Osteopenia 07/2018   • Pap smear for cervical cancer screening 06/25/2018    abnormal pap, hpv abnormal   •  Papanicolaou smear of anus with atypical squamous cells of undetermined significance (ASC-US) 09/10/2018    Added automatically from request for surgery [id number]   • Rosacea        Surgical History:   Past Surgical History:   Procedure Laterality Date   •  SECTION  1997   • DILATION AND CURETTAGE, DIAGNOSTIC / THERAPEUTIC     • FRACTURE SURGERY      right hip   • FRACTURE SURGERY      right wrist   • HIP SURGERY  2018   • HYSTERECTOMY      ovaries remain       Family History:  Family History   Problem Relation Age of Onset   • Stroke Biological Father    • Multiple sclerosis Biological Father    • Hypertension Biological Father    • Heart attack Biological Father         mid-50's   • Heart attack Biological Mother    • Hypertension Biological Mother    • Glaucoma Biological Mother    • Dementia Biological Mother    • No Known Problems Biological Brother    • Thyroid cancer Biological Sister    • Alcohol abuse Biological Son    • Breast cancer Neg Hx    • Colon cancer Neg Hx        Social history:  Social History     Tobacco Use   • Smoking status: Never   • Smokeless tobacco: Never   Vaping Use   • Vaping Use: Never used   Substance Use Topics   • Alcohol use: Yes     Comment: on occasion   • Drug use: Never       Medication(active prior to today):  Current Outpatient Medications   Medication Sig Dispense Refill   • alendronate (FOSAMAX) 70 mg tablet Take on an empty stomach and do not lie down for the next 30 min. One pill weekly 13 tablet 3   • calcium-vitamin D3 (Os-Joel 500 + D3) 500 mg-5 mcg (200 unit) per tablet Take 1 tablet by mouth daily. 90 tablet 3   • cholecalciferol, vitamin D3, 50 mcg (2,000 unit) capsule Take 1 capsule (2,000 Units total) by mouth daily. 90 capsule 3   • Lactobac no.41/Bifidobact no.7 (PROBIOTIC-10 ORAL) Take by mouth daily.     • sertraline (ZOLOFT) 50 mg tablet Take 1/2 tab (25 mg) daily x 2 weeks then increase to 1 whole tab daily (50 mg) 90 tablet 0     No current  "facility-administered medications for this visit.       Allergies:  Patient has no known allergies.    ROS:  All other systems reviewed and negative except as noted in HPI    Visit Vitals  /72 (BP Location: Left upper arm, Patient Position: Sitting)   Pulse 66   Resp 16   Ht 1.702 m (5' 7\")   Wt 58.2 kg (128 lb 3.2 oz)   LMP  (LMP Unknown)   SpO2 98%   BMI 20.08 kg/m²       Labs:      Latest Reference Range & Units 09/14/22 07:19   Sodium 134 - 144 mmol/L 143   Potassium 3.5 - 5.2 mmol/L 4.3   Chloride 96 - 106 mmol/L 104   CO2 20 - 29 mmol/L 25   BUN 8 - 27 mg/dL 15   Creatinine 0.57 - 1.00 mg/dL 0.79   Glucose 65 - 99 mg/dL 87   Calcium 8.7 - 10.3 mg/dL 9.6   AST (SGOT) 0 - 40 IU/L 23   ALT (SGPT) 0 - 32 IU/L 13   Alkaline Phosphatase 44 - 121 IU/L 56   Total Protein 6.0 - 8.5 g/dL 6.9   Albumin 3.8 - 4.9 g/dL 4.7   Bilirubin, Total 0.0 - 1.2 mg/dL 0.6   eGFR >59 mL/min/1.73 86   Bun/Creatinine Ratio 12 - 28  19   Globulin 1.5 - 4.5 g/dL 2.2   Cholesterol 100 - 199 mg/dL 203 (H)   Triglycerides 0 - 149 mg/dL 57   HDL >39 mg/dL 68   LDL Calculated 0 - 99 mg/dL 125 (H)   Hemoglobin A1C 4.8 - 5.6 % 5.5   Vit D, 25-Hydroxy 30.0 - 100.0 ng/mL 32.9   ALBUMIN/GLOBULIN RATIO 1.2 - 2.2  2.1   WBC 3.4 - 10.8 x10E3/uL 5.8   RBC 3.77 - 5.28 x10E6/uL 4.49   Hemoglobin 11.1 - 15.9 g/dL 13.8   Hematocrit 34.0 - 46.6 % 41.3   MCV 79 - 97 fL 92   MCH 26.6 - 33.0 pg 30.7   MCHC 31.5 - 35.7 g/dL 33.4   RDW 11.7 - 15.4 % 12.6   Platelets 150 - 450 x10E3/uL 213     Images:   7/2018    The DEXA was performed on [employer] equipment.     AP lumbar spine T-score:      L1-L4       -0.3  Corresponding bone mineral density:       1.152 g/cm2.     AP left hip lowest T-score:    Femoral neck      -1.8  Corresponding bone mineral density:       0.792 g/cm2.       7/2021  The DEXA was performed on [employer] equipment.     AP lumbar spine T-score       L1-L4                         -0.7  Corresponding bone " mineral density:       1.104 g/cm2.  There has been a 4.2% worsening since the previous study.     AP left hip lowest T-score:    Femoral neck      -1.9  Corresponding bone mineral density:       0.768 g/cm2.  There has been no significant change of the total hip since the previous study.     AP right hip was not performed secondary to a history of surgery.     7/2023  The DEXA was performed on [employer] equipment.     AP lumbar spine T-score       L1-L4                         0.0  Corresponding bone mineral density:       1.196 g/cm2.  There has been an 8.3% improvement since the previous study.  This value may be falsely elevated secondary to degenerative changes.     AP left hip lowest T-score:    Femoral neck      -1.7  Corresponding bone mineral density:       0.795 g/cm2.  There has been no significant change of the total hip since the previous study.     AP right hip was not performed secondary to history of surgery.    ASSESSMENT:/Plan:    1, Osteoporosis  - evidenced by fragility frx in 2018  - DXEA 2021 showing mild worsening of BMD    - risk factors: old age, post menopause female, history of low vit D (corrected), relatively low BMI.   - no secondary causes identified.     -  continue calcium and vit D supplement  - fall precaution  - encourage to continue weight bearing exercises.   - cont Fosamax   - DXA showed improvement  - return in 1 yr with DXA    2, depression  - stable. Cont with zoloft.     RTC in 1 yr  Orders Placed This Encounter   Procedures   • DEXA BONE DENSITY     Standing Status:   Future     Standing Expiration Date:   6/28/2025     Order Specific Question:   Does the patient take a Calcium supplement?     Answer:   Yes     Order Specific Question:   Release to patient     Answer:   Immediate [1]   • Basic metabolic panel     Standing Status:   Future     Number of Occurrences:   1     Standing Expiration Date:   6/28/2025     Order Specific Question:   Release to  patient     Answer:   Immediate     Order Specific Question:   Release to patient     Answer:   Immediate [1]   • TSH w reflex FT4     Standing Status:   Future     Number of Occurrences:   1     Standing Expiration Date:   6/28/2025     Order Specific Question:   Release to patient     Answer:   Immediate     Order Specific Question:   Release to patient     Answer:   Immediate [1]   • Vitamin D 25 hydroxy     Standing Status:   Future     Number of Occurrences:   1     Standing Expiration Date:   6/28/2025     Order Specific Question:   Release to patient     Answer:   Immediate     Order Specific Question:   Release to patient     Answer:   Immediate [1]           Thank you very much for allowing me participating in the patient's care. Please feel free to contact me if any questions.     Electronically signed by Jacque Pryor MD

## 2024-07-15 RX ORDER — SERTRALINE HYDROCHLORIDE 50 MG/1
50 TABLET, FILM COATED ORAL DAILY
Qty: 90 TABLET | Refills: 3 | Status: SHIPPED | OUTPATIENT
Start: 2024-07-15

## 2024-07-15 NOTE — TELEPHONE ENCOUNTER
Medicine Refill Request    Last Office Visit: 4/17/2024   Last Consult Visit: Visit date not found  Last Telemedicine Visit: 8/18/2023 Monserrat Jones,     Next Appointment: 11/6/2024      Current Outpatient Medications:     alendronate (FOSAMAX) 70 mg tablet, Take on an empty stomach and do not lie down for the next 30 min. One pill weekly, Disp: 13 tablet, Rfl: 3    calcium-vitamin D3 (Os-Joel 500 + D3) 500 mg-5 mcg (200 unit) per tablet, Take 1 tablet by mouth daily., Disp: 90 tablet, Rfl: 3    cholecalciferol, vitamin D3, 50 mcg (2,000 unit) capsule, Take 1 capsule (2,000 Units total) by mouth daily., Disp: 90 capsule, Rfl: 3    Lactobac no.41/Bifidobact no.7 (PROBIOTIC-10 ORAL), Take by mouth daily., Disp: , Rfl:     sertraline (ZOLOFT) 50 mg tablet, Take 1/2 tab (25 mg) daily x 2 weeks then increase to 1 whole tab daily (50 mg), Disp: 90 tablet, Rfl: 0      BP Readings from Last 3 Encounters:   06/28/24 116/72   06/17/24 120/70   04/17/24 120/78       Recent Lab results:  Lab Results   Component Value Date    CHOL 208 (H) 09/05/2023   ,   Lab Results   Component Value Date    HDL 50 09/05/2023   ,   Lab Results   Component Value Date    LDLCALC 140 (H) 09/05/2023   ,   Lab Results   Component Value Date    TRIG 101 09/05/2023        Lab Results   Component Value Date    GLUCOSE 89 04/29/2024   ,   Lab Results   Component Value Date    HGBA1C 5.7 (H) 09/05/2023         Lab Results   Component Value Date    CREATININE 0.67 04/29/2024       Lab Results   Component Value Date    TSH 1.410 04/29/2024           Lab Results   Component Value Date    HGBA1C 5.7 (H) 09/05/2023

## 2024-08-20 ENCOUNTER — TELEMEDICINE (OUTPATIENT)
Dept: INTERNAL MEDICINE | Facility: CLINIC | Age: 64
End: 2024-08-20
Payer: COMMERCIAL

## 2024-08-20 DIAGNOSIS — Z00.00 ENCOUNTER FOR ANNUAL PHYSICAL EXAM: ICD-10-CM

## 2024-08-20 DIAGNOSIS — R53.83 FATIGUE, UNSPECIFIED TYPE: Primary | ICD-10-CM

## 2024-08-20 DIAGNOSIS — E53.8 B12 DEFICIENCY: ICD-10-CM

## 2024-08-20 DIAGNOSIS — E55.9 VITAMIN D DEFICIENCY: ICD-10-CM

## 2024-08-20 DIAGNOSIS — F32.A DEPRESSION, UNSPECIFIED DEPRESSION TYPE: ICD-10-CM

## 2024-08-20 PROCEDURE — 99214 OFFICE O/P EST MOD 30 MIN: CPT | Mod: 95 | Performed by: INTERNAL MEDICINE

## 2024-08-20 RX ORDER — CYANOCOBALAMIN 1000 UG/ML
1000 INJECTION, SOLUTION INTRAMUSCULAR; SUBCUTANEOUS ONCE
Qty: 1 ML | Refills: 0 | Status: SHIPPED | OUTPATIENT
Start: 2024-08-20 | End: 2024-10-10 | Stop reason: ALTCHOICE

## 2024-08-20 NOTE — Clinical Note
Please call patient -- Needs MA appointment for B12 shot. Can you also help schedule her annual with me this fall ? Thank you

## 2024-08-20 NOTE — ASSESSMENT & PLAN NOTE
Patient reports increased fatigue.   Discussed treating vitamin B12 deficiency.     If no improvement, consider adjusting medication for depression.   Consider side effect of zoloft v depression causing fatigue.     Consider sleep study, though low suspicion for sleep d/o at this time.

## 2024-08-20 NOTE — PROGRESS NOTES
Verification of Patient Location:  The patient affirms they are currently located in the following state: [place]    Request for Consent:    Audio and Video Encounter   Jasmyn, my name is Monserrat Gonsalez MD.  Before we proceed, can you please verify your identification by telling me your full name and date of birth?  Can you tell me who is in the room with you?    You and I are about to have a telemedicine check-in or visit because you have requested it.  This is a live video-conference.  I am a real person, speaking to you in real time.  There is no one else with me on the video-conference. I am not recording this conversation and I am asking you not to record it.  This telemedicine visit will be billed to your health insurance or you, if you are self-insured.  You understand you will be responsible for any copayments or coinsurances that apply to your telemedicine visit.  Communication platform used for this encounter:  Amp'd Mobile Video Visit (Epic Video Client)       Before starting our telemedicine visit, I am required to get your consent for this virtual check-in or visit by telemedicine. Do you consent?    Patient Response to Request for Consent:  Yes      Visit Documentation:  Subjective     Patient ID: Giovanna Noble is a 64 y.o. female.  1960      HPI    Exhausted - she can sleep 10+ hours, doesn't wake up feeling refreshed, hasn't been told that she snores     She is taking calcium with vitamin D more consistently.     Mood has overall improved with addition of zoloft.   She is currently taking 50 mg in the morning.   Still sometimes feels down.     The following have been reviewed and updated as appropriate in this visit:   Allergies  Meds  Problems         Review of Systems  Per HPI    Assessment/Plan   Diagnoses and all orders for this visit:    Fatigue, unspecified type (Primary)  Assessment & Plan:  Patient reports increased fatigue.   Discussed treating vitamin B12 deficiency.     If no  improvement, consider adjusting medication for depression.   Consider side effect of zoloft v depression causing fatigue.     Consider sleep study, though low suspicion for sleep d/o at this time.     Orders:  -     CBC and Differential; Future  -     Vitamin B12; Future  -     TSH w reflex FT4; Future  -     Ferritin; Future  -     Iron and TIBC; Future  -     Vitamin D 25 hydroxy; Future    B12 deficiency  Assessment & Plan:  Low B12 may be contributing to fatigue.     Will administer 1000 mcg IM x 1 dose, then start daily supplement.   Recheck labs in 2-3 months.     Orders:  -     cyanocobalamin (VITAMIN B-12) 1,000 mcg/mL injection; Inject 1 mL (1,000 mcg total) into the shoulder, thigh, or buttocks once for 1 dose.  -     CBC and Differential; Future  -     Vitamin B12; Future    Encounter for annual physical exam  -     CBC and Differential; Future  -     Vitamin B12; Future  -     Hemoglobin A1c; Future  -     Lipid panel; Future  -     TSH w reflex FT4; Future  -     Ferritin; Future  -     Iron and TIBC; Future  -     Vitamin D 25 hydroxy; Future    Vitamin D deficiency  Comments:  vitamin D borderline,   rec starting vit D3 1000 IU daily  Orders:  -     Vitamin D 25 hydroxy; Future    Depression, unspecified depression type  Assessment & Plan:  Zoloft has overall been helpful.   Still has low mood at times.    Consider addition of wellbutrin.          Time Spent:  I spent 27 minutes on this date of service performing the following activities: obtaining history, entering orders, documenting, obtaining / reviewing records, providing counseling and education, independently reviewing study/studies, and coordinating care.

## 2024-08-20 NOTE — ASSESSMENT & PLAN NOTE
Low B12 may be contributing to fatigue.     Will administer 1000 mcg IM x 1 dose, then start daily supplement.   Recheck labs in 2-3 months.

## 2024-08-20 NOTE — ASSESSMENT & PLAN NOTE
Zoloft has overall been helpful.   Still has low mood at times.    Consider addition of wellbutrin.

## 2024-08-21 ENCOUNTER — CLINICAL SUPPORT (OUTPATIENT)
Dept: INTERNAL MEDICINE | Facility: CLINIC | Age: 64
End: 2024-08-21
Payer: COMMERCIAL

## 2024-08-21 DIAGNOSIS — E53.8 B12 DEFICIENCY: Primary | ICD-10-CM

## 2024-08-21 PROCEDURE — 96372 THER/PROPH/DIAG INJ SC/IM: CPT | Performed by: INTERNAL MEDICINE

## 2024-08-21 RX ORDER — CYANOCOBALAMIN 1000 UG/ML
1000 INJECTION, SOLUTION INTRAMUSCULAR; SUBCUTANEOUS ONCE
Status: COMPLETED | OUTPATIENT
Start: 2024-08-21 | End: 2024-08-21

## 2024-08-21 RX ADMIN — CYANOCOBALAMIN 1000 MCG: 1000 INJECTION, SOLUTION INTRAMUSCULAR; SUBCUTANEOUS at 15:36

## 2024-10-03 SDOH — ECONOMIC STABILITY: FOOD INSECURITY: WITHIN THE PAST 12 MONTHS, THE FOOD YOU BOUGHT JUST DIDN'T LAST AND YOU DIDN'T HAVE MONEY TO GET MORE.: NEVER TRUE

## 2024-10-03 SDOH — ECONOMIC STABILITY: FOOD INSECURITY: WITHIN THE PAST 12 MONTHS, YOU WORRIED THAT YOUR FOOD WOULD RUN OUT BEFORE YOU GOT MONEY TO BUY MORE.: NEVER TRUE

## 2024-10-03 SDOH — ECONOMIC STABILITY: INCOME INSECURITY: IN THE LAST 12 MONTHS, WAS THERE A TIME WHEN YOU WERE NOT ABLE TO PAY THE MORTGAGE OR RENT ON TIME?: NO

## 2024-10-03 SDOH — ECONOMIC STABILITY: TRANSPORTATION INSECURITY
IN THE PAST 12 MONTHS, HAS THE LACK OF TRANSPORTATION KEPT YOU FROM MEDICAL APPOINTMENTS OR FROM GETTING MEDICATIONS?: NO

## 2024-10-03 SDOH — ECONOMIC STABILITY: TRANSPORTATION INSECURITY
IN THE PAST 12 MONTHS, HAS LACK OF TRANSPORTATION KEPT YOU FROM MEETINGS, WORK, OR FROM GETTING THINGS NEEDED FOR DAILY LIVING?: NO

## 2024-10-03 ASSESSMENT — SOCIAL DETERMINANTS OF HEALTH (SDOH): IN THE PAST 12 MONTHS, HAS THE ELECTRIC, GAS, OIL, OR WATER COMPANY THREATENED TO SHUT OFF SERVICE IN YOUR HOME?: NO

## 2024-10-10 ENCOUNTER — OFFICE VISIT (OUTPATIENT)
Dept: INTERNAL MEDICINE | Facility: CLINIC | Age: 64
End: 2024-10-10
Payer: COMMERCIAL

## 2024-10-10 VITALS
DIASTOLIC BLOOD PRESSURE: 70 MMHG | HEART RATE: 76 BPM | TEMPERATURE: 97.6 F | WEIGHT: 132 LBS | OXYGEN SATURATION: 98 % | HEIGHT: 67 IN | BODY MASS INDEX: 20.72 KG/M2 | SYSTOLIC BLOOD PRESSURE: 112 MMHG | RESPIRATION RATE: 16 BRPM

## 2024-10-10 DIAGNOSIS — Z12.11 SCREENING FOR COLON CANCER: ICD-10-CM

## 2024-10-10 DIAGNOSIS — E78.5 HYPERLIPIDEMIA, UNSPECIFIED HYPERLIPIDEMIA TYPE: ICD-10-CM

## 2024-10-10 DIAGNOSIS — Z23 NEEDS FLU SHOT: ICD-10-CM

## 2024-10-10 DIAGNOSIS — Z12.31 ENCOUNTER FOR SCREENING MAMMOGRAM FOR MALIGNANT NEOPLASM OF BREAST: ICD-10-CM

## 2024-10-10 DIAGNOSIS — Z00.00 ENCOUNTER FOR ANNUAL PHYSICAL EXAM: Primary | ICD-10-CM

## 2024-10-10 DIAGNOSIS — E04.9 THYROID ENLARGED: ICD-10-CM

## 2024-10-10 DIAGNOSIS — H04.123 DRY EYES, BILATERAL: ICD-10-CM

## 2024-10-10 DIAGNOSIS — Z82.49 FAMILY HISTORY OF ISCHEMIC HEART DISEASE: ICD-10-CM

## 2024-10-10 PROCEDURE — 90471 IMMUNIZATION ADMIN: CPT | Performed by: INTERNAL MEDICINE

## 2024-10-10 PROCEDURE — 3008F BODY MASS INDEX DOCD: CPT | Performed by: INTERNAL MEDICINE

## 2024-10-10 PROCEDURE — 99396 PREV VISIT EST AGE 40-64: CPT | Mod: 25 | Performed by: INTERNAL MEDICINE

## 2024-10-10 PROCEDURE — 90656 IIV3 VACC NO PRSV 0.5 ML IM: CPT | Performed by: INTERNAL MEDICINE

## 2024-10-10 NOTE — PROGRESS NOTES
Giovanna Noble is a 64 y.o. adult who presents today for annual wellness exam.    Chief Complaint   Patient presents with    Annual Exam         SUBJECTIVE:  HPI      Noticed some improvement in fatigue after B12 injection in August.     Lifts weights 2x /week.   Walks a lot.       No Known Allergies      Current Outpatient Medications:     alendronate (FOSAMAX) 70 mg tablet, Take on an empty stomach and do not lie down for the next 30 min. One pill weekly, Disp: 13 tablet, Rfl: 3    sertraline (ZOLOFT) 50 mg tablet, Take 1 tablet (50 mg total) by mouth daily., Disp: 90 tablet, Rfl: 3    calcium-vitamin D3 (Os-Joel 500 + D3) 500 mg-5 mcg (200 unit) per tablet, Take 1 tablet by mouth daily., Disp: 90 tablet, Rfl: 3    cholecalciferol, vitamin D3, 50 mcg (2,000 unit) capsule, Take 1 capsule (2,000 Units total) by mouth daily., Disp: 90 capsule, Rfl: 3    Lactobac no.41/Bifidobact no.7 (PROBIOTIC-10 ORAL), Take by mouth daily., Disp: , Rfl:     Past Medical History:   Diagnosis Date    Abnormal Pap smear of cervix     Acne     Arthritis     Breast cancer screening 2023    BI-RADS CATEGORY 1 - NEGATIVE  (NOR NC D)   HETEROGENEOUS    Closed fracture of neck of right femur (CMS/HCC) 2018    Added automatically from request for surgery [id number]    Closed fracture of right wrist 2018    Depression      a year ago    History of varicella     Keratoconus, stable     Carlos Weeks    Osteopenia 2018    Pap smear for cervical cancer screening 2018    abnormal pap, hpv abnormal    Papanicolaou smear of anus with atypical squamous cells of undetermined significance (ASC-US) 09/10/2018    Added automatically from request for surgery [id number]    Rosacea        Past Surgical History   Procedure Laterality Date    CERVICAL LEEP/LETZ (LOOP ELECTROSURGICAL EXCISION PROCEDURE) N/A 10/5/2018    Performed by Aimee Ballard MD at [facility]     section  1997    Dilation and curettage,  diagnostic / therapeutic      Fracture surgery      right hip    Fracture surgery      right wrist    Hip surgery  05/06/2018    Hysterectomy      ovaries remain    HYSTERECTOMY ABDOMINAL TOTAL LAPAROSCOPIC  2/1/2019    Performed by Aimee Ballard MD at [facility]    OPEN REDUCTION INTERNAL FIXATION HIP/FEMUR PINNING/CANNULATED SCREWS Right 5/6/2018    Performed by Antonio Mckeon Jr., MD at  OR       Family History   Problem Relation Name Age of Onset    Stroke Biological Father      Multiple sclerosis Biological Father      Hypertension Biological Father      Heart attack Biological Father          mid-50's    Heart attack Biological Mother      Hypertension Biological Mother      Glaucoma Biological Mother      Dementia Biological Mother      No Known Problems Biological Brother      Thyroid cancer Biological Sister      Alcohol abuse Biological Son      Breast cancer Neg Hx      Colon cancer Neg Hx         Social History     Socioeconomic History    Marital status:      Spouse name: Not on file    Number of children: Not on file    Years of education: Not on file    Highest education level: Not on file   Occupational History    Occupation: works at [employer]   Tobacco Use    Smoking status: Never    Smokeless tobacco: Never   Vaping Use    Vaping status: Never Used   Substance and Sexual Activity    Alcohol use: Yes     Comment: on occasion    Drug use: Never    Sexual activity: Yes     Partners: Male     Birth control/protection: Post-menopausal   Other Topics Concern    Not on file   Social History Narrative    Lives with .      Exercise: swimming and yoga, high NEAT (work is active)    Sleep: 7 hours    Nutrition: Getting purple carrot (vegan mail) 3 times a week, plus salads     Social Drivers of Health     Financial Resource Strain: Not on file   Food Insecurity: No Food Insecurity (10/3/2024)    Hunger Vital Sign     Worried About Running Out of Food in the Last Year: Never  "true     Ran Out of Food in the Last Year: Never true   Transportation Needs: No Transportation Needs (10/3/2024)    PRAPARE - Transportation     Lack of Transportation (Medical): No     Lack of Transportation (Non-Medical): No   Physical Activity: Not on file   Stress: Not on file   Social Connections: Not on file   Intimate Partner Violence: Not on file   Housing Stability: Low Risk  (10/3/2024)    Housing Stability Vital Sign     Unable to Pay for Housing in the Last Year: No     Number of Times Moved in the Last Year: 0     Homeless in the Last Year: No       ROS   Constitutional: no unintentional weight changes  HENT: no nasal congestion, post-nasal drip   Eyes: no recent changes in vision   Cardiac: no chest pain or palpitations  Respiratory: no shortness of breath   GI: no abdominal pain, diarrhea or constipation. No blood in stool  Msk: no significant joint pain  Neuro: no headaches, dizziness or lightheadedness  Skin: no rashes or new lesions     OBJECTIVE:  Vitals:    10/10/24 1447   BP: 112/70   BP Location: Right upper arm   Patient Position: Sitting   Pulse: 76   Resp: 16   Temp: 36.4 °C (97.6 °F)   TempSrc: Temporal   SpO2: 98%   Weight: 59.9 kg (132 lb)   Height: 1.702 m (5' 7\")       Wt Readings from Last 3 Encounters:   10/10/24 59.9 kg (132 lb)   06/28/24 58.2 kg (128 lb 3.2 oz)   06/17/24 57 kg (125 lb 9.6 oz)     Body mass index is 20.67 kg/m².      Physical Exam  Constitutional:       Appearance: Normal appearance. She is well-developed.   HENT:      Head: Normocephalic.      Right Ear: Tympanic membrane and ear canal normal.      Left Ear: Tympanic membrane and ear canal normal.      Nose: Nose normal.      Mouth/Throat:      Mouth: Mucous membranes are moist.      Pharynx: Oropharynx is clear.   Eyes:      Extraocular Movements: Extraocular movements intact.      Conjunctiva/sclera: Conjunctivae normal.      Pupils: Pupils are equal, round, and reactive to light.   Neck:      Comments: Thyroid " enlarged  Cardiovascular:      Rate and Rhythm: Normal rate and regular rhythm.      Heart sounds: Normal heart sounds.   Pulmonary:      Effort: Pulmonary effort is normal.      Breath sounds: Normal breath sounds.   Abdominal:      General: Abdomen is flat. Bowel sounds are normal. There is no distension.      Palpations: Abdomen is soft.      Tenderness: There is no abdominal tenderness.   Musculoskeletal:         General: No swelling.   Lymphadenopathy:      Cervical: No cervical adenopathy.   Skin:     General: Skin is warm and dry.      Capillary Refill: Capillary refill takes less than 2 seconds.   Neurological:      General: No focal deficit present.      Mental Status: She is alert. Mental status is at baseline.             Assessment & Plan  Encounter for annual physical exam  Reviewed health maintenance and preventative screenings.   Discussed importance of healthy lifestyle, including diet and exercise.           Needs flu shot    Orders:    Influenza vaccine trivalent 6 mons and older IM (Flulaval)    Encounter for screening mammogram for malignant neoplasm of breast    Orders:    BI SCREENING MAMMOGRAM BILATERAL(TOMOSYNTHESIS); Future    Screening for colon cancer    Orders:    Direct Access Colonoscopy BMMSA    Dry eyes, bilateral    Orders:    Sjogrens syndrome-A&B extractable nuclear antibody; Future    Hyperlipidemia, unspecified hyperlipidemia type    Orders:    CT HEART CORONARY ARTERY CALCIUM SCORE WITHOUT IV CONTRAST; Future    Family history of ischemic heart disease    Orders:    CT HEART CORONARY ARTERY CALCIUM SCORE WITHOUT IV CONTRAST; Future    Thyroid enlarged    Orders:    ULTRASOUND THYROID; Future

## 2024-10-25 ENCOUNTER — HOSPITAL ENCOUNTER (OUTPATIENT)
Dept: RADIOLOGY | Age: 64
Discharge: HOME | End: 2024-10-25
Attending: INTERNAL MEDICINE
Payer: COMMERCIAL

## 2024-10-25 DIAGNOSIS — E04.9 THYROID ENLARGED: ICD-10-CM

## 2024-10-25 DIAGNOSIS — Z12.31 ENCOUNTER FOR SCREENING MAMMOGRAM FOR MALIGNANT NEOPLASM OF BREAST: Primary | ICD-10-CM

## 2024-10-25 DIAGNOSIS — Z12.31 ENCOUNTER FOR SCREENING MAMMOGRAM FOR MALIGNANT NEOPLASM OF BREAST: ICD-10-CM

## 2024-10-25 PROCEDURE — 77063 BREAST TOMOSYNTHESIS BI: CPT

## 2024-10-25 PROCEDURE — 76536 US EXAM OF HEAD AND NECK: CPT

## 2024-10-30 NOTE — ASSESSMENT & PLAN NOTE
Reviewed health maintenance and preventative screenings.   Discussed importance of healthy lifestyle, including diet and exercise.

## 2024-11-01 LAB
ENA SS-A AB SER-ACNC: <0.2 AI (ref 0–0.9)
ENA SS-B AB SER-ACNC: <0.2 AI (ref 0–0.9)

## 2025-01-10 ENCOUNTER — HOSPITAL ENCOUNTER (OUTPATIENT)
Dept: RADIOLOGY | Age: 65
Discharge: HOME | End: 2025-01-10
Attending: INTERNAL MEDICINE

## 2025-01-10 DIAGNOSIS — Z82.49 FAMILY HISTORY OF ISCHEMIC HEART DISEASE: ICD-10-CM

## 2025-01-10 DIAGNOSIS — E78.5 HYPERLIPIDEMIA, UNSPECIFIED HYPERLIPIDEMIA TYPE: ICD-10-CM

## 2025-01-10 PROCEDURE — 75571 CT HRT W/O DYE W/CA TEST: CPT

## 2025-06-03 ENCOUNTER — HOSPITAL ENCOUNTER (OUTPATIENT)
Dept: RADIOLOGY | Age: 65
Discharge: HOME | End: 2025-06-03
Attending: INTERNAL MEDICINE
Payer: COMMERCIAL

## 2025-06-03 DIAGNOSIS — M81.0 OSTEOPOROSIS WITHOUT CURRENT PATHOLOGICAL FRACTURE, UNSPECIFIED OSTEOPOROSIS TYPE: ICD-10-CM

## 2025-06-03 PROCEDURE — 77080 DXA BONE DENSITY AXIAL: CPT

## 2025-06-04 ENCOUNTER — RESULTS FOLLOW-UP (OUTPATIENT)
Dept: ENDOCRINOLOGY | Facility: CLINIC | Age: 65
End: 2025-06-04

## 2025-06-11 RX ORDER — SERTRALINE HYDROCHLORIDE 50 MG/1
50 TABLET, FILM COATED ORAL DAILY
Qty: 90 TABLET | Refills: 3 | Status: SHIPPED | OUTPATIENT
Start: 2025-06-11

## 2025-06-11 NOTE — TELEPHONE ENCOUNTER
Medicine Refill Request    Last Office: 10/10/2024   Last Consult Visit: Visit date not found  Last Telemedicine Visit: 8/20/2024 Monserrat Gonsalez MD    Next Appointment: Visit date not found      Current Outpatient Medications:     alendronate (FOSAMAX) 70 mg tablet, Take on an empty stomach and do not lie down for the next 30 min. One pill weekly, Disp: 13 tablet, Rfl: 3    calcium-vitamin D3 (Os-Joel 500 + D3) 500 mg-5 mcg (200 unit) per tablet, Take 1 tablet by mouth daily., Disp: 90 tablet, Rfl: 3    cholecalciferol, vitamin D3, 50 mcg (2,000 unit) capsule, Take 1 capsule (2,000 Units total) by mouth daily., Disp: 90 capsule, Rfl: 3    Lactobac no.41/Bifidobact no.7 (PROBIOTIC-10 ORAL), Take by mouth daily., Disp: , Rfl:     sertraline (ZOLOFT) 50 mg tablet, Take 1 tablet (50 mg total) by mouth daily., Disp: 90 tablet, Rfl: 3      BP Readings from Last 3 Encounters:   10/10/24 112/70   06/28/24 116/72   06/17/24 120/70       Recent Lab results:  Lab Results   Component Value Date    CHOL 208 (H) 09/05/2023   ,   Lab Results   Component Value Date    HDL 50 09/05/2023   ,   Lab Results   Component Value Date    LDLCALC 140 (H) 09/05/2023   ,   Lab Results   Component Value Date    TRIG 101 09/05/2023        Lab Results   Component Value Date    GLUCOSE 89 04/29/2024   ,   Lab Results   Component Value Date    HGBA1C 5.7 (H) 09/05/2023         Lab Results   Component Value Date    CREATININE 0.67 04/29/2024       Lab Results   Component Value Date    TSH 1.410 04/29/2024          Medicine Refill Request    Last Office: 10/10/2024   Last Consult Visit: Visit date not found  Last Telemedicine Visit: 8/20/2024 Monserrat Gonsalez MD    Next Appointment: Visit date not found      Current Outpatient Medications:     alendronate (FOSAMAX) 70 mg tablet, Take on an empty stomach and do not lie down for the next 30 min. One pill weekly, Disp: 13 tablet, Rfl: 3    calcium-vitamin D3 (Os-Joel 500 + D3) 500 mg-5 mcg (200  unit) per tablet, Take 1 tablet by mouth daily., Disp: 90 tablet, Rfl: 3    cholecalciferol, vitamin D3, 50 mcg (2,000 unit) capsule, Take 1 capsule (2,000 Units total) by mouth daily., Disp: 90 capsule, Rfl: 3    Lactobac no.41/Bifidobact no.7 (PROBIOTIC-10 ORAL), Take by mouth daily., Disp: , Rfl:     sertraline (ZOLOFT) 50 mg tablet, Take 1 tablet (50 mg total) by mouth daily., Disp: 90 tablet, Rfl: 3      BP Readings from Last 3 Encounters:   10/10/24 112/70   06/28/24 116/72   06/17/24 120/70       Recent Lab results:  Lab Results   Component Value Date    CHOL 208 (H) 09/05/2023   ,   Lab Results   Component Value Date    HDL 50 09/05/2023   ,   Lab Results   Component Value Date    LDLCALC 140 (H) 09/05/2023   ,   Lab Results   Component Value Date    TRIG 101 09/05/2023        Lab Results   Component Value Date    GLUCOSE 89 04/29/2024   ,   Lab Results   Component Value Date    HGBA1C 5.7 (H) 09/05/2023         Lab Results   Component Value Date    CREATININE 0.67 04/29/2024       Lab Results   Component Value Date    TSH 1.410 04/29/2024

## 2025-07-30 DIAGNOSIS — M81.0 OSTEOPOROSIS WITHOUT CURRENT PATHOLOGICAL FRACTURE, UNSPECIFIED OSTEOPOROSIS TYPE: ICD-10-CM

## 2025-08-06 RX ORDER — ALENDRONATE SODIUM 70 MG/1
TABLET ORAL
Qty: 12 TABLET | Refills: 0 | Status: SHIPPED | OUTPATIENT
Start: 2025-08-06

## (undated) DEVICE — BLADE SCALPEL #10

## (undated) DEVICE — GOWN SURGICAL REINFORCED X-LAR

## (undated) DEVICE — PAD GROUND ELECTROSURGICAL W/CORD

## (undated) DEVICE — SEALANT SURGICAL FLOSEAL 5ML STERILE PREP RECOTHERM

## (undated) DEVICE — PENCIL HAND ELECTROSURGICAL

## (undated) DEVICE — TRAY URINE METER FOLEY SILVER 16 FR 5CC 2 W

## (undated) DEVICE — TIPS SCISSOR MICROLINE LAP

## (undated) DEVICE — SPECULUM VAG NAVITROL BRIESKY

## (undated) DEVICE — TUBING SMOKE EVAC LG 7/8IN X 10IN

## (undated) DEVICE — GLOVE SZ 6.5 PROTEXIS CLASSIC LATEX

## (undated) DEVICE — TUBE SUCTION 1/4INX20FT STERILE

## (undated) DEVICE — Device

## (undated) DEVICE — PAD POSITIONING XL W/ARM PROTECTORS

## (undated) DEVICE — GLOVE SURG PROTEXIS PF 7.5

## (undated) DEVICE — DRESSING ADAPTIC 3INX8IN

## (undated) DEVICE — SPECULUM VAGINAL SIMMS 7IN

## (undated) DEVICE — SANI-SERVE SLUSH DRAPE SUBSTIT

## (undated) DEVICE — SUTURE VICRYL 3-0 J416H SH UNDYED

## (undated) DEVICE — PACK GYN LAPAROSCOPY TLH

## (undated) DEVICE — ***USE 138819***HANDPIECE HARMONIC HARH36 LAPAROSCOPIC 36CM

## (undated) DEVICE — DRESSING SPONGE GAUZE 4X4 STER

## (undated) DEVICE — STERISTRIP 1/2INX4IN

## (undated) DEVICE — SUTURE VICRYL 2-0 J417H SH UNDYED

## (undated) DEVICE — GLOVE SZ 7.5 LINER PROTEXIS PI BL

## (undated) DEVICE — PAD POSITIONING XL

## (undated) DEVICE — ***USE 138537*** SUTURE VICRYL 0 J340H CT-1 27IN VIOLET

## (undated) DEVICE — PACKING VAGINAL 2X72 4PLY

## (undated) DEVICE — TROCAR 1ST ENTRY 5 X 100MM ADV FIX

## (undated) DEVICE — MASTISOL LIQUID ADHESIVE

## (undated) DEVICE — TIP SUCTION YANKAUER

## (undated) DEVICE — SOLN IRRIG .9%SOD 1000ML

## (undated) DEVICE — SUTURE VICRYL 3-0  J497G

## (undated) DEVICE — DRESSING ABD STER LGE 8X10

## (undated) DEVICE — DRESSING MEPILEX AG 3X3

## (undated) DEVICE — DRAPE POUCH IRRIGATION

## (undated) DEVICE — CLOTH PREPPING SAGE 2% CHG 2/PK

## (undated) DEVICE — SUTURE MONOCRYL 4-0 Y426H PS-2 27IN

## (undated) DEVICE — BLANKET UPPER BODY

## (undated) DEVICE — GLOVE PROTEXIS PI ORTHO 7.5

## (undated) DEVICE — MANIFOLD SINGLE PORT NEPTUNE

## (undated) DEVICE — ELEVATOR UTERINE V-CARE STD CERVICAL CUP

## (undated) DEVICE — ***USE 140147***DRESSING MEPILEX 3X3 BORDER LITE

## (undated) DEVICE — POUCH STERI DRAPE INSTRUMENT LONG

## (undated) DEVICE — TROCAR 1ST ENTRY 12 X 100MM ADV FIX

## (undated) DEVICE — TROCAR SLEEVE W/ BALLOON 5MM

## (undated) DEVICE — TUBING STRYKEFLOW SUCT/IRRIG 10IN

## (undated) DEVICE — STRYKEFLOW 2 W/ DISP TIP

## (undated) DEVICE — PENEVAC1 NONSTICK SMOKE EVAC

## (undated) DEVICE — MANIFOLD FOUR PORT NEPTUNE

## (undated) DEVICE — APPLICATOR CHLORAPREP 26ML ORANGE TINT

## (undated) DEVICE — PAD PERI MATERNITY LENGTH

## (undated) DEVICE — TUBING INSUFFLATION PNEUMOSURE HEATED HIGH FLOW

## (undated) DEVICE — LOOP ELECTRODE 15MM X 12MM GREEN

## (undated) DEVICE — COVER LIGHTHANDLE (STERILE SINGLE PA

## (undated) DEVICE — KIT NEEDLE PAD LRG LIGHT GLOVE

## (undated) DEVICE — ADHESIVE, SKIN DERMABOND ADV MINI .36 ML

## (undated) DEVICE — DRAPE LARGE REVERSE FOLD

## (undated) DEVICE — ***USE 57698*** SLEEVE FLOWTRON DVT CALF SINGLE USE

## (undated) DEVICE — PIN GUIDE THREADED ACE

## (undated) DEVICE — ***USE 132714***BANDAGE ELASTIC 6IN MEDICHOICE

## (undated) DEVICE — ADHESIVE SKIN DERMABOND ADVANCED 0.7ML